# Patient Record
Sex: MALE | Race: WHITE | Employment: OTHER | URBAN - METROPOLITAN AREA
[De-identification: names, ages, dates, MRNs, and addresses within clinical notes are randomized per-mention and may not be internally consistent; named-entity substitution may affect disease eponyms.]

---

## 2016-12-27 LAB
ALBUMIN SERPL-MCNC: 4.9 G/DL
ALP BLD-CCNC: 105 U/L
ALT SERPL-CCNC: 27 U/L
AST SERPL-CCNC: 23 U/L
BASOPHILS ABSOLUTE: NORMAL /ΜL
BASOPHILS RELATIVE PERCENT: NORMAL %
BILIRUB SERPL-MCNC: 0.5 MG/DL (ref 0.1–1.4)
BUN BLDV-MCNC: 12 MG/DL
CALCIUM SERPL-MCNC: 9.4 MG/DL
CHLORIDE BLD-SCNC: 101 MMOL/L
CHOLESTEROL, TOTAL: 97 MG/DL
CHOLESTEROL/HDL RATIO: ABNORMAL
CO2: 24 MMOL/L
CREAT SERPL-MCNC: 0.54 MG/DL
EOSINOPHILS ABSOLUTE: NORMAL /ΜL
EOSINOPHILS RELATIVE PERCENT: NORMAL %
GFR CALCULATED: >60
GLUCOSE BLD-MCNC: 97 MG/DL
HCT VFR BLD CALC: 42 % (ref 41–53)
HDLC SERPL-MCNC: 74 MG/DL (ref 35–70)
HEMOGLOBIN: 14.7 G/DL (ref 13.5–17.5)
LDL CHOLESTEROL CALCULATED: 16 MG/DL (ref 0–160)
LYMPHOCYTES ABSOLUTE: NORMAL /ΜL
LYMPHOCYTES RELATIVE PERCENT: NORMAL %
MCH RBC QN AUTO: 31.1 PG
MCHC RBC AUTO-ENTMCNC: 34.9 G/DL
MCV RBC AUTO: 89.3 FL
MONOCYTES ABSOLUTE: NORMAL /ΜL
MONOCYTES RELATIVE PERCENT: NORMAL %
NEUTROPHILS ABSOLUTE: NORMAL /ΜL
NEUTROPHILS RELATIVE PERCENT: NORMAL %
PLATELET # BLD: 174 K/ΜL
PMV BLD AUTO: NORMAL FL
POTASSIUM SERPL-SCNC: 4 MMOL/L
RBC # BLD: 4.7 10^6/ΜL
SODIUM BLD-SCNC: 138 MMOL/L
TOTAL PROTEIN: 6.7
TRIGL SERPL-MCNC: 35 MG/DL
TSH SERPL DL<=0.05 MIU/L-ACNC: 5.62 UIU/ML
VITAMIN D 25-HYDROXY: 46.6
VITAMIN D2, 25 HYDROXY: NORMAL
VITAMIN D3,25 HYDROXY: NORMAL
VLDLC SERPL CALC-MCNC: ABNORMAL MG/DL
WBC # BLD: 5.6 10^3/ML

## 2017-06-26 ENCOUNTER — OFFICE VISIT (OUTPATIENT)
Dept: FAMILY MEDICINE CLINIC | Age: 22
End: 2017-06-26

## 2017-06-26 VITALS
SYSTOLIC BLOOD PRESSURE: 102 MMHG | HEIGHT: 68 IN | DIASTOLIC BLOOD PRESSURE: 60 MMHG | HEART RATE: 67 BPM | TEMPERATURE: 97.6 F | BODY MASS INDEX: 22.43 KG/M2 | WEIGHT: 148 LBS | OXYGEN SATURATION: 98 % | RESPIRATION RATE: 12 BRPM

## 2017-06-26 DIAGNOSIS — Z11.59 NEED FOR HEPATITIS B SCREENING TEST: ICD-10-CM

## 2017-06-26 DIAGNOSIS — Z11.4 SCREENING FOR HIV WITHOUT PRESENCE OF RISK FACTORS: ICD-10-CM

## 2017-06-26 DIAGNOSIS — Q04.0 AGENESIS OF CORPUS CALLOSUM (HCC): ICD-10-CM

## 2017-06-26 DIAGNOSIS — E55.9 VITAMIN D DEFICIENCY: Primary | ICD-10-CM

## 2017-06-26 DIAGNOSIS — F41.9 ANXIETY: ICD-10-CM

## 2017-06-26 DIAGNOSIS — G40.909 NONINTRACTABLE EPILEPSY WITHOUT STATUS EPILEPTICUS, UNSPECIFIED EPILEPSY TYPE (HCC): ICD-10-CM

## 2017-06-26 LAB
HBV SURFACE AB TITR SER: NORMAL MIU/ML
HEPATITIS B SURFACE ANTIGEN INTERPRETATION: NORMAL

## 2017-06-26 PROCEDURE — 99214 OFFICE O/P EST MOD 30 MIN: CPT | Performed by: FAMILY MEDICINE

## 2017-06-26 ASSESSMENT — ENCOUNTER SYMPTOMS
ALLERGIC/IMMUNOLOGIC NEGATIVE: 1
RESPIRATORY NEGATIVE: 1
GASTROINTESTINAL NEGATIVE: 1

## 2017-06-27 ENCOUNTER — TELEPHONE (OUTPATIENT)
Dept: FAMILY MEDICINE CLINIC | Age: 22
End: 2017-06-27

## 2017-06-27 LAB — HIV-1 AND HIV-2 ANTIBODIES: NEGATIVE

## 2017-06-30 ENCOUNTER — PATIENT MESSAGE (OUTPATIENT)
Dept: FAMILY MEDICINE CLINIC | Age: 22
End: 2017-06-30

## 2017-08-11 ENCOUNTER — OFFICE VISIT (OUTPATIENT)
Dept: INTERNAL MEDICINE | Age: 22
End: 2017-08-11

## 2017-08-11 VITALS
OXYGEN SATURATION: 98 % | WEIGHT: 143 LBS | DIASTOLIC BLOOD PRESSURE: 60 MMHG | SYSTOLIC BLOOD PRESSURE: 94 MMHG | HEIGHT: 68 IN | HEART RATE: 78 BPM | BODY MASS INDEX: 21.67 KG/M2 | TEMPERATURE: 97.3 F

## 2017-08-11 DIAGNOSIS — H10.33 ACUTE CONJUNCTIVITIS OF BOTH EYES, UNSPECIFIED ACUTE CONJUNCTIVITIS TYPE: Primary | ICD-10-CM

## 2017-08-11 PROCEDURE — 99213 OFFICE O/P EST LOW 20 MIN: CPT | Performed by: INTERNAL MEDICINE

## 2017-08-11 RX ORDER — TOBRAMYCIN 3 MG/ML
SOLUTION/ DROPS OPHTHALMIC
Qty: 5 ML | Refills: 1 | Status: SHIPPED | OUTPATIENT
Start: 2017-08-11 | End: 2018-01-02 | Stop reason: ALTCHOICE

## 2017-08-11 ASSESSMENT — ENCOUNTER SYMPTOMS
VOICE CHANGE: 0
CHOKING: 0
SHORTNESS OF BREATH: 0
WHEEZING: 0
PHOTOPHOBIA: 0
COUGH: 1
EYE PAIN: 0
GASTROINTESTINAL NEGATIVE: 1
EYE ITCHING: 1
EYE REDNESS: 1
SORE THROAT: 0
EYE DISCHARGE: 1
NAUSEA: 0

## 2017-08-22 ENCOUNTER — PATIENT MESSAGE (OUTPATIENT)
Dept: FAMILY MEDICINE CLINIC | Age: 22
End: 2017-08-22

## 2017-08-22 RX ORDER — CETIRIZINE HYDROCHLORIDE 10 MG/1
10 TABLET ORAL DAILY
Qty: 30 TABLET | Refills: 5 | Status: SHIPPED | OUTPATIENT
Start: 2017-08-22 | End: 2017-08-24 | Stop reason: SDUPTHER

## 2017-08-24 RX ORDER — CETIRIZINE HYDROCHLORIDE 10 MG/1
10 TABLET ORAL DAILY
Qty: 30 TABLET | Refills: 5 | Status: SHIPPED | OUTPATIENT
Start: 2017-08-24

## 2017-10-05 ENCOUNTER — PATIENT MESSAGE (OUTPATIENT)
Dept: FAMILY MEDICINE CLINIC | Age: 22
End: 2017-10-05

## 2017-10-09 ENCOUNTER — PATIENT MESSAGE (OUTPATIENT)
Dept: FAMILY MEDICINE CLINIC | Age: 22
End: 2017-10-09

## 2017-10-09 NOTE — TELEPHONE ENCOUNTER
From: John Kohler  To: Pacheco Frost DO  Sent: 10/9/2017 10:30 AM EDT  Subject: Non-Urgent Medical Question    Update on Flu Vaccine  Kenya Choudhury received the flu vaccine at [facility] on 9/19/17 with no negative side effects.

## 2017-11-17 ENCOUNTER — PATIENT MESSAGE (OUTPATIENT)
Dept: FAMILY MEDICINE CLINIC | Age: 22
End: 2017-11-17

## 2017-11-17 NOTE — TELEPHONE ENCOUNTER
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 No fever. Can he have something for the head cold symptoms?

## 2017-11-18 RX ORDER — AZITHROMYCIN 250 MG/1
TABLET, FILM COATED ORAL
Qty: 1 PACKET | Refills: 0 | Status: SHIPPED | OUTPATIENT
Start: 2017-11-18 | End: 2017-11-28

## 2018-01-02 ENCOUNTER — OFFICE VISIT (OUTPATIENT)
Dept: FAMILY MEDICINE CLINIC | Age: 23
End: 2018-01-02

## 2018-01-02 VITALS
WEIGHT: 145 LBS | HEART RATE: 98 BPM | TEMPERATURE: 97.6 F | SYSTOLIC BLOOD PRESSURE: 110 MMHG | RESPIRATION RATE: 12 BRPM | DIASTOLIC BLOOD PRESSURE: 62 MMHG | OXYGEN SATURATION: 99 % | BODY MASS INDEX: 21.98 KG/M2 | HEIGHT: 68 IN

## 2018-01-02 DIAGNOSIS — G40.909 NONINTRACTABLE EPILEPSY WITHOUT STATUS EPILEPTICUS, UNSPECIFIED EPILEPSY TYPE (HCC): Primary | ICD-10-CM

## 2018-01-02 PROCEDURE — 99213 OFFICE O/P EST LOW 20 MIN: CPT | Performed by: FAMILY MEDICINE

## 2018-01-02 RX ORDER — DEXTRAN 70, GLYCERIN, HYPROMELLOSE 1; 2; 3 MG/ML; MG/ML; MG/ML
SOLUTION/ DROPS OPHTHALMIC
COMMUNITY
End: 2018-07-02 | Stop reason: SDUPTHER

## 2018-01-02 ASSESSMENT — ENCOUNTER SYMPTOMS
GASTROINTESTINAL NEGATIVE: 1
ALLERGIC/IMMUNOLOGIC NEGATIVE: 1
RESPIRATORY NEGATIVE: 1

## 2018-01-02 ASSESSMENT — PATIENT HEALTH QUESTIONNAIRE - PHQ9
1. LITTLE INTEREST OR PLEASURE IN DOING THINGS: 0
SUM OF ALL RESPONSES TO PHQ QUESTIONS 1-9: 0
2. FEELING DOWN, DEPRESSED OR HOPELESS: 0
SUM OF ALL RESPONSES TO PHQ9 QUESTIONS 1 & 2: 0

## 2018-01-02 NOTE — PATIENT INSTRUCTIONS
Thank you for enrolling in [address] Please follow the instructions below to securely access your online medical record. [employer] allows you to send messages to your doctor, view your test results, renew your prescriptions, schedule appointments, and more. How Do I Sign Up? 1. In your Internet browser, go to [email or web address]  2. Click on the Sign Up Now link in the Sign In box. You will see the New Member Sign Up page. 3. Enter your [employer] Access Code exactly as it appears below. You will not need to use this code after youve completed the sign-up process. If you do not sign up before the expiration date, you must request a new code. [employer] Access Code: [email or web address]  Expires: 3/3/2018  9:07 AM    4. Enter your Social Security Number ([id number]) and Date of Birth (mm/dd/yyyy) as indicated and click Submit. You will be taken to the next sign-up page. 5. Create a [employer] ID. This will be your [employer] login ID and cannot be changed, so think of one that is secure and easy to remember. 6. Create a [employer] password. You can change your password at any time. 7. Enter your Password Reset Question and Answer. This can be used at a later time if you forget your password. 8. Enter your e-mail address. You will receive e-mail notification when new information is available in [address] 9. Click Sign Up. You can now view your medical record. Additional Information  If you have questions, please contact your physician practice where you receive care. Remember, [employer] is NOT to be used for urgent needs. For medical emergencies, dial 911.

## 2018-01-04 DIAGNOSIS — F41.9 ANXIETY: ICD-10-CM

## 2018-01-04 RX ORDER — CLONAZEPAM 1 MG/1
1 TABLET ORAL 2 TIMES DAILY
Qty: 60 TABLET | Refills: 2 | Status: SHIPPED | OUTPATIENT
Start: 2018-01-04 | End: 2018-12-27 | Stop reason: SDUPTHER

## 2018-01-19 LAB
ALBUMIN SERPL-MCNC: 4.4 G/DL (ref 3.9–4.9)
ALP BLD-CCNC: 96 U/L (ref 35–104)
ALT SERPL-CCNC: 27 U/L (ref 0–41)
ANION GAP SERPL CALCULATED.3IONS-SCNC: 11 MEQ/L (ref 7–13)
ANISOCYTOSIS: ABNORMAL
AST SERPL-CCNC: 20 U/L (ref 0–40)
ATYPICAL LYMPHOCYTE RELATIVE PERCENT: 18 %
BASOPHILS ABSOLUTE: 0.1 K/UL (ref 0–0.2)
BASOPHILS RELATIVE PERCENT: 2 %
BILIRUB SERPL-MCNC: 0.5 MG/DL (ref 0–1.2)
BUN BLDV-MCNC: 13 MG/DL (ref 6–20)
CALCIUM SERPL-MCNC: 9.6 MG/DL (ref 8.6–10.2)
CHLORIDE BLD-SCNC: 102 MEQ/L (ref 98–107)
CHOLESTEROL, TOTAL: 81 MG/DL (ref 0–199)
CO2: 28 MEQ/L (ref 22–29)
CREAT SERPL-MCNC: 0.55 MG/DL (ref 0.7–1.2)
EOSINOPHILS ABSOLUTE: 0.1 K/UL (ref 0–0.7)
EOSINOPHILS RELATIVE PERCENT: 4 %
GFR AFRICAN AMERICAN: >60
GFR NON-AFRICAN AMERICAN: >60
GLOBULIN: 1.9 G/DL (ref 2.3–3.5)
GLUCOSE BLD-MCNC: 93 MG/DL (ref 74–109)
HBV SURFACE AB TITR SER: REACTIVE MIU/ML
HCT VFR BLD CALC: 39.9 % (ref 42–52)
HDLC SERPL-MCNC: 63 MG/DL (ref 40–59)
HEMATOLOGY PATH CONSULT: YES
HEMOGLOBIN: 13.9 G/DL (ref 14–18)
HEPATITIS B SURFACE ANTIGEN INTERPRETATION: NORMAL
HYPOCHROMIA: 0
LDL CHOLESTEROL CALCULATED: 14 MG/DL (ref 0–129)
LYMPHOCYTES ABSOLUTE: 1.4 K/UL (ref 1–4.8)
LYMPHOCYTES RELATIVE PERCENT: 23 %
MACROCYTES: 0
MCH RBC QN AUTO: 31.3 PG (ref 27–31.3)
MCHC RBC AUTO-ENTMCNC: 34.8 % (ref 33–37)
MCV RBC AUTO: 89.9 FL (ref 80–100)
MICROCYTES: 0
MONOCYTES ABSOLUTE: 0.2 K/UL (ref 0.2–0.8)
MONOCYTES RELATIVE PERCENT: 6.4 %
NEUTROPHILS ABSOLUTE: 1.6 K/UL (ref 1.4–6.5)
NEUTROPHILS RELATIVE PERCENT: 47 %
PDW BLD-RTO: 13.1 % (ref 11.5–14.5)
PLATELET # BLD: 154 K/UL (ref 130–400)
PLATELET SLIDE REVIEW: ADEQUATE
POIKILOCYTES: 0
POLYCHROMASIA: 0
POTASSIUM SERPL-SCNC: 4 MEQ/L (ref 3.5–5.1)
RBC # BLD: 4.44 M/UL (ref 4.7–6.1)
SLIDE REVIEW: ABNORMAL
SODIUM BLD-SCNC: 141 MEQ/L (ref 132–144)
TOTAL PROTEIN: 6.3 G/DL (ref 6.4–8.1)
TRIGL SERPL-MCNC: 22 MG/DL (ref 0–200)
TSH REFLEX: 2.77 UIU/ML (ref 0.27–4.2)
VITAMIN D 25-HYDROXY: 53.6 NG/ML (ref 30–100)
WBC # BLD: 3.4 K/UL (ref 4.8–10.8)

## 2018-01-21 LAB
HIV-1 AND HIV-2 ANTIBODIES: NEGATIVE
LAMOTRIGINE LEVEL: 5 UG/ML (ref 2.5–15)

## 2018-01-22 LAB — HEMATOLOGY PATH CONSULT: NORMAL

## 2018-01-23 LAB
CLOZAPINE QUANT: <100 NG/ML
CLOZAPINE-N-OXIDE: <100 NG/ML
NORCLOZAPINE QUANT: <100 NG/ML
TOTAL CLOZAPINE: NORMAL NG/ML

## 2018-01-30 LAB — CLONAZEPAM LEVEL: 19 NG/ML (ref 20–70)

## 2018-02-16 LAB
ALBUMIN SERPL-MCNC: 4.6 G/DL (ref 3.9–4.9)
ALP BLD-CCNC: 103 U/L (ref 35–104)
ALT SERPL-CCNC: 30 U/L (ref 0–41)
AST SERPL-CCNC: 22 U/L (ref 0–40)
BASOPHILS ABSOLUTE: 0 K/UL (ref 0–0.2)
BASOPHILS RELATIVE PERCENT: 0.8 %
BILIRUB SERPL-MCNC: 0.5 MG/DL (ref 0–1.2)
BILIRUBIN DIRECT: 0.2 MG/DL (ref 0–0.3)
BILIRUBIN, INDIRECT: 0.3 MG/DL (ref 0–0.6)
EOSINOPHILS ABSOLUTE: 0.1 K/UL (ref 0–0.7)
EOSINOPHILS RELATIVE PERCENT: 1.7 %
HCT VFR BLD CALC: 41 % (ref 42–52)
HEMOGLOBIN: 13.8 G/DL (ref 14–18)
LYMPHOCYTES ABSOLUTE: 1.4 K/UL (ref 1–4.8)
LYMPHOCYTES RELATIVE PERCENT: 43.3 %
MCH RBC QN AUTO: 30.5 PG (ref 27–31.3)
MCHC RBC AUTO-ENTMCNC: 33.7 % (ref 33–37)
MCV RBC AUTO: 90.5 FL (ref 80–100)
MONOCYTES ABSOLUTE: 0.3 K/UL (ref 0.2–0.8)
MONOCYTES RELATIVE PERCENT: 10.2 %
NEUTROPHILS ABSOLUTE: 1.5 K/UL (ref 1.4–6.5)
NEUTROPHILS RELATIVE PERCENT: 44 %
PDW BLD-RTO: 13.5 % (ref 11.5–14.5)
PLATELET # BLD: 161 K/UL (ref 130–400)
RBC # BLD: 4.53 M/UL (ref 4.7–6.1)
SLIDE REVIEW: ABNORMAL
TOTAL PROTEIN: 6.4 G/DL (ref 6.4–8.1)
WBC # BLD: 3.3 K/UL (ref 4.8–10.8)

## 2018-02-22 LAB
ALBUMIN SERPL-MCNC: 4.5 G/DL (ref 3.9–4.9)
ALP BLD-CCNC: 101 U/L (ref 35–104)
ALT SERPL-CCNC: 26 U/L (ref 0–41)
ANISOCYTOSIS: ABNORMAL
AST SERPL-CCNC: 22 U/L (ref 0–40)
ATYPICAL LYMPHOCYTE RELATIVE PERCENT: 8 %
BANDED NEUTROPHILS RELATIVE PERCENT: 5 %
BASOPHILS ABSOLUTE: 0.1 K/UL (ref 0–0.2)
BASOPHILS RELATIVE PERCENT: 3 %
BILIRUB SERPL-MCNC: 0.5 MG/DL (ref 0–1.2)
BILIRUBIN DIRECT: 0.2 MG/DL (ref 0–0.3)
BILIRUBIN, INDIRECT: 0.3 MG/DL (ref 0–0.6)
EOSINOPHILS ABSOLUTE: 0 K/UL (ref 0–0.7)
EOSINOPHILS RELATIVE PERCENT: 1 %
HCT VFR BLD CALC: 40.8 % (ref 42–52)
HEMOGLOBIN: 14.1 G/DL (ref 14–18)
HYPOCHROMIA: 0
LYMPHOCYTES ABSOLUTE: 1.5 K/UL (ref 1–4.8)
LYMPHOCYTES RELATIVE PERCENT: 40 %
MACROCYTES: 0
MCH RBC QN AUTO: 30.8 PG (ref 27–31.3)
MCHC RBC AUTO-ENTMCNC: 34.6 % (ref 33–37)
MCV RBC AUTO: 89.1 FL (ref 80–100)
MICROCYTES: ABNORMAL
MONOCYTES ABSOLUTE: 0.2 K/UL (ref 0.2–0.8)
MONOCYTES RELATIVE PERCENT: 5.7 %
NEUTROPHILS ABSOLUTE: 1.4 K/UL (ref 1.4–6.5)
NEUTROPHILS RELATIVE PERCENT: 39 %
PDW BLD-RTO: 13.9 % (ref 11.5–14.5)
PLATELET # BLD: 157 K/UL (ref 130–400)
PLATELET SLIDE REVIEW: NORMAL
POIKILOCYTES: 0
POLYCHROMASIA: 0
RBC # BLD: 4.59 M/UL (ref 4.7–6.1)
TOTAL PROTEIN: 6.1 G/DL (ref 6.4–8.1)
WBC # BLD: 3.1 K/UL (ref 4.8–10.8)

## 2018-04-04 ENCOUNTER — TELEPHONE (OUTPATIENT)
Dept: FAMILY MEDICINE CLINIC | Age: 23
End: 2018-04-04

## 2018-04-04 DIAGNOSIS — H53.9 VISION CHANGES: Primary | ICD-10-CM

## 2018-04-04 DIAGNOSIS — R47.9 SPEECH PROBLEM: ICD-10-CM

## 2018-04-19 ENCOUNTER — HOSPITAL ENCOUNTER (OUTPATIENT)
Dept: CT IMAGING | Age: 23
Discharge: HOME | End: 2018-04-21
Payer: MEDICAID

## 2018-04-19 VITALS
SYSTOLIC BLOOD PRESSURE: 110 MMHG | RESPIRATION RATE: 16 BRPM | WEIGHT: 134 LBS | HEIGHT: 67 IN | BODY MASS INDEX: 21.03 KG/M2 | HEART RATE: 97 BPM | DIASTOLIC BLOOD PRESSURE: 66 MMHG

## 2018-04-19 DIAGNOSIS — R47.9 SPEECH PROBLEM: ICD-10-CM

## 2018-04-19 DIAGNOSIS — H53.9 VISION CHANGES: ICD-10-CM

## 2018-04-19 PROCEDURE — 6360000004 HC RX CONTRAST MEDICATION: Performed by: FAMILY MEDICINE

## 2018-04-19 PROCEDURE — 70470 CT HEAD/BRAIN W/O & W/DYE: CPT

## 2018-04-19 RX ORDER — SODIUM CHLORIDE 0.9 % (FLUSH) 0.9 %
10 SYRINGE (ML) INJECTION
Status: DISPENSED | OUTPATIENT
Start: 2018-04-19 | End: 2018-04-19

## 2018-04-19 RX ADMIN — IOPAMIDOL 50 ML: 755 INJECTION, SOLUTION INTRAVENOUS at 09:32

## 2018-06-01 LAB
ANION GAP SERPL CALCULATED.3IONS-SCNC: 11 MEQ/L (ref 7–13)
BUN BLDV-MCNC: 10 MG/DL (ref 6–20)
CALCIUM SERPL-MCNC: 9 MG/DL (ref 8.6–10.2)
CHLORIDE BLD-SCNC: 101 MEQ/L (ref 98–107)
CHOLESTEROL, TOTAL: 72 MG/DL (ref 0–199)
CO2: 28 MEQ/L (ref 22–29)
CREAT SERPL-MCNC: 0.49 MG/DL (ref 0.7–1.2)
GFR AFRICAN AMERICAN: >60
GFR NON-AFRICAN AMERICAN: >60
GLUCOSE BLD-MCNC: 96 MG/DL (ref 74–109)
HCT VFR BLD CALC: 37.8 % (ref 42–52)
HDLC SERPL-MCNC: 49 MG/DL (ref 40–59)
HEMOGLOBIN: 13.2 G/DL (ref 14–18)
LDL CHOLESTEROL CALCULATED: 19 MG/DL (ref 0–129)
MCH RBC QN AUTO: 31.1 PG (ref 27–31.3)
MCHC RBC AUTO-ENTMCNC: 35.1 % (ref 33–37)
MCV RBC AUTO: 88.7 FL (ref 80–100)
PDW BLD-RTO: 13.5 % (ref 11.5–14.5)
PLATELET # BLD: 226 K/UL (ref 130–400)
POTASSIUM SERPL-SCNC: 4.1 MEQ/L (ref 3.5–5.1)
RBC # BLD: 4.26 M/UL (ref 4.7–6.1)
SODIUM BLD-SCNC: 140 MEQ/L (ref 132–144)
TRIGL SERPL-MCNC: 18 MG/DL (ref 0–200)
TSH REFLEX: 2.06 UIU/ML (ref 0.27–4.2)
VITAMIN D 25-HYDROXY: 56.2 NG/ML (ref 30–100)
WBC # BLD: 3.7 K/UL (ref 4.8–10.8)

## 2018-07-02 RX ORDER — DEXTRAN 70, GLYCERIN, HYPROMELLOSE 1; 2; 3 MG/ML; MG/ML; MG/ML
1 SOLUTION/ DROPS OPHTHALMIC 2 TIMES DAILY
Qty: 1 BOTTLE | Refills: 3 | Status: SHIPPED | OUTPATIENT
Start: 2018-07-02

## 2018-07-12 ENCOUNTER — OFFICE VISIT (OUTPATIENT)
Dept: FAMILY MEDICINE CLINIC | Age: 23
End: 2018-07-12
Payer: MEDICAID

## 2018-07-12 VITALS
TEMPERATURE: 98.6 F | HEART RATE: 77 BPM | RESPIRATION RATE: 12 BRPM | BODY MASS INDEX: 22.13 KG/M2 | SYSTOLIC BLOOD PRESSURE: 102 MMHG | HEIGHT: 67 IN | OXYGEN SATURATION: 98 % | WEIGHT: 141 LBS | DIASTOLIC BLOOD PRESSURE: 70 MMHG

## 2018-07-12 DIAGNOSIS — Z13.220 LIPID SCREENING: ICD-10-CM

## 2018-07-12 DIAGNOSIS — G40.909 NONINTRACTABLE EPILEPSY WITHOUT STATUS EPILEPTICUS, UNSPECIFIED EPILEPSY TYPE (HCC): Primary | ICD-10-CM

## 2018-07-12 DIAGNOSIS — F41.9 ANXIETY: ICD-10-CM

## 2018-07-12 DIAGNOSIS — Q04.0 AGENESIS OF CORPUS CALLOSUM (HCC): ICD-10-CM

## 2018-07-12 DIAGNOSIS — E55.9 VITAMIN D DEFICIENCY: ICD-10-CM

## 2018-07-12 DIAGNOSIS — R26.9 ABNORMALITY OF GAIT AND MOBILITY: ICD-10-CM

## 2018-07-12 PROBLEM — H54.8 LEGAL BLINDNESS, AS DEFINED IN USA: Status: ACTIVE | Noted: 2018-06-19

## 2018-07-12 PROBLEM — H55.01 CONGENITAL NYSTAGMUS: Status: ACTIVE | Noted: 2018-06-19

## 2018-07-12 PROBLEM — H52.203 MYOPIA OF BOTH EYES WITH ASTIGMATISM: Status: ACTIVE | Noted: 2018-06-19

## 2018-07-12 PROBLEM — H47.033 OPTIC NERVE HYPOPLASIA, BILATERAL: Status: ACTIVE | Noted: 2018-06-19

## 2018-07-12 PROBLEM — H52.13 MYOPIA OF BOTH EYES WITH ASTIGMATISM: Status: ACTIVE | Noted: 2018-06-19

## 2018-07-12 PROCEDURE — 99214 OFFICE O/P EST MOD 30 MIN: CPT | Performed by: FAMILY MEDICINE

## 2018-07-12 RX ORDER — RISPERIDONE 2 MG/1
2 TABLET, ORALLY DISINTEGRATING ORAL 2 TIMES DAILY
COMMUNITY
End: 2019-08-07

## 2018-07-12 ASSESSMENT — ENCOUNTER SYMPTOMS
VOMITING: 0
RESPIRATORY NEGATIVE: 1
GASTROINTESTINAL NEGATIVE: 1
VISUAL CHANGE: 0
NAUSEA: 0
ABDOMINAL PAIN: 0
BOWEL INCONTINENCE: 0
EYES NEGATIVE: 1
ALLERGIC/IMMUNOLOGIC NEGATIVE: 1

## 2018-07-12 NOTE — PROGRESS NOTES
Subjective  Josie Avalos, 21 y.o. male presents today with:  Chief Complaint   Patient presents with    6 Month Follow-Up    Results     labs     Fall     pt. has been having increase falls x 2 months        Patient comes to the office today for routine recheck. Long-standing history of mental retardation, agenesis of the corpus callosum, seizure disorder. He does have issues with gait problems Long-term due to all of the above. Over the past 2 months, however, he has been having some increased issues with falling. Caregiver with [facility] today states that at the time they evaluated him and he did have shoes that were too big for him and also he had his psychiatric medications adjusted recently because he was hitting  himself more frequently. The increase of the medications did decrease the self-injurious behavior . They do, however, correlate with increased gait instability and falls. Fall   The accident occurred more than 1 week ago. The fall occurred while walking. Distance fallen: standing  He landed on hard floor. The point of impact was the head, right knee and left knee. The pain is mild. Pertinent negatives include no abdominal pain, bowel incontinence, fever, headaches, hearing loss, hematuria, loss of consciousness, nausea, numbness, tingling, visual change or vomiting. He has tried ice and rest for the symptoms. The treatment provided mild relief. Review of Systems   Constitutional: Negative for fever. HENT: Negative. Eyes: Negative. Respiratory: Negative. Cardiovascular: Negative. Gastrointestinal: Negative. Negative for abdominal pain, bowel incontinence, nausea and vomiting. Endocrine: Negative. Genitourinary: Negative. Negative for hematuria. Musculoskeletal: Positive for gait problem. Skin: Positive for wound. Allergic/Immunologic: Negative. Neurological: Positive for weakness (unchanged).  Negative for tingling, loss of consciousness, numbness and headaches. Hematological: Negative. Psychiatric/Behavioral: Negative. Past Medical History:   Diagnosis Date    ADHD (attention deficit hyperactivity disorder)     Anxiety     Epilepsy ([facility] [address]     Myopia     Nystagmus     Optic nerve hypoplasia      No past surgical history on file. Social History     Social History    Marital status: Single     Spouse name: N/A    Number of children: N/A    Years of education: N/A     Occupational History    Not on file. Social History Main Topics    Smoking status: Never Smoker    Smokeless tobacco: Never Used    Alcohol use No    Drug use: No    Sexual activity: Not on file     Other Topics Concern    Not on file     Social History Narrative    No narrative on file     No family history on file. No Known Allergies  Current Outpatient Prescriptions on File Prior to Visit   Medication Sig Dispense Refill    Artificial Tear Solution (GENTEAL TEARS) 0.1-0.2-0.3 % SOLN Apply 1 drop to eye 2 times daily 1 Bottle 3    clonazePAM (KLONOPIN) 1 MG tablet Take 1 tablet by mouth 2 times daily for 90 days. 60 tablet 2    cetirizine (ZYRTEC ALLERGY) 10 MG tablet Take 1 tablet by mouth daily 30 tablet 5    Cholecalciferol (VITAMIN D) 2000 UNITS CAPS capsule Take by mouth daily      Multiple Vitamin (MULTI VITAMIN DAILY PO) Take by mouth daily      lamoTRIgine (LAMICTAL) 100 MG tablet Take 150 mg by mouth 2 times daily      FLUoxetine (PROZAC) 10 MG tablet Take 5 mg by mouth daily       No current facility-administered medications on file prior to visit. Objective    Vitals:    07/12/18 1118   BP: 102/70   Pulse: 77   Resp: 12   Temp: 98.6 °F (37 °C)   SpO2: 98%   Weight: 141 lb (64 kg)   Height: 5' 7\" (1.702 m)     Physical Exam   Constitutional: Vital signs are normal. He appears well-developed and well-nourished. No distress. HENT:   Head: Normocephalic and atraumatic.    Right Ear: Tympanic membrane, external ear and ear canal normal. Tympanic membrane is not injected. No middle ear effusion. Left Ear: Tympanic membrane, external ear and ear canal normal. Tympanic membrane is not injected. No middle ear effusion. Nose: Nose normal. No mucosal edema or rhinorrhea. Right sinus exhibits no maxillary sinus tenderness and no frontal sinus tenderness. Left sinus exhibits no maxillary sinus tenderness and no frontal sinus tenderness. Mouth/Throat:       Eyes: Conjunctivae, EOM and lids are normal. Pupils are equal, round, and reactive to light. Neck: Normal range of motion. Neck supple. No JVD present. No muscular tenderness present. No neck rigidity. No tracheal deviation present. No thyroid mass and no thyromegaly present. Cardiovascular: Normal rate, regular rhythm and intact distal pulses. Pulmonary/Chest: Effort normal. He has no decreased breath sounds. He has no wheezes. He has no rhonchi. He has no rales. He exhibits no tenderness and no deformity. Abdominal: Soft. Normal appearance and bowel sounds are normal. He exhibits no distension and no mass. There is no splenomegaly or hepatomegaly. There is no tenderness. There is no rigidity, no rebound and no guarding. No hernia. Musculoskeletal: Normal range of motion. Right knee: Normal.        Left knee: Normal.        Cervical back: Normal.        Thoracic back: Normal.        Lumbar back: Normal.        Lymphadenopathy:     He has no cervical adenopathy. Neurological: He is alert. He displays no atrophy and no tremor. No cranial nerve deficit or sensory deficit. He exhibits normal muscle tone. He displays no seizure activity. Coordination and gait abnormal.   Mild expressive aphasia   Skin: Skin is warm, dry and intact. No rash noted. He is not diaphoretic. Psychiatric: He has a normal mood and affect. His behavior is normal. Thought content normal. His speech is slurred. Assessment & Plan    Diagnosis Orders   1.  Nonintractable epilepsy without status epilepticus, unspecified epilepsy type ([facility]  CBC Auto Differential    Comprehensive Metabolic Panel    Magnesium    TSH without Reflex   2. Vitamin D deficiency  Vitamin D 25 Hydrox, D2 & D3   3. Anxiety     4. Lipid screening  Lipid Panel   5. Agenesis of corpus callosum ([facility]     6. Abnormality of gait and mobility       Orders Placed This Encounter   Procedures    CBC Auto Differential     Standing Status:   Future     Standing Expiration Date:   7/12/2019    Comprehensive Metabolic Panel     Standing Status:   Future     Standing Expiration Date:   7/12/2019    Lipid Panel     Standing Status:   Future     Standing Expiration Date:   7/12/2019     Order Specific Question:   Is Patient Fasting?/# of Hours     Answer:   8    Magnesium     Standing Status:   Future     Standing Expiration Date:   7/12/2019    TSH without Reflex     Standing Status:   Future     Standing Expiration Date:   7/12/2019    Vitamin D 25 Hydrox, D2 & D3     Standing Status:   Future     Standing Expiration Date:   7/12/2019     No orders of the defined types were placed in this encounter. Medications Discontinued During This Encounter   Medication Reason    risperiDONE (RISPERDAL) 1 MG tablet DOSE ADJUSTMENT   recommend maintain meds for now. In future ie next visit with psych discuss option for lowest dosage possible of risperdal to decrease fall risk of course, while maintaining benefit for decrease in self-injurious behavior. I.e. Risk versus benefit  Would recommend maintain current clonopin and lamictal dosages d/t sz disorder. As although these both can increase sedation and hence increased risk of falling risk. They have greater benefit in reducing seizures. Fall risk reduction - ambulate with assist, proper fitting footware etc  Counseling given: Yes      Return in about 1 year (around 7/12/2019).     Geofm Rater, DO

## 2018-07-12 NOTE — PATIENT INSTRUCTIONS
Patient Education        Preventing Falls: Care Instructions  Your Care Instructions    Getting around your home safely can be a challenge if you have injuries or health problems that make it easy for you to fall. Loose rugs and furniture in walkways are among the dangers for many older people who have problems walking or who have poor eyesight. People who have conditions such as arthritis, osteoporosis, or dementia also have to be careful not to fall. You can make your home safer with a few simple measures. Follow-up care is a key part of your treatment and safety. Be sure to make and go to all appointments, and call your doctor if you are having problems. It's also a good idea to know your test results and keep a list of the medicines you take. How can you care for yourself at home? Taking care of yourself  · You may get dizzy if you do not drink enough water. To prevent dehydration, drink plenty of fluids, enough so that your urine is light yellow or clear like water. Choose water and other caffeine-free clear liquids. If you have kidney, heart, or liver disease and have to limit fluids, talk with your doctor before you increase the amount of fluids you drink. · Exercise regularly to improve your strength, muscle tone, and balance. Walk if you can. Swimming may be a good choice if you cannot walk easily. · Have your vision and hearing checked each year or any time you notice a change. If you have trouble seeing and hearing, you might not be able to avoid objects and could lose your balance. · Know the side effects of the medicines you take. Ask your doctor or pharmacist whether the medicines you take can affect your balance. Sleeping pills or sedatives can affect your balance. · Limit the amount of alcohol you drink. Alcohol can impair your balance and other senses. · Ask your doctor whether calluses or corns on your feet need to be removed.  If you wear loose-fitting shoes because of calluses or corns, you can lose your balance and fall. · Talk to your doctor if you have numbness in your feet. Preventing falls at home  · Remove raised doorway thresholds, throw rugs, and clutter. Repair loose carpet or raised areas in the floor. · Move furniture and electrical cords to keep them out of walking paths. · Use nonskid floor wax, and wipe up spills right away, especially on ceramic tile floors. · If you use a walker or cane, put rubber tips on it. If you use crutches, clean the bottoms of them regularly with an abrasive pad, such as steel wool. · Keep your house well lit, especially Nadira Small, and outside walkways. Use night-lights in areas such as hallways and bathrooms. Add extra light switches or use remote switches (such as switches that go on or off when you clap your hands) to make it easier to turn lights on if you have to get up during the night. · Install sturdy handrails on stairways. · Move items in your cabinets so that the things you use a lot are on the lower shelves (about waist level). · Keep a cordless phone and a flashlight with new batteries by your bed. If possible, put a phone in each of the main rooms of your house, or carry a cell phone in case you fall and cannot reach a phone. Or, you can wear a device around your neck or wrist. You push a button that sends a signal for help. · Wear low-heeled shoes that fit well and give your feet good support. Use footwear with nonskid soles. Check the heels and soles of your shoes for wear. Repair or replace worn heels or soles. · Do not wear socks without shoes on wood floors. · Walk on the grass when the sidewalks are slippery. If you live in an area that gets snow and ice in the winter, sprinkle salt on slippery steps and sidewalks. Preventing falls in the bath  · Install grab bars and nonskid mats inside and outside your shower or tub and near the toilet and sinks. · Use shower chairs and bath benches.   · Use a hand-held shower head that will allow you to sit while showering. · Get into a tub or shower by putting the weaker leg in first. Get out of a tub or shower with your strong side first.  · Repair loose toilet seats and consider installing a raised toilet seat to make getting on and off the toilet easier. · Keep your bathroom door unlocked while you are in the shower. Where can you learn more? Go to https://Merge [email or web address].[email or web address]. org and sign in to your [employer]World account. Enter [phone] in the AGL[employer]IC box to learn more about \"Preventing Falls: Care Instructions. \"     If you do not have an account, please click on the \"Sign Up Now\" link. Current as of: May 12, 2017  Content Version: 11.6  © 9979-9661 [employer]. Care instructions adapted under license by [facility] ([facility]). If you have questions about a medical condition or this instruction, always ask your healthcare professional. [address] any warranty or liability for your use of this information.

## 2018-07-18 ENCOUNTER — TELEPHONE (OUTPATIENT)
Dept: FAMILY MEDICINE CLINIC | Age: 23
End: 2018-07-18

## 2018-12-05 ENCOUNTER — OFFICE VISIT (OUTPATIENT)
Dept: FAMILY MEDICINE CLINIC | Age: 23
End: 2018-12-05
Payer: MEDICAID

## 2018-12-05 VITALS
TEMPERATURE: 99.5 F | WEIGHT: 146.7 LBS | OXYGEN SATURATION: 98 % | DIASTOLIC BLOOD PRESSURE: 62 MMHG | HEART RATE: 105 BPM | SYSTOLIC BLOOD PRESSURE: 104 MMHG | BODY MASS INDEX: 22.98 KG/M2

## 2018-12-05 DIAGNOSIS — H66.003 ACUTE SUPPURATIVE OTITIS MEDIA OF BOTH EARS WITHOUT SPONTANEOUS RUPTURE OF TYMPANIC MEMBRANES, RECURRENCE NOT SPECIFIED: Primary | ICD-10-CM

## 2018-12-05 DIAGNOSIS — R68.89 FLU-LIKE SYMPTOMS: ICD-10-CM

## 2018-12-05 LAB
INFLUENZA A ANTIBODY: NORMAL
INFLUENZA B ANTIBODY: NORMAL

## 2018-12-05 PROCEDURE — 99213 OFFICE O/P EST LOW 20 MIN: CPT | Performed by: NURSE PRACTITIONER

## 2018-12-05 PROCEDURE — 87804 INFLUENZA ASSAY W/OPTIC: CPT | Performed by: NURSE PRACTITIONER

## 2018-12-05 RX ORDER — AMOXICILLIN AND CLAVULANATE POTASSIUM 875; 125 MG/1; MG/1
1 TABLET, FILM COATED ORAL 2 TIMES DAILY
Qty: 20 TABLET | Refills: 0 | Status: SHIPPED | OUTPATIENT
Start: 2018-12-05 | End: 2018-12-15

## 2018-12-05 ASSESSMENT — ENCOUNTER SYMPTOMS
VOMITING: 0
STRIDOR: 0
EYE ITCHING: 0
TROUBLE SWALLOWING: 0
WHEEZING: 0
NAUSEA: 0
COUGH: 1
EYE PAIN: 0
SINUS PAIN: 0
SINUS PRESSURE: 1
RHINORRHEA: 1
EYE DISCHARGE: 0
EYE REDNESS: 0
CHEST TIGHTNESS: 0
PHOTOPHOBIA: 0
SHORTNESS OF BREATH: 0
DIARRHEA: 0
ABDOMINAL PAIN: 0
FACIAL SWELLING: 0
SORE THROAT: 1

## 2018-12-19 ENCOUNTER — OFFICE VISIT (OUTPATIENT)
Dept: FAMILY MEDICINE CLINIC | Age: 23
End: 2018-12-19
Payer: MEDICAID

## 2018-12-19 VITALS
WEIGHT: 143 LBS | DIASTOLIC BLOOD PRESSURE: 60 MMHG | HEIGHT: 67 IN | HEART RATE: 81 BPM | RESPIRATION RATE: 12 BRPM | TEMPERATURE: 98.8 F | BODY MASS INDEX: 22.44 KG/M2 | SYSTOLIC BLOOD PRESSURE: 102 MMHG | OXYGEN SATURATION: 98 %

## 2018-12-19 DIAGNOSIS — L30.9 ECZEMA OF BOTH HANDS: Primary | ICD-10-CM

## 2018-12-19 PROCEDURE — 99213 OFFICE O/P EST LOW 20 MIN: CPT | Performed by: FAMILY MEDICINE

## 2018-12-19 RX ORDER — DIAPER,BRIEF,INFANT-TODD,DISP
EACH MISCELLANEOUS
Qty: 30 G | Refills: 11 | Status: SHIPPED | OUTPATIENT
Start: 2018-12-19 | End: 2018-12-29

## 2018-12-19 ASSESSMENT — ENCOUNTER SYMPTOMS
VOMITING: 0
DIARRHEA: 0
SHORTNESS OF BREATH: 0
EYE PAIN: 0
RHINORRHEA: 0
SORE THROAT: 0
NAIL CHANGES: 0
COUGH: 0

## 2018-12-19 NOTE — PROGRESS NOTES
Subjective  Benji Klein, 21 y.o. male presents today with:  Chief Complaint   Patient presents with    Rash     on left fingers x 3 weeks        Comes into the office today complaining of bilateral hand rash. Patient constantly having hands in his mouth, getting wet. They have attempted occasional moisturizing lotion. However, this caused some burning when applied so he has been reluctant to use it routinely. Rash   This is a recurrent problem. The current episode started 1 to 4 weeks ago. The problem has been waxing and waning since onset. The affected locations include the left fingers and right fingers. The rash is characterized by scaling, redness, itchiness, dryness and burning. He was exposed to nothing. Pertinent negatives include no anorexia, congestion, cough, diarrhea, eye pain, facial edema, fatigue, fever, joint pain, nail changes, rhinorrhea, shortness of breath, sore throat or vomiting. Past treatments include moisturizer. The treatment provided mild relief. His past medical history is significant for eczema. There is no history of allergies, asthma or varicella. Review of Systems   Constitutional: Negative for fatigue and fever. HENT: Negative for congestion, rhinorrhea and sore throat. Eyes: Negative for pain. Respiratory: Negative for cough and shortness of breath. Gastrointestinal: Negative for anorexia, diarrhea and vomiting. Musculoskeletal: Negative for joint pain. Skin: Positive for rash. Negative for nail changes. Past Medical History:   Diagnosis Date    ADHD (attention deficit hyperactivity disorder)     Anxiety     Epilepsy ([facility] [address]     Myopia     Nystagmus     Optic nerve hypoplasia      No past surgical history on file. Social History     Social History    Marital status: Single     Spouse name: N/A    Number of children: N/A    Years of education: N/A     Occupational History    Not on file.      Social History Main Topics    Smoking

## 2018-12-27 DIAGNOSIS — F41.9 ANXIETY: ICD-10-CM

## 2018-12-27 RX ORDER — CLONAZEPAM 1 MG/1
1 TABLET ORAL 3 TIMES DAILY PRN
Qty: 90 TABLET | Refills: 2 | Status: SHIPPED | OUTPATIENT
Start: 2018-12-27 | End: 2020-02-24 | Stop reason: SDUPTHER

## 2019-01-11 ENCOUNTER — OFFICE VISIT (OUTPATIENT)
Dept: FAMILY MEDICINE CLINIC | Age: 24
End: 2019-01-11
Payer: MEDICAID

## 2019-01-11 VITALS
HEIGHT: 67 IN | BODY MASS INDEX: 22.44 KG/M2 | HEART RATE: 101 BPM | WEIGHT: 143 LBS | RESPIRATION RATE: 16 BRPM | TEMPERATURE: 97.5 F | SYSTOLIC BLOOD PRESSURE: 120 MMHG | DIASTOLIC BLOOD PRESSURE: 72 MMHG | OXYGEN SATURATION: 98 %

## 2019-01-11 DIAGNOSIS — Q04.0 AGENESIS OF CORPUS CALLOSUM (HCC): ICD-10-CM

## 2019-01-11 DIAGNOSIS — G40.909 NONINTRACTABLE EPILEPSY WITHOUT STATUS EPILEPTICUS, UNSPECIFIED EPILEPSY TYPE (HCC): Primary | ICD-10-CM

## 2019-01-11 PROCEDURE — 99214 OFFICE O/P EST MOD 30 MIN: CPT | Performed by: FAMILY MEDICINE

## 2019-01-11 ASSESSMENT — ENCOUNTER SYMPTOMS
GASTROINTESTINAL NEGATIVE: 1
ABDOMINAL PAIN: 0
RESPIRATORY NEGATIVE: 1
VOMITING: 0
ALLERGIC/IMMUNOLOGIC NEGATIVE: 1
EYES NEGATIVE: 1
NAUSEA: 0

## 2019-01-11 ASSESSMENT — PATIENT HEALTH QUESTIONNAIRE - PHQ9
SUM OF ALL RESPONSES TO PHQ9 QUESTIONS 1 & 2: 0
2. FEELING DOWN, DEPRESSED OR HOPELESS: 0
DEPRESSION UNABLE TO ASSESS: FUNCTIONAL CAPACITY MOTIVATION LIMITS ACCURACY
1. LITTLE INTEREST OR PLEASURE IN DOING THINGS: 0
SUM OF ALL RESPONSES TO PHQ QUESTIONS 1-9: 0
SUM OF ALL RESPONSES TO PHQ QUESTIONS 1-9: 0

## 2019-01-17 LAB
ACANTHOCYTES: 0
ALBUMIN SERPL-MCNC: 4.3 G/DL (ref 3.9–4.9)
ALP BLD-CCNC: 91 U/L (ref 35–104)
ALT SERPL-CCNC: 17 U/L (ref 0–41)
ANION GAP SERPL CALCULATED.3IONS-SCNC: 10 MEQ/L (ref 7–13)
ANISOCYTOSIS: 0
AST SERPL-CCNC: 20 U/L (ref 0–40)
ATYPICAL LYMPHOCYTE RELATIVE PERCENT: 2 %
AUER RODS: 0
BANDED NEUTROPHILS RELATIVE PERCENT: 2 %
BASOPHILIC STIPPLING: 0
BASOPHILS ABSOLUTE: 0 K/UL (ref 0–0.2)
BASOPHILS RELATIVE PERCENT: 1 %
BILIRUB SERPL-MCNC: 0.5 MG/DL (ref 0–1.2)
BUN BLDV-MCNC: 11 MG/DL (ref 6–20)
BURR CELLS: 0
CABOT RINGS: 0
CALCIUM SERPL-MCNC: 9.2 MG/DL (ref 8.6–10.2)
CHLORIDE BLD-SCNC: 104 MEQ/L (ref 98–107)
CO2: 27 MEQ/L (ref 22–29)
CREAT SERPL-MCNC: 0.58 MG/DL (ref 0.7–1.2)
DOHLE BODIES: 0
EOSINOPHILS ABSOLUTE: 0.1 K/UL (ref 0–0.7)
EOSINOPHILS RELATIVE PERCENT: 2 %
GFR AFRICAN AMERICAN: >60
GFR NON-AFRICAN AMERICAN: >60
GLOBULIN: 2.1 G/DL (ref 2.3–3.5)
GLUCOSE BLD-MCNC: 97 MG/DL (ref 74–109)
HAIRY CELLS: 0
HCT VFR BLD CALC: 38.7 % (ref 42–52)
HEMOGLOBIN: 13.6 G/DL (ref 14–18)
HOWELL-JOLLY BODIES: 0
HYPERSEGMENTED NEUTROPHILS: 0
HYPOCHROMIA: 0
LYMPHOCYTES ABSOLUTE: 1.4 K/UL (ref 1–4.8)
LYMPHOCYTES RELATIVE PERCENT: 35 %
MACROCYTES: 0
MAGNESIUM: 2.1 MG/DL (ref 1.7–2.3)
MCH RBC QN AUTO: 31.6 PG (ref 27–31.3)
MCHC RBC AUTO-ENTMCNC: 35.2 % (ref 33–37)
MCV RBC AUTO: 90 FL (ref 80–100)
MICROCYTES: 0
MONOCYTES ABSOLUTE: 0.3 K/UL (ref 0.2–0.8)
MONOCYTES RELATIVE PERCENT: 7.7 %
NEUTROPHILS ABSOLUTE: 2 K/UL (ref 1.4–6.5)
NEUTROPHILS RELATIVE PERCENT: 51 %
OVALOCYTES: 0
PAPPENHEIMER BODIES: 0
PDW BLD-RTO: 14.6 % (ref 11.5–14.5)
PELGER HUET CELLS: 0 %
PLATELET # BLD: 186 K/UL (ref 130–400)
PLATELET SLIDE REVIEW: ADEQUATE
POIKILOCYTES: 0
POLYCHROMASIA: 0
POTASSIUM SERPL-SCNC: 4.2 MEQ/L (ref 3.5–5.1)
RBC # BLD: 4.3 M/UL (ref 4.7–6.1)
RBC # BLD: NORMAL 10*6/UL
SCHISTOCYTES: 0
SICKLE CELLS: 0
SMUDGE CELLS: 7.7
SODIUM BLD-SCNC: 141 MEQ/L (ref 132–144)
SPHEROCYTES: 0
STOMATOCYTES: 0
TARGET CELLS: 0
TEAR DROP CELLS: 0
TOTAL PROTEIN: 6.4 G/DL (ref 6.4–8.1)
TOXIC GRANULATION: 0
TSH SERPL DL<=0.05 MIU/L-ACNC: 2.57 UIU/ML (ref 0.27–4.2)
VACUOLATED NEUTROPHILS: 0
WBC # BLD: 3.7 K/UL (ref 4.8–10.8)

## 2019-02-01 ENCOUNTER — TELEPHONE (OUTPATIENT)
Dept: FAMILY MEDICINE CLINIC | Age: 24
End: 2019-02-01

## 2019-02-01 RX ORDER — GINSENG 100 MG
CAPSULE ORAL
Qty: 1 TUBE | Refills: 1 | Status: SHIPPED | OUTPATIENT
Start: 2019-02-01 | End: 2019-02-11

## 2019-02-14 LAB
ALBUMIN SERPL-MCNC: 4.2 G/DL (ref 3.5–4.6)
ALP BLD-CCNC: 107 U/L (ref 35–104)
ALT SERPL-CCNC: 20 U/L (ref 0–41)
ANION GAP SERPL CALCULATED.3IONS-SCNC: 13 MEQ/L (ref 9–15)
AST SERPL-CCNC: 18 U/L (ref 0–40)
BASOPHILS ABSOLUTE: 0 K/UL (ref 0–0.2)
BASOPHILS RELATIVE PERCENT: 1.2 %
BILIRUB SERPL-MCNC: 0.5 MG/DL (ref 0.2–0.7)
BUN BLDV-MCNC: 10 MG/DL (ref 6–20)
CALCIUM SERPL-MCNC: 9.5 MG/DL (ref 8.5–9.9)
CHLORIDE BLD-SCNC: 102 MEQ/L (ref 95–107)
CHOLESTEROL, TOTAL: 88 MG/DL (ref 0–199)
CO2: 26 MEQ/L (ref 20–31)
CREAT SERPL-MCNC: 0.63 MG/DL (ref 0.7–1.2)
EOSINOPHILS ABSOLUTE: 0.1 K/UL (ref 0–0.7)
EOSINOPHILS RELATIVE PERCENT: 2.8 %
GFR AFRICAN AMERICAN: >60
GFR NON-AFRICAN AMERICAN: >60
GLOBULIN: 2.4 G/DL (ref 2.3–3.5)
GLUCOSE BLD-MCNC: 94 MG/DL (ref 70–99)
HCT VFR BLD CALC: 38.9 % (ref 42–52)
HDLC SERPL-MCNC: 67 MG/DL (ref 40–59)
HEMOGLOBIN: 13.7 G/DL (ref 14–18)
LDL CHOLESTEROL CALCULATED: 15 MG/DL (ref 0–129)
LYMPHOCYTES ABSOLUTE: 1.4 K/UL (ref 1–4.8)
LYMPHOCYTES RELATIVE PERCENT: 35.8 %
MCH RBC QN AUTO: 31.3 PG (ref 27–31.3)
MCHC RBC AUTO-ENTMCNC: 35.1 % (ref 33–37)
MCV RBC AUTO: 89.2 FL (ref 80–100)
MONOCYTES ABSOLUTE: 0.4 K/UL (ref 0.2–0.8)
MONOCYTES RELATIVE PERCENT: 9.6 %
NEUTROPHILS ABSOLUTE: 2 K/UL (ref 1.4–6.5)
NEUTROPHILS RELATIVE PERCENT: 50.6 %
PDW BLD-RTO: 13.5 % (ref 11.5–14.5)
PLATELET # BLD: 183 K/UL (ref 130–400)
PLATELET SLIDE REVIEW: ADEQUATE
POTASSIUM SERPL-SCNC: 3.8 MEQ/L (ref 3.4–4.9)
RBC # BLD: 4.36 M/UL (ref 4.7–6.1)
SLIDE REVIEW: ABNORMAL
SODIUM BLD-SCNC: 141 MEQ/L (ref 135–144)
TOTAL PROTEIN: 6.6 G/DL (ref 6.3–8)
TRIGL SERPL-MCNC: 28 MG/DL (ref 0–150)
TSH SERPL DL<=0.05 MIU/L-ACNC: 3.2 UIU/ML (ref 0.44–3.86)
VITAMIN D 25-HYDROXY: 52.4 NG/ML (ref 30–100)
WBC # BLD: 3.9 K/UL (ref 4.8–10.8)

## 2019-02-15 LAB — LAMOTRIGINE LEVEL: 5.6 UG/ML (ref 2.5–15)

## 2019-03-01 ENCOUNTER — TELEPHONE (OUTPATIENT)
Dept: FAMILY MEDICINE CLINIC | Age: 24
End: 2019-03-01

## 2019-05-28 ENCOUNTER — HOSPITAL ENCOUNTER (OUTPATIENT)
Dept: NON INVASIVE DIAGNOSTICS | Age: 24
Discharge: HOME | End: 2019-05-28
Payer: MEDICAID

## 2019-05-28 LAB
EKG ATRIAL RATE: 85 BPM
EKG P AXIS: 83 DEGREES
EKG P-R INTERVAL: 148 MS
EKG Q-T INTERVAL: 366 MS
EKG QRS DURATION: 88 MS
EKG QTC CALCULATION (BAZETT): 435 MS
EKG R AXIS: 104 DEGREES
EKG T AXIS: 41 DEGREES
EKG VENTRICULAR RATE: 85 BPM

## 2019-05-28 PROCEDURE — 93010 ELECTROCARDIOGRAM REPORT: CPT | Performed by: INTERNAL MEDICINE

## 2019-05-28 PROCEDURE — 93005 ELECTROCARDIOGRAM TRACING: CPT | Performed by: NURSE PRACTITIONER

## 2019-06-06 ENCOUNTER — OFFICE VISIT (OUTPATIENT)
Dept: FAMILY MEDICINE CLINIC | Age: 24
End: 2019-06-06
Payer: MEDICAID

## 2019-06-06 VITALS
TEMPERATURE: 98.4 F | BODY MASS INDEX: 22.44 KG/M2 | HEART RATE: 74 BPM | DIASTOLIC BLOOD PRESSURE: 70 MMHG | OXYGEN SATURATION: 98 % | WEIGHT: 143 LBS | SYSTOLIC BLOOD PRESSURE: 110 MMHG | HEIGHT: 67 IN

## 2019-06-06 DIAGNOSIS — R26.81 GAIT INSTABILITY: Primary | ICD-10-CM

## 2019-06-06 PROCEDURE — 99213 OFFICE O/P EST LOW 20 MIN: CPT | Performed by: FAMILY MEDICINE

## 2019-06-06 RX ORDER — RISPERIDONE 3 MG/1
3 TABLET, FILM COATED ORAL 2 TIMES DAILY
COMMUNITY
End: 2019-07-03

## 2019-06-06 NOTE — PROGRESS NOTES
Chief Complaint   Patient presents with   Alondra Gonzalez Doctor     needs checked for posture        HPI: Victoriano Espinoza 25 y.o. male presenting for     Posture and gait instability  When he walks he trips. Has been going for 5 years. Patient does not have a cane to help with his walking. Patient denies any fever, chills, nausea ,vomiting, chest pain, shortness of breath. Patients next annual vision is 6/19/19. Per caregiver patient's gait was has been stable since he came to [employer] (came when he was 23years old). Current Outpatient Medications   Medication Sig Dispense Refill    risperiDONE (RISPERDAL) 3 MG tablet Take 3 mg by mouth 2 times daily      risperiDONE (RISPERDAL M-TABS) 2 MG disintegrating tablet Take 2 mg by mouth 2 times daily      Artificial Tear Solution (GENTEAL TEARS) 0.1-0.2-0.3 % SOLN Apply 1 drop to eye 2 times daily 1 Bottle 3    cetirizine (ZYRTEC ALLERGY) 10 MG tablet Take 1 tablet by mouth daily 30 tablet 5    Cholecalciferol (VITAMIN D) 2000 UNITS CAPS capsule Take by mouth daily      Multiple Vitamin (MULTI VITAMIN DAILY PO) Take by mouth daily      lamoTRIgine (LAMICTAL) 100 MG tablet Take 150 mg by mouth 2 times daily      FLUoxetine (PROZAC) 10 MG tablet Take 5 mg by mouth daily      clonazePAM (KLONOPIN) 1 MG tablet Take 1 tablet by mouth 3 times daily as needed for Anxiety for up to 90 days. . 90 tablet 2     No current facility-administered medications for this visit. ROS  CONSTITUTIONAL: The patient denies fevers, chills, sweats and body ache. HEENT: Denies headache, blurry vision, eye pain, tinnitus, vertigo,  sore throat, neck or thyroid masses. Vision changes. RESPIRATORY: Denies cough, sputum, hemoptysis. CARDIAC: Denies chest pain, pressure, palpitations, Denies lower extremity edema.   GASTROINTESTINAL: Denies abdominal pain, constipation, diarrhea, bleeding in the stools,   GENITOURINARY: Denies dysuria, hematuria, nocturia or frequency, urinary incontinence. NEUROLOGIC: Denies headaches, dizziness, syncope, weakness  MUSCULOSKELETAL: denies changes in range of motion, joint pain, stiffness. ENDOCRINOLOGY: Denies heat or cold intolerance. HEMATOLOGY: Denies easy bleeding or blood transfusion,anemia  DERMATOLOGY: Denies changes in moles or pigmentation changes. PSYCHIATRY: Denies depression, agitation or anxiety. Past Medical History:   Diagnosis Date    ADHD (attention deficit hyperactivity disorder)     Anxiety     Epilepsy ([facility] [address]     Myopia     Nystagmus     Optic nerve hypoplasia         History reviewed. No pertinent surgical history. History reviewed. No pertinent family history.      Social History     Socioeconomic History    Marital status: Single     Spouse name: Not on file    Number of children: Not on file    Years of education: Not on file    Highest education level: Not on file   Occupational History    Not on file   Social Needs    Financial resource strain: Not on file    Food insecurity:     Worry: Not on file     Inability: Not on file    Transportation needs:     Medical: Not on file     Non-medical: Not on file   Tobacco Use    Smoking status: Never Smoker    Smokeless tobacco: Never Used   Substance and Sexual Activity    Alcohol use: No     Alcohol/week: 0.0 oz    Drug use: No    Sexual activity: Not on file   Lifestyle    Physical activity:     Days per week: Not on file     Minutes per session: Not on file    Stress: Not on file   Relationships    Social connections:     Talks on phone: Not on file     Gets together: Not on file     Attends Holiness service: Not on file     Active member of club or organization: Not on file     Attends meetings of clubs or organizations: Not on file     Relationship status: Not on file    Intimate partner violence:     Fear of current or ex partner: Not on file     Emotionally abused: Not on file     Physically abused: Not on file     Forced sexual activity: Not on file   Other Topics Concern    Not on file   Social History Narrative    Not on file        /70 (Site: Right Upper Arm, Position: Sitting, Cuff Size: Medium Adult)   Pulse 74   Temp 98.4 °F (36.9 °C)   Ht 5' 7\" (1.702 m)   Wt 143 lb (64.9 kg)   SpO2 98%   BMI 22.40 kg/m²        Physical Exam:    General appearance - alert, well appearing, and in no distress  Mental Status - alert, oriented to person, place, and time  Eyes - bifocals, nystagmus noted on the left eye. Ears - bilateral TM's and external ear canals normal   Nose - normal and patent, no erythema, discharge or polyps   Sinuses - Normal paranasal sinuses without tenderness   Throat - mucous membranes moist, pharynx normal without lesions   Neck - supple, no significant adenopathy   Thyroid - thyroid is normal in size without nodules or tenderness    Chest - clear to auscultation, no wheezes, rales or rhonchi, symmetric air entry   Heart - normal rate, regular rhythm, normal S1, S2, no murmurs, rubs, clicks or gallops  Abdomen - soft, nontender, nondistended, no masses or organomegaly   Back exam - full range of motion, no tenderness, palpable spasm or pain on motion   Neurological - alert, oriented, normal speech, no focal findings or movement disorder noted   Musculoskeletal - fidgety    Extremities - peripheral pulses normal, no pedal edema, no clubbing or cyanosis   Skin - normal coloration and turgor, no rashes, no suspicious skin lesions noted  Back- patient is able to stand straight, when there for prolonged periods of time he slouches to the left side. When patient walks he leans forward.       Labs   TSH   Date Value Ref Range Status   06/01/2018 2.060 0.270 - 4.200 uIU/mL Final   01/19/2018 2.770 0.270 - 4.200 uIU/mL Final   03/08/2016 2.900 0.270 - 4.200 uIU/mL Final     TSH   Date Value Ref Range Status   02/14/2019 3.200 0.440 - 3.860 uIU/mL Final     Comment:     Effective:  2/7/2019  New reference range for

## 2019-06-24 ENCOUNTER — HOSPITAL ENCOUNTER (OUTPATIENT)
Dept: PHYSICAL THERAPY | Age: 24
Setting detail: THERAPIES SERIES
Discharge: HOME | End: 2019-06-24
Payer: MEDICAID

## 2019-06-24 PROCEDURE — 97162 PT EVAL MOD COMPLEX 30 MIN: CPT

## 2019-06-24 ASSESSMENT — PAIN DESCRIPTION - LOCATION: LOCATION: KNEE

## 2019-06-24 ASSESSMENT — PAIN DESCRIPTION - ORIENTATION: ORIENTATION: RIGHT

## 2019-06-24 ASSESSMENT — PAIN SCALES - WONG BAKER: WONGBAKER_NUMERICALRESPONSE: 2

## 2019-06-24 NOTE — PROGRESS NOTES
[address] [facility], [facility]     Ph: [phone]  Fax: [phone]    [x] Certification  [] Recertification []  Plan of Care  [] Progress Note [] Discharge      To:  Dr. Joyce Moncada      From:  Emily Kearney, PT  Patient: Sintia Du     : 1995  Diagnosis: Gait instability     Date: 2019  Treatment Diagnosis: Gait instability       Progress Report Period from:  2019  to 2019    Total # of Visits to Date: 1   No Show: 0    Canceled Appointment: 0     OBJECTIVE:   Long Term Goals - Time Frame for Long term goals : 5 weeks  Goals Current/ Discharge status Met   Long term goal 1: Pt will demonstrates nick DF ROM WFL to increase gait quality. PROM RLE (degrees)  RLE General PROM: DF 0deg     PROM LLE (degrees)  LLE General PROM: DF 4deg [] yes  [] no   Long term goal 2: Pt will ambulate with improved nick heel strike and upright posture with decreased overall deviations with minimal cuing. Ambulation 1  Surface: carpet  Device: No Device  Assistance: Supervision, Independent  Quality of Gait: FWD flexed with increased FWD momentum, absent heel strike, decreased nick foot clearance, decreased trunk rot and arm swing, occasionally deviates from path  Distance: [de-identified]' [] yes  [] no   Long term goal 3: Renteria >/= 52/56 to reduce risk for falls. Renteria Balance Score: 49 [] yes  [] no   Long term goal 4: Improve nick LE strength by 1/2 MMT grade to improve stability with standing and walking. Strength RLE  Strength RLE: WFL  Comment: Except DF 4+/5  Strength LLE  Strength LLE: WFL  Comment: Except DF 4+/5 [] yes  [] no   Long term goal 5: Aides will report >/= 50% reduction in path deviations when ambulating with improved overall balance.  Aide reports pt tends to veer and seems off balance, tends to have a lot of FWD momentum [] yes  [] no        Body structures, Functions, Activity limitations: Decreased functional mobility , Decreased ROM, Decreased strength, Decreased balance, Decreased coordination, Increased Pain  Assessment: Pt presents with worsening of posture, balance and gait quality recently. Pt demonstrates decreased nick DF PROM and strength likely contributing to his gait deviations. He sits, stands and ambulates with a FWD flexed posture likely contributing to any FWD LOB. Pt is not at an increased risk for falls per Adia Peals but does demosntrates some unsteadiness with static and dyanmic balance. Pt ambulates with no heel strike with decreased nick foot clearance, decreased arm swing and FWD flexed posture with some FWD momentum. Pt would benefit from further skilled PT to improve his strength, ROM and balance with all standing and walking. Prognosis: Good  Discharge Recommendations: Continue to assess pending progress    PLAN: [x] Evaluate and Treat  Frequency/Duration:  Plan  Times per week: 1-2  Plan weeks: 5  Current Treatment Recommendations: Strengthening, ROM, Balance Training, Functional Mobility Training, Gait Training, Neuromuscular Re-education, Manual Therapy - Soft Tissue Mobilization, Home Exercise Program, Safety Education & Training, Patient/Caregiver Education & Training, Equipment Evaluation, Education, & procurement, Modalities     Precautions:fall                  Patient Status:[x] Continue/ Initiate plan of Care    [] Discharge PT. Recommend pt continue with HEP. [] Additional visits requested, Please re-certify for additional visits:          Signature: Electronically signed by Sudheer Alas PT on 6/24/19 at 5:01 PM      If you have any questions or concerns, please don't hesitate to call. Thank you for your referral.    I have reviewed this plan of care and certify a need for medically necessary rehabilitation services.     Physician Signature:__________________________________________________________  Date:  Please sign and return

## 2019-06-24 NOTE — PROGRESS NOTES
[address]  PHYSICAL THERAPY EVALUATION    Date: 2019  Patient Name: Lakeshia Horta       MRN: [id number]   Account: [de-identified]   : 1995  (25 y.o.)   Gender: male   Referring Practitioner: Dr. Trang Manning                 Diagnosis: Gait instability  Treatment Diagnosis: Gait instability  Additional Pertinent Hx: Seizures, Anxiety, Resting nystagmus             Past Medical History:  has a past medical history of ADHD (attention deficit hyperactivity disorder), Anxiety, Epilepsy ([address] Myopia, Nystagmus, and Optic nerve hypoplasia. Past Surgical History:   has no past surgical history on file. Vital Signs  Patient Currently in Pain: Yes   Pain Screening  Patient Currently in Pain: Yes  Pain Assessment  Pain Assessment: Faces  Garza-Baker Pain Rating: Hurts a little bit  Pain Location: Knee  Pain Orientation: Right        Lives With: (Group home)  Type of Home: House  Home Layout: One level  ADL Assistance: Independent  Homemaking Assistance: Needs assistance  Ambulation Assistance: Independent  Transfer Assistance: Independent  Active : No        Subjective:  Subjective: Walking and posture is worsening. When going down osei tends to go too fast and almost look like he's fallen. Pt loses balance at times. Pt reports Rt knee hurts all of the time. Seems as if he stumbles forward - trips and catches toe about 10 times a day which worsens with going faster.          Objective:   Balance  Comments: Renteria = 49/56    Ambulation 1  Surface: carpet  Device: No Device  Assistance: Supervision, Independent  Quality of Gait: FWD flexed with increased FWD momentum, absent heel strike, decreased nick foot clearance, decreased trunk rot and arm swing, occasionally deviates from path  Distance: 80'    Strength RLE  Strength RLE: WFL  Comment: Except DF 4+/5  Strength LLE  Strength LLE: WFL  Comment: Except DF 4+/5    PROM RLE (degrees)  RLE General PROM: DF 0deg PROM LLE (degrees)  LLE General PROM: DF 4deg    Exercises:   Exercises  Exercise 1: Gastroc str*  Exercise 2: NuStep*  Exercise 3: Rows/lats*  Exercise 4: Standing with nick shldr flex*  Exercise 5: Prone lying*  Exercise 6: Hip flexor str*  Exercise 7: Single stepping*  Exercise 8: Gait drills*  Exercise 20: HEP: gastroc str, nick shoulder flex, amb with increased heel strike  *Indicates exercise,modality, or manual techniques to be initiated when appropriate    Assessment: Body structures, Functions, Activity limitations: Decreased functional mobility , Decreased ROM, Decreased strength, Decreased balance, Decreased coordination, Increased Pain  Assessment: Pt presents with worsening of posture, balance and gait quality recently. Pt demonstrates decreased nick DF PROM and strength likely contributing to his gait deviations. He sits, stands and ambulates with a FWD flexed posture likely contributing to any FWD LOB. Pt is not at an increased risk for falls per Carter Young but does demosntrates some unsteadiness with static and dyanmic balance. Pt ambulates with no heel strike with decreased nick foot clearance, decreased arm swing and FWD flexed posture with some FWD momentum. Pt would benefit from further skilled PT to improve his strength, ROM and balance with all standing and walking. Prognosis: Good  Discharge Recommendations: Continue to assess pending progress  Activity Tolerance: Patient Tolerated treatment well     Decision Making: Medium Complexity  History: PMH: Seizures, Anxiety, Resting nystagmus  Exam: Decreased ROM and strength with increased pain and decreased balance impacting functional mobility and safety.   Clinical Presentation: Evolving        Plan  Frequency/Duration:  Plan  Times per week: 1-2  Plan weeks: 5  Current Treatment Recommendations: Strengthening, ROM, Balance Training, Functional Mobility Training, Gait Training, Neuromuscular Re-education, Manual Therapy - Soft Tissue Mobilization, Home Exercise Program, Safety Education & Training, Patient/Caregiver Education & Training, Equipment Evaluation, Education, & procurement, Modalities     POST-PAIN     Pain Rating (0-10 pain scale):  0 /10  Location and pain description same as pre-treatment unless indicated. Action: [x] NA  [] Call Physician  [] Perform HEP  [] Meds as prescribed    Evaluation and patient rights have been reviewed and patient agrees with plan of care. Yes  [x]  No  []   Explain:       Mckenzie Fall Risk Assessment  Risk Factor Scale  Score   History of Falls [] Yes  [x] No 25  0 0   Secondary Diagnosis [x] Yes  [] No 15  0 15   Ambulatory Aid [] Furniture  [] Crutches/cane/walker  [x] None/bedrest/wheelchair/nurse 30  15  0 0   IV/Heparin Lock [] Yes  [x] No 20  0 0   Gait/Transferring [x] Impaired  [] Weak  [] Normal/bedrest/immobile 20  10  0 20   Mental Status [] Forgets limitations  [] Oriented to own ability 15  0 0      Total:35     Based on the Assessment score: check the appropriate box. []  No intervention needed   Low =   Score of 0-24  [x]  Use standard prevention interventions Moderate =  Score of 24-44   [x] Discuss fall prevention strategies   [x] Indicate moderate falls risk on eval  []  Use high risk prevention interventions High = Score of 45 and higher   [] Discuss fall prevention strategies   [] Provide supervision during treatment time    Goals  Long term goals  Time Frame for Long term goals : 5 weeks  Long term goal 1: Pt will demonstrates nick DF ROM WFL to increase gait quality. Long term goal 2: Pt will ambulate with improved nick heel strike and upright posture with decreased overall deviations with minimal cuing. Long term goal 3: Renteria >/= 52/56 to reduce risk for falls. Long term goal 4: Improve nick LE strength by 1/2 MMT grade to improve stability with standing and walking. Long term goal 5: Aides will report >/= 50% reduction in path deviations when ambulating with improved overall balance.

## 2019-08-01 ENCOUNTER — TELEPHONE (OUTPATIENT)
Dept: FAMILY MEDICINE CLINIC | Age: 24
End: 2019-08-01

## 2019-08-01 DIAGNOSIS — R26.9 ABNORMALITY OF GAIT AND MOBILITY: Primary | ICD-10-CM

## 2019-08-06 ENCOUNTER — CLINICAL DOCUMENTATION (OUTPATIENT)
Dept: PHYSICAL THERAPY | Age: 24
End: 2019-08-06

## 2019-08-07 DIAGNOSIS — F41.9 ANXIETY: Primary | ICD-10-CM

## 2019-08-07 RX ORDER — FLUOXETINE HYDROCHLORIDE 40 MG/1
40 CAPSULE ORAL DAILY
Qty: 90 CAPSULE | Refills: 3 | COMMUNITY
Start: 2019-08-07

## 2019-08-07 RX ORDER — FLUOXETINE 10 MG/1
10 CAPSULE ORAL DAILY
Qty: 30 CAPSULE | Refills: 3 | COMMUNITY
Start: 2019-08-07 | End: 2021-07-28 | Stop reason: CLARIF

## 2019-08-07 RX ORDER — RISPERIDONE 1 MG/1
1 TABLET, FILM COATED ORAL 2 TIMES DAILY
Qty: 60 TABLET | Refills: 3 | COMMUNITY
Start: 2019-08-07 | End: 2020-07-13

## 2019-08-27 ENCOUNTER — HOSPITAL ENCOUNTER (OUTPATIENT)
Dept: PHYSICAL THERAPY | Age: 24
Setting detail: THERAPIES SERIES
Discharge: HOME | End: 2019-08-27
Payer: MEDICAID

## 2019-08-27 PROCEDURE — 97110 THERAPEUTIC EXERCISES: CPT

## 2019-08-27 PROCEDURE — 97162 PT EVAL MOD COMPLEX 30 MIN: CPT

## 2019-08-27 NOTE — PROGRESS NOTES
ext 4-/5  Strength LLE  Strength LLE: WFL  Comment: Except DF 4+/5, hip abd 4/5, hip ext 4-/5    PROM RLE (degrees)  RLE General PROM: DF 0deg, decreased hams flexibility     PROM LLE (degrees)  LLE General PROM: DF 6deg, decreased hams flexibility     Bed mobility  Supine to Sit: Independent  Sit to Supine: Independent    Exercises:   Exercises  Exercise 1: Seated hams str 30 x2 nick  Exercise 2: Scap retractions x10  Exercise 3: Standing nick shoulder flex at wall to improve posture x10  Exercise 4: Prone lying with reach*  Exercise 5: Hip flexor str*  Exercise 6: SLR*  Exercise 7: Clams*  Exercise 8: Bridges*  Exercise 9: Single stepping with reach*  Exercise 10: Gait drills*  Exercise 11: Rows/lats*  Exercise 20: HEP: hams str, scap retractions  *Indicates exercise,modality, or manual techniques to be initiated when appropriate    Assessment: Body structures, Functions, Activity limitations: Decreased functional mobility , Decreased ROM, Decreased strength, Decreased balance, Decreased coordination, Increased Pain  Assessment: Pt presents with continued nick knee pain with a decline in walking and balance. Pt demonstrates decreased nick hamstrings and DF ROM. Pt also with decreased nick hip strength as well as poor posture. Pt is not at a high risk for falls but does demonstrate somee balance deficits. Pt prefers to ambualte with a FWD flexed posture with decreased foot clearance and increased FWD momentum. Pt would benefit from further skilled PT to improve his strength, balance, posture and gait to increase his safety with mobility. Prognosis: Good  Discharge Recommendations: Continue to assess pending progress  Activity Tolerance: Patient Tolerated treatment well     Decision Making: Medium Complexity  History: PMH: Seizures, Anxiety, Resting nystagmus  Exam: Decreased ROM and strength with increased pain and decreased balance impacting functional mobility and safety.   Clinical Presentation: Evolving heel strike and upright posture with decreased overall deviations with minimal cuing. Long term goal 3: Renteria >/= 51/56 to reduce risk for falls. Long term goal 4: Improve nick LE strength by 1/2 MMT grade to improve stability with standing and walking. Long term goal 5: Aides will report >/= 50% reduction in path deviations when ambulating with improved overall balance.          PT Individual Minutes  Time In: 0906  Time Out: 9909  Minutes: 36  Timed Code Treatment Minutes: 8 Minutes  Procedure Minutes: 28'     Timed Activity Minutes Units   Ther Ex 8 1       Electronically signed by James Samuels PT on 8/27/19 at 9:52 AM

## 2019-09-06 ENCOUNTER — HOSPITAL ENCOUNTER (OUTPATIENT)
Dept: PHYSICAL THERAPY | Age: 24
Setting detail: THERAPIES SERIES
Discharge: HOME | End: 2019-09-06
Payer: MEDICAID

## 2019-09-06 PROCEDURE — 97112 NEUROMUSCULAR REEDUCATION: CPT

## 2019-09-06 PROCEDURE — 97110 THERAPEUTIC EXERCISES: CPT

## 2019-09-13 ENCOUNTER — HOSPITAL ENCOUNTER (OUTPATIENT)
Dept: PHYSICAL THERAPY | Age: 24
Setting detail: THERAPIES SERIES
Discharge: HOME | End: 2019-09-13
Payer: MEDICAID

## 2019-09-13 PROCEDURE — 97112 NEUROMUSCULAR REEDUCATION: CPT

## 2019-09-13 PROCEDURE — 97110 THERAPEUTIC EXERCISES: CPT

## 2019-09-13 NOTE — PROGRESS NOTES
Pain  Assessment: Continued stretching to improve rom and flexibility to improve posture and gait quality. Pt follows well with vc's for correct techniques, occassionally hits himself if corrected for form. Pt with improving heel strike and foot clearance with cueing, poor<> fair carryover. Pt required 2 seated rb's d/t fatigue, possibly due to not sleeping well per pt. Treatment Diagnosis: Gait instability  Prognosis: Good       Goals:       Long term goals  Time Frame for Long term goals : 5 weeks  Long term goal 1: Pt will demonstrates nick DF ROM WFL to increase gait quality. Long term goal 2: Pt will ambulate >250' with improved nick heel strike and upright posture with decreased overall deviations with minimal cuing. Long term goal 3: Renteria >/= 51/56 to reduce risk for falls. Long term goal 4: Improve nick LE strength by 1/2 MMT grade to improve stability with standing and walking. Long term goal 5: Aides will report >/= 50% reduction in path deviations when ambulating with improved overall balance. Progress toward goals:balance, rom, gait    POST-PAIN       Pain Rating (0-10 pain scale):  0 /10   Location and pain description same as pre-treatment unless indicated. Action: [x] NA   [] Perform HEP  [] Meds as prescribed  [] Modalities as prescribed   [] Call Physician     Frequency/Duration:  Plan  Times per week: 1-2  Plan weeks: 5  Current Treatment Recommendations: Strengthening, ROM, Balance Training, Functional Mobility Training, Gait Training, Neuromuscular Re-education, Manual Therapy - Soft Tissue Mobilization, Home Exercise Program, Safety Education & Training, Patient/Caregiver Education & Training, Equipment Evaluation, Education, & procurement, Modalities     Pt to continue current HEP. See objective section for any therapeutic exercise changes, additions or modifications this date.          PT Individual Minutes  Time In: 8843  Time Out: 1000  Minutes: 55  Timed Code Treatment Minutes: 55

## 2019-09-20 ENCOUNTER — HOSPITAL ENCOUNTER (OUTPATIENT)
Dept: PHYSICAL THERAPY | Age: 24
Setting detail: THERAPIES SERIES
Discharge: HOME | End: 2019-09-20
Payer: MEDICAID

## 2019-09-20 PROCEDURE — 97116 GAIT TRAINING THERAPY: CPT

## 2019-09-20 PROCEDURE — 97112 NEUROMUSCULAR REEDUCATION: CPT

## 2019-09-20 PROCEDURE — 97110 THERAPEUTIC EXERCISES: CPT

## 2019-09-20 NOTE — PROGRESS NOTES
[id number] [address]  Outpatient Physical Therapy    Treatment Note        Date: 2019  Patient: Delmar Real  : 1995  ACCT #: [de-identified]  Referring Practitioner: Dr. Funmi Becker  Diagnosis: Abnormality of gait and mobility    Visit Information:  PT Visit Information  PT Insurance Information: Medicaid  Total # of Visits to Date: 4  No Show: 0  Canceled Appointment: 0  Progress Note Counter: 4/10    Subjective: Pt reports not feeling well, \" I have a sinus infection\"  Comments: Pt 10 min late for appt. Pt states \" I had problems this morning\"  HEP Compliance:  [x] Good [x] Fair [] Poor [] Reports not doing due to:    Vital Signs  Patient Currently in Pain: No   Pain Screening  Patient Currently in Pain: No    OBJECTIVE:   Exercises  Exercise 2: Air ex: with chest pulls YTB x 10, CGA  Exercise 6:  SLR x 10, quad lag corrected with VCs  Exercise 7: Clams x 10, nick, with YTB to inc strength  Exercise 8: Bridges 5\" x 10, on pball  Exercise 10: Gait drills: stepping over 6\" hurdles with dec'd clearance  Exercise 12: Corner stretch 20\" X 3  Exercise 14: Hip flexor, gastroc stretch on stairs 20\" x 2, b/l  Exercise 15: Nu step L3 x 7' for increased strengthening, x 2 min with le's only, 51 spm, 323 total steps  Exercise 16: rockerboard 2 way x 15 to inc ankle rom  Exercise 20: HEP: YTB for clamshells     Ambulation 1  Surface: level tile  Device: No Device  Assistance: Supervision, Independent, Stand by assistance  Quality of Gait: FWD flexed with increased , improving foot clearance with VC's with improved carryover  Gait Deviations: Decreased step height, Decreased arm swing, Decreased head and trunk rotation, Deviated path  Distance: 2 lap track         *Indicates exercise, modality, or manual techniques to be initiated when appropriate    Assessment:         Body structures, Functions, Activity limitations: Decreased functional mobility , Decreased ROM, Decreased strength, Decreased balance,

## 2019-09-27 ENCOUNTER — HOSPITAL ENCOUNTER (OUTPATIENT)
Dept: PHYSICAL THERAPY | Age: 24
Setting detail: THERAPIES SERIES
Discharge: HOME | End: 2019-09-27
Payer: MEDICAID

## 2019-09-27 PROCEDURE — 97112 NEUROMUSCULAR REEDUCATION: CPT

## 2019-09-27 PROCEDURE — 97110 THERAPEUTIC EXERCISES: CPT

## 2019-10-04 ENCOUNTER — HOSPITAL ENCOUNTER (OUTPATIENT)
Dept: PHYSICAL THERAPY | Age: 24
Setting detail: THERAPIES SERIES
Discharge: HOME | End: 2019-10-04
Payer: MEDICAID

## 2019-10-04 PROCEDURE — 97110 THERAPEUTIC EXERCISES: CPT

## 2019-10-04 PROCEDURE — 97112 NEUROMUSCULAR REEDUCATION: CPT

## 2019-10-11 ENCOUNTER — HOSPITAL ENCOUNTER (OUTPATIENT)
Dept: PHYSICAL THERAPY | Age: 24
Setting detail: THERAPIES SERIES
Discharge: HOME | End: 2019-10-11
Payer: MEDICAID

## 2019-10-11 PROCEDURE — 97110 THERAPEUTIC EXERCISES: CPT

## 2019-10-11 PROCEDURE — 97112 NEUROMUSCULAR REEDUCATION: CPT

## 2019-10-11 PROCEDURE — 97116 GAIT TRAINING THERAPY: CPT

## 2019-10-25 ENCOUNTER — HOSPITAL ENCOUNTER (OUTPATIENT)
Dept: PHYSICAL THERAPY | Age: 24
Setting detail: THERAPIES SERIES
Discharge: HOME | End: 2019-10-25
Payer: MEDICAID

## 2019-10-25 PROCEDURE — 97110 THERAPEUTIC EXERCISES: CPT

## 2019-10-25 PROCEDURE — 97116 GAIT TRAINING THERAPY: CPT

## 2019-11-01 ENCOUNTER — HOSPITAL ENCOUNTER (OUTPATIENT)
Dept: PHYSICAL THERAPY | Age: 24
Setting detail: THERAPIES SERIES
Discharge: HOME | End: 2019-11-01
Payer: MEDICAID

## 2019-11-01 PROCEDURE — 97112 NEUROMUSCULAR REEDUCATION: CPT

## 2019-11-01 PROCEDURE — 97116 GAIT TRAINING THERAPY: CPT

## 2019-11-01 PROCEDURE — 97110 THERAPEUTIC EXERCISES: CPT

## 2019-11-07 ENCOUNTER — OFFICE VISIT (OUTPATIENT)
Dept: FAMILY MEDICINE CLINIC | Age: 24
End: 2019-11-07
Payer: MEDICAID

## 2019-11-07 VITALS
TEMPERATURE: 98.4 F | WEIGHT: 155 LBS | HEIGHT: 67 IN | SYSTOLIC BLOOD PRESSURE: 110 MMHG | OXYGEN SATURATION: 98 % | DIASTOLIC BLOOD PRESSURE: 60 MMHG | HEART RATE: 92 BPM | BODY MASS INDEX: 24.33 KG/M2

## 2019-11-07 DIAGNOSIS — Z23 NEED FOR INFLUENZA VACCINATION: ICD-10-CM

## 2019-11-07 DIAGNOSIS — Z13.79 GENETIC SCREENING: Primary | ICD-10-CM

## 2019-11-07 PROCEDURE — 99213 OFFICE O/P EST LOW 20 MIN: CPT | Performed by: FAMILY MEDICINE

## 2020-01-13 ENCOUNTER — OFFICE VISIT (OUTPATIENT)
Dept: FAMILY MEDICINE CLINIC | Age: 25
End: 2020-01-13
Payer: MEDICAID

## 2020-01-13 VITALS
RESPIRATION RATE: 16 BRPM | TEMPERATURE: 97.6 F | DIASTOLIC BLOOD PRESSURE: 60 MMHG | BODY MASS INDEX: 23.86 KG/M2 | OXYGEN SATURATION: 98 % | WEIGHT: 152 LBS | HEIGHT: 67 IN | HEART RATE: 94 BPM | SYSTOLIC BLOOD PRESSURE: 90 MMHG

## 2020-01-13 PROCEDURE — 99395 PREV VISIT EST AGE 18-39: CPT | Performed by: FAMILY MEDICINE

## 2020-01-13 NOTE — PROGRESS NOTES
Chief Complaint   Patient presents with    Annual Exam     pt is here yearly physical         HPI: Katia Katz 25 y.o. male presenting for       Is coming in here today for a yearly physical.  No complaints from the group home. As you may recall from last visit patient was being seen and needed a referral for genetic screening rule out any Marfan's syndrome or fryn syndrome. Please refer to 11/7/2019 office note for more information. Group home is unsure if patient went to the genetic screening test.  Otherwise patient has been fairly stable. Patient is continuing to see the psychiatrist.  Did have recent adjustments to his medications. Denies any other issues at this time. Current Outpatient Medications   Medication Sig Dispense Refill    FLUoxetine (PROZAC) 40 MG capsule Take 1 capsule by mouth daily 90 capsule 3    FLUoxetine (PROZAC) 10 MG capsule Take 1 capsule by mouth daily 30 capsule 3    risperiDONE (RISPERDAL) 1 MG tablet Take 1 tablet by mouth 2 times daily 60 tablet 3    clonazePAM (KLONOPIN) 1 MG tablet Take 1 tablet by mouth 3 times daily as needed for Anxiety for up to 90 days. . 90 tablet 2    Artificial Tear Solution (GENTEAL TEARS) 0.1-0.2-0.3 % SOLN Apply 1 drop to eye 2 times daily 1 Bottle 3    cetirizine (ZYRTEC ALLERGY) 10 MG tablet Take 1 tablet by mouth daily 30 tablet 5    Cholecalciferol (VITAMIN D) 2000 UNITS CAPS capsule Take by mouth daily      Multiple Vitamin (MULTI VITAMIN DAILY PO) Take by mouth daily      lamoTRIgine (LAMICTAL) 100 MG tablet Take 150 mg by mouth 2 times daily       No current facility-administered medications for this visit. ROS]  CONSTITUTIONAL: The patient denies fevers, chills, sweats and body ache. HEENT: Denies headache, blurry vision, eye pain, tinnitus, vertigo,  sore throat, neck or thyroid masses. RESPIRATORY: Denies cough, sputum, hemoptysis.   CARDIAC: Denies chest pain, pressure, palpitations, Denies lower extremity

## 2020-02-17 LAB
ALBUMIN SERPL-MCNC: 4.3 G/DL (ref 3.5–4.6)
ALP BLD-CCNC: 96 U/L (ref 35–104)
ALT SERPL-CCNC: 25 U/L (ref 0–41)
AST SERPL-CCNC: 21 U/L (ref 0–40)
BASOPHILS ABSOLUTE: 0 K/UL (ref 0–0.2)
BASOPHILS RELATIVE PERCENT: 0.9 %
BILIRUB SERPL-MCNC: 0.5 MG/DL (ref 0.2–0.7)
BILIRUBIN DIRECT: <0.2 MG/DL (ref 0–0.4)
BILIRUBIN, INDIRECT: NORMAL MG/DL (ref 0–0.6)
EOSINOPHILS ABSOLUTE: 0.1 K/UL (ref 0–0.7)
EOSINOPHILS RELATIVE PERCENT: 2 %
HCT VFR BLD CALC: 41.3 % (ref 42–52)
HEMOGLOBIN: 14.2 G/DL (ref 14–18)
LYMPHOCYTES ABSOLUTE: 1.3 K/UL (ref 1–4.8)
LYMPHOCYTES RELATIVE PERCENT: 32.4 %
MCH RBC QN AUTO: 30.7 PG (ref 27–31.3)
MCHC RBC AUTO-ENTMCNC: 34.4 % (ref 33–37)
MCV RBC AUTO: 89.2 FL (ref 80–100)
MONOCYTES ABSOLUTE: 0.4 K/UL (ref 0.2–0.8)
MONOCYTES RELATIVE PERCENT: 9.8 %
NEUTROPHILS ABSOLUTE: 2.3 K/UL (ref 1.4–6.5)
NEUTROPHILS RELATIVE PERCENT: 54.9 %
PDW BLD-RTO: 13.7 % (ref 11.5–14.5)
PLATELET # BLD: 197 K/UL (ref 130–400)
RBC # BLD: 4.63 M/UL (ref 4.7–6.1)
TOTAL PROTEIN: 6.4 G/DL (ref 6.3–8)
WBC # BLD: 4.1 K/UL (ref 4.8–10.8)

## 2020-02-24 ENCOUNTER — OFFICE VISIT (OUTPATIENT)
Dept: NEUROLOGY | Age: 25
End: 2020-02-24
Payer: MEDICAID

## 2020-02-24 VITALS
WEIGHT: 151.6 LBS | SYSTOLIC BLOOD PRESSURE: 113 MMHG | DIASTOLIC BLOOD PRESSURE: 64 MMHG | BODY MASS INDEX: 23.74 KG/M2 | HEART RATE: 116 BPM

## 2020-02-24 PROBLEM — I69.30 MULTI INFARCT STATE: Status: ACTIVE | Noted: 2020-02-24

## 2020-02-24 PROBLEM — R41.3 MEMORY LOSS: Status: ACTIVE | Noted: 2020-02-24

## 2020-02-24 PROBLEM — I63.9 STROKE (CEREBRUM) (HCC): Status: ACTIVE | Noted: 2020-02-24

## 2020-02-24 PROBLEM — R47.1 DYSARTHRIA: Status: ACTIVE | Noted: 2020-02-24

## 2020-02-24 PROCEDURE — 99214 OFFICE O/P EST MOD 30 MIN: CPT | Performed by: PSYCHIATRY & NEUROLOGY

## 2020-02-24 RX ORDER — CLONAZEPAM 1 MG/1
1 TABLET ORAL 3 TIMES DAILY PRN
Qty: 90 TABLET | Refills: 2 | Status: SHIPPED | OUTPATIENT
Start: 2020-02-24 | End: 2021-01-28 | Stop reason: DRUGHIGH

## 2020-02-24 RX ORDER — CLONAZEPAM 1 MG/1
1 TABLET ORAL 3 TIMES DAILY PRN
Qty: 90 TABLET | Refills: 2 | Status: SHIPPED | OUTPATIENT
Start: 2020-02-24 | End: 2020-02-24

## 2020-02-24 ASSESSMENT — ENCOUNTER SYMPTOMS
CHOKING: 0
TROUBLE SWALLOWING: 0
VOMITING: 0
SHORTNESS OF BREATH: 0
NAUSEA: 0
BACK PAIN: 0
PHOTOPHOBIA: 0

## 2020-02-24 NOTE — PROGRESS NOTES
Subjective:      Patient ID: Chandni Abrams is a 25 y.o. male who presents today for:  Chief Complaint   Patient presents with    6 Month Follow-Up     Patients care giver states no recent siezures and gait is doing okay.  Other     go over results from lab work. HPI 80-year-old right-handed gentleman with a known history of generalized seizure disorder and  patient is on Lamictal and doing quite well. He is not had any  seizures for many years and he is on Lamictal 150 mg twice a day we will keep him on the same medication. He has gait ataxia from cerebral palsy occasionally tripping but no head injuries are reported. He is minor tremor on the right from Risperdal which has not changed. Last seen he had liver test done which are normal.  Patient also is on clonazepam 1 mg 3 times a day for anxiety as well as his seizures which we prescribed as neurologist.    Past Medical History:   Diagnosis Date    ADHD (attention deficit hyperactivity disorder)     Anxiety     Epilepsy ([address]     Myopia     Nystagmus     Optic nerve hypoplasia      No past surgical history on file.   Social History     Socioeconomic History    Marital status: Single     Spouse name: Not on file    Number of children: Not on file    Years of education: Not on file    Highest education level: Not on file   Occupational History    Not on file   Social Needs    Financial resource strain: Not on file    Food insecurity:     Worry: Not on file     Inability: Not on file    Transportation needs:     Medical: Not on file     Non-medical: Not on file   Tobacco Use    Smoking status: Never Smoker    Smokeless tobacco: Never Used   Substance and Sexual Activity    Alcohol use: No     Alcohol/week: 0.0 standard drinks    Drug use: No    Sexual activity: Not on file   Lifestyle    Physical activity:     Days per week: Not on file     Minutes per session: Not on file    Stress: Not on file   Relationships    Social connections:     Talks on phone: Not on file     Gets together: Not on file     Attends Zoroastrianism service: Not on file     Active member of club or organization: Not on file     Attends meetings of clubs or organizations: Not on file     Relationship status: Not on file    Intimate partner violence:     Fear of current or ex partner: Not on file     Emotionally abused: Not on file     Physically abused: Not on file     Forced sexual activity: Not on file   Other Topics Concern    Not on file   Social History Narrative    Not on file     No family history on file. No Known Allergies      Review of Systems   Constitutional: Negative for fever. HENT: Negative for ear pain, tinnitus and trouble swallowing. Eyes: Negative for photophobia and visual disturbance. Respiratory: Negative for choking and shortness of breath. Cardiovascular: Negative for chest pain and palpitations. Gastrointestinal: Negative for nausea and vomiting. Musculoskeletal: Negative for back pain, gait problem, joint swelling, myalgias, neck pain and neck stiffness. Neurological: Negative for dizziness, tremors, seizures, syncope, facial asymmetry, speech difficulty, weakness, light-headedness, numbness and headaches. Psychiatric/Behavioral: Negative for behavioral problems, confusion, hallucinations and sleep disturbance. Objective:   /64 (Site: Left Upper Arm, Position: Sitting, Cuff Size: Medium Adult)   Pulse 116   Wt 151 lb 9.6 oz (68.8 kg)   BMI 23.74 kg/m²     Physical Exam  Vitals signs reviewed. Eyes:      Pupils: Pupils are equal, round, and reactive to light. Neck:      Musculoskeletal: Normal range of motion. Cardiovascular:      Rate and Rhythm: Normal rate and regular rhythm. Heart sounds: No murmur. Pulmonary:      Effort: Pulmonary effort is normal.      Breath sounds: Normal breath sounds. Musculoskeletal: Normal range of motion.    Neurological:      Mental Status: He is alert and oriented to person, place, and time. Cranial Nerves: No cranial nerve deficit. Sensory: No sensory deficit. Motor: No abnormal muscle tone. Coordination: Coordination normal.      Deep Tendon Reflexes: Reflexes are normal and symmetric. Babinski sign absent on the right side. Babinski sign absent on the left side. Psychiatric:         Cognition and Memory: Cognition is impaired. Memory is impaired. Judgment: Judgment is impulsive. Has mild ataxia as well  No results found. Lab Results   Component Value Date    WBC 4.1 02/17/2020    RBC 4.63 02/17/2020    HGB 14.2 02/17/2020    HCT 41.3 02/17/2020    MCV 89.2 02/17/2020    MCH 30.7 02/17/2020    MCHC 34.4 02/17/2020    RDW 13.7 02/17/2020     02/17/2020     Lab Results   Component Value Date     10/03/2019    K 3.8 10/03/2019     10/03/2019    CO2 26 10/03/2019    BUN 11 10/03/2019    CREATININE 0.57 10/03/2019    GFRAA >60.0 10/03/2019    LABGLOM >60.0 10/03/2019    GLUCOSE 98 10/03/2019    PROT 6.4 02/17/2020    LABALBU 4.3 02/17/2020    CALCIUM 9.2 10/03/2019    BILITOT 0.5 02/17/2020    ALKPHOS 96 02/17/2020    AST 21 02/17/2020    ALT 25 02/17/2020     No results found for: PROTIME, INR  Lab Results   Component Value Date    TSH 2.280 10/03/2019     Lab Results   Component Value Date    TRIG 26 10/03/2019    HDL 56 10/03/2019    LDLCALC 17 10/03/2019     No results found for: Patti Olive, LABBENZ, CANNAB, COCAINESCRN, LABMETH, OPIATESCREENURINE, PHENCYCLIDINESCREENURINE, PPXUR, ETOH  No results found for: LITHIUM, DILFRTOT, VALPROATE    Assessment:       Diagnosis Orders   1. Nonintractable epilepsy without status epilepticus, unspecified epilepsy type ([facility]     2. Anxiety  clonazePAM (KLONOPIN) 1 MG tablet    CBC Auto Differential    Hepatic Function Panel   3. Abnormality of gait and mobility     4.  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

## 2020-07-13 ENCOUNTER — VIRTUAL VISIT (OUTPATIENT)
Dept: FAMILY MEDICINE CLINIC | Age: 25
End: 2020-07-13
Payer: MEDICAID

## 2020-07-13 PROCEDURE — 99213 OFFICE O/P EST LOW 20 MIN: CPT | Performed by: FAMILY MEDICINE

## 2020-07-13 ASSESSMENT — PATIENT HEALTH QUESTIONNAIRE - PHQ9
2. FEELING DOWN, DEPRESSED OR HOPELESS: 0
SUM OF ALL RESPONSES TO PHQ QUESTIONS 1-9: 0
SUM OF ALL RESPONSES TO PHQ QUESTIONS 1-9: 0
SUM OF ALL RESPONSES TO PHQ9 QUESTIONS 1 & 2: 0
1. LITTLE INTEREST OR PLEASURE IN DOING THINGS: 0

## 2020-07-13 ASSESSMENT — ENCOUNTER SYMPTOMS
CHEST TIGHTNESS: 0
SHORTNESS OF BREATH: 0
APNEA: 0
ANAL BLEEDING: 0
BLOOD IN STOOL: 0
EYE REDNESS: 0
COUGH: 0
SINUS PAIN: 0
BACK PAIN: 0
CONSTIPATION: 0
FACIAL SWELLING: 0
ABDOMINAL DISTENTION: 0
PHOTOPHOBIA: 0
EYE DISCHARGE: 0
COLOR CHANGE: 0
EYE ITCHING: 0
STRIDOR: 0

## 2020-07-13 NOTE — PROGRESS NOTES
2020    TELEHEALTH EVALUATION -- Audio/Visual (During [place]-57 public health emergency)    HPI:    Erin Loomis (:  1995) has requested an audio/video evaluation for the following concern(s):    Patient is here for 6-month follow-up. No issues or concerns at this time. Anxiety/ADHD  As you may recall, patient has a history of anxiety, ADHD. This is managed by a psychiatrist.  Patient was recently taken off of Risperdal after series of labs that were obtained showed that he has an elevated prolactin level. Patient was referred to endocrinology for further evaluation. Per patient, he has noticed milk expression from the breast.  Denies any recent change in his vision. Patient denies any fevers, chills, nausea, vomiting, chest pain, shortness of breath, abdominal pain, change in nation, change in stools. Patient has not been able to establish care with endocrinologist due to the coronavirus pandemic. Posture and gait instability  Stable at this time. When he walks he trips. Has been going for 5 years. Review of Systems   Constitutional: Negative for activity change, appetite change, diaphoresis and fatigue. HENT: Negative for congestion, dental problem, facial swelling, mouth sores, sinus pain and sneezing. Eyes: Negative for photophobia, discharge, redness and itching. Respiratory: Negative for apnea, cough, chest tightness, shortness of breath and stridor. Cardiovascular: Negative for chest pain, palpitations and leg swelling. Gastrointestinal: Negative for abdominal distention, anal bleeding, blood in stool and constipation. Endocrine: Negative for cold intolerance, heat intolerance and polyphagia. Genitourinary: Negative for difficulty urinating, discharge, dysuria, frequency, genital sores, penile swelling and testicular pain. Musculoskeletal: Positive for gait problem. Negative for arthralgias, back pain and myalgias.    Skin: Negative for color change, pallor, rash and wound. Allergic/Immunologic: Negative for environmental allergies and food allergies. Neurological: Negative for dizziness, syncope, facial asymmetry, weakness and numbness. Psychiatric/Behavioral: Negative for agitation, dysphoric mood and hallucinations. The patient is not hyperactive. Prior to Visit Medications    Medication Sig Taking? Authorizing Provider   [facility]icap [facility] Does not apply route Good from 3/4/20 - 3/4/25  Louisa Daly MD   clonazePAM (KLONOPIN) 1 MG tablet Take 1 tablet by mouth 3 times daily as needed for Anxiety for up to 90 days.   Cody Kulkarni MD   FLUoxetine (PROZAC) 40 MG capsule Take 1 capsule by mouth daily  Lei Ya MD   FLUoxetine (PROZAC) 10 MG capsule Take 1 capsule by mouth daily  Louisa Daly MD   Artificial Tear Solution (GENTEAL TEARS) 0.1-0.2-0.3 % SOLN Apply 1 drop to eye 2 times daily  Lucendia Ana M, DO   cetirizine (ZYRTEC ALLERGY) 10 MG tablet Take 1 tablet by mouth daily  Lucendia Ana M, DO   Cholecalciferol (VITAMIN D) 2000 UNITS CAPS capsule Take by mouth daily  Historical Provider, MD   Multiple Vitamin (MULTI VITAMIN DAILY PO) Take by mouth daily  Historical Provider, MD   lamoTRIgine (LAMICTAL) 100 MG tablet Take 150 mg by mouth 2 times daily  Historical Provider, MD       Social History     Tobacco Use    Smoking status: Never Smoker    Smokeless tobacco: Never Used   Substance Use Topics    Alcohol use: No     Alcohol/week: 0.0 standard drinks    Drug use: No        No Known Allergies,   Past Medical History:   Diagnosis Date    ADHD (attention deficit hyperactivity disorder)     Anxiety     Epile[address] ([facility] [address]     Myopia     Nystagmus     Optic nerve hypoplasia    , No past surgical history on file.,   Social History     Tobacco Use    Smoking status: Never Smoker    Smokeless tobacco: Never Used   Substance Use Topics    Alcohol use: No     Alcohol/week: 0.0 standard drinks    Drug use: No   , No family history on file.,   Immunization History   Administered Date(s) Administered    DTP 1995    DTP/HiB 1995, 01/16/1996    DTaP (Infanrix) 10/04/1996    HIB PRP-T (ActHIB, Hiberix) 1995, 07/05/1996    HPV Quadrivalent (Gardasil) 05/11/2012, 07/12/2012, 11/14/2012    Hepatitis A Vaccine 04/17/2009, 04/19/2010    Hepatitis B (Recombivax HB) 08/01/2017    Hepatitis B Ped/Adol (Engerix-B, Recombivax HB) 1995, 1995, 01/16/1996    Influenza Nasal 10/19/2011    Influenza Virus Vaccine 10/01/2013, 11/19/2018    Influenza, MDCK Quadv, IM, PF (Flucelvax 4 yrs and older) 10/25/2019    Influenza, Luis , IM, (6 mo and older Fluzone, Flulaval, Fluarix and 3 yrs and older Afluria) 11/14/2012, 10/27/2016, 10/27/2016, 09/19/2017, 11/19/2018    Influenza, Luis , IM, PF (6 mo and older Fluzone, Flulaval, Fluarix, and 3 yrs and older Afluria) 09/21/2015, 09/19/2017, 11/02/2018    MMR 04/18/1996, 11/13/2006    Meningococcal MCV4O (Menveo) 04/28/2011    Meningococcal MCV4P (Menactra) 04/07/2006    Polio IPV (IPOL) 1995, 1995, 10/04/1996, 10/04/1998, 04/19/2010    Tdap (Boostrix, Adacel) 04/07/2006, 04/28/2011    Varicella (Varivax) 04/18/1996, 04/16/2007   ,   Health Maintenance   Topic Date Due    Flu vaccine (1) 09/01/2020    DTaP/Tdap/Td vaccine (7 - Td) 04/28/2021    Hepatitis A vaccine  Completed    Hepatitis B vaccine  Completed    Hib vaccine  Completed    HPV vaccine  Completed    Varicella vaccine  Completed    Meningococcal (ACWY) vaccine  Completed    HIV screen  Completed    Pneumococcal 0-64 years Vaccine  Aged Out       PHYSICAL EXAMINATION:  [ INSTRUCTIONS:  \"[x]\" Indicates a positive item  \"[]\" Indicates a negative item  -- DELETE ALL ITEMS NOT EXAMINED]  Vital Signs: (As obtained by patient/caregiver or practitioner observation)    Blood pressure-  Heart rate-    Respiratory rate-    Temperature-  Pulse oximetry-     Constitutional: [x] Appears well-developed and well-nourished [x] No apparent distress      [] Abnormal-   Mental status  [x] Alert and awake  [] Oriented to person/place/time []Able to follow commands      Eyes:  EOM    [x]  Normal  [] Abnormal-  Sclera  [x]  Normal  [] Abnormal -         Discharge []  None visible  [] Abnormal -    HENT:   [x] Normocephalic, atraumatic. [] Abnormal   [] Mouth/Throat: Mucous membranes are moist.     External Ears [x] Normal  [] Abnormal-     Neck: [x] No visualized mass     Pulmonary/Chest: [x] Respiratory effort normal.  [] No visualized signs of difficulty breathing or respiratory distress        [] Abnormal-      Musculoskeletal:   [] Normal gait with no signs of ataxia         [x] Normal range of motion of neck        [] Abnormal-       Neurological:        [] No Facial Asymmetry (Cranial nerve 7 motor function) (limited exam to video visit)          [] No gaze palsy        [] Abnormal-  Facial asymmetry at baseline. Skin:        [x] No significant exanthematous lesions or discoloration noted on facial skin         [] Abnormal-            Psychiatric:       [x] Normal Affect [] No Hallucinations        [] Abnormal-     Other pertinent observable physical exam findings-     ASSESSMENT/PLAN:  1. Abnormality of gait and mobility      2. Anxiety  Follow up with psych. continue with klonopin and Prozac    3. Agenesis of corpus callosum ([address]      4. Myopia of both eyes with astigmatism      5. Prolactin increased ([facility] [address]  Follow up with endocrinology. Return in about 6 months (around 1/13/2021). Barney Velazquez is a 22 y.o. male being evaluated by a Virtual Visit (video visit) encounter to address concerns as mentioned above. A caregiver was present when appropriate. Due to this being a TeleHealth encounter (During [place]-81 public health emergency), evaluation of the following organ systems was limited: Vitals/Constitutional/EENT/Resp/CV/GI//MS/Neuro/Skin/Heme-Lymph-Imm.   Pursuant to the emergency declaration under the [facility], [address] [place] [address] authority and the [email or web address] and [employer] Act, this Virtual Visit was conducted with patient's (and/or legal guardian's) consent, to reduce the patient's risk of exposure to COVID-19 and provide necessary medical care. The patient (and/or legal guardian) has also been advised to contact this office for worsening conditions or problems, and seek emergency medical treatment and/or call 911 if deemed necessary. Patient identification was verified at the start of the visit: Yes    Total time spent on this encounter: 15 minutes    Services were provided through a video synchronous discussion virtually to substitute for in-person clinic visit. Patient and provider were located at their individual homes. --Curtis Peterson MD on 7/13/2020 at 6:46 PM    An electronic signature was used to authenticate this note.

## 2020-10-15 ENCOUNTER — OFFICE VISIT (OUTPATIENT)
Dept: ENDOCRINOLOGY | Age: 25
End: 2020-10-15
Payer: MEDICAID

## 2020-10-15 VITALS
SYSTOLIC BLOOD PRESSURE: 97 MMHG | DIASTOLIC BLOOD PRESSURE: 59 MMHG | HEIGHT: 67 IN | OXYGEN SATURATION: 94 % | HEART RATE: 90 BPM | BODY MASS INDEX: 19.93 KG/M2 | WEIGHT: 127 LBS

## 2020-10-15 DIAGNOSIS — F41.9 ANXIETY: ICD-10-CM

## 2020-10-15 DIAGNOSIS — E22.1 HYPERPROLACTINEMIA (HCC): ICD-10-CM

## 2020-10-15 LAB
ALBUMIN SERPL-MCNC: 4.7 G/DL (ref 3.5–4.6)
ALP BLD-CCNC: 103 U/L (ref 35–104)
ALT SERPL-CCNC: 24 U/L (ref 0–41)
ANISOCYTOSIS: ABNORMAL
AST SERPL-CCNC: 18 U/L (ref 0–40)
ATYPICAL LYMPHOCYTE RELATIVE PERCENT: 4 %
BANDED NEUTROPHILS RELATIVE PERCENT: 2 %
BASOPHILS ABSOLUTE: 0 K/UL (ref 0–0.2)
BASOPHILS RELATIVE PERCENT: 1 %
BILIRUB SERPL-MCNC: 0.6 MG/DL (ref 0.2–0.7)
BILIRUBIN DIRECT: <0.2 MG/DL (ref 0–0.4)
BILIRUBIN, INDIRECT: ABNORMAL MG/DL (ref 0–0.6)
CORTISOL TOTAL: 6.3 UG/DL
EOSINOPHILS ABSOLUTE: 0 K/UL (ref 0–0.7)
EOSINOPHILS RELATIVE PERCENT: 2.2 %
HCT VFR BLD CALC: 41.5 % (ref 42–52)
HEMOGLOBIN: 14.1 G/DL (ref 14–18)
LYMPHOCYTES ABSOLUTE: 1.4 K/UL (ref 1–4.8)
LYMPHOCYTES RELATIVE PERCENT: 33 %
MCH RBC QN AUTO: 30.8 PG (ref 27–31.3)
MCHC RBC AUTO-ENTMCNC: 34 % (ref 33–37)
MCV RBC AUTO: 90.5 FL (ref 80–100)
MICROCYTES: ABNORMAL
MONOCYTES ABSOLUTE: 0.4 K/UL (ref 0.2–0.8)
MONOCYTES RELATIVE PERCENT: 9.5 %
NEUTROPHILS ABSOLUTE: 2 K/UL (ref 1.4–6.5)
NEUTROPHILS RELATIVE PERCENT: 51 %
PDW BLD-RTO: 13 % (ref 11.5–14.5)
PLATELET # BLD: 201 K/UL (ref 130–400)
PLATELET SLIDE REVIEW: NORMAL
PROLACTIN: 6.5 NG/ML (ref 4–15.2)
RBC # BLD: 4.58 M/UL (ref 4.7–6.1)
SLIDE REVIEW: ABNORMAL
T4 FREE: 1.34 NG/DL (ref 0.84–1.68)
TOTAL PROTEIN: 7 G/DL (ref 6.3–8)
TSH SERPL DL<=0.05 MIU/L-ACNC: 1.65 UIU/ML (ref 0.44–3.86)
WBC # BLD: 3.8 K/UL (ref 4.8–10.8)

## 2020-10-15 PROCEDURE — 99203 OFFICE O/P NEW LOW 30 MIN: CPT | Performed by: INTERNAL MEDICINE

## 2020-10-15 RX ORDER — ACETAMINOPHEN 325 MG/1
650 TABLET ORAL EVERY 6 HOURS PRN
COMMUNITY

## 2020-10-15 NOTE — PROGRESS NOTES
Subjective:      Patient ID: Jasmine Juarez is a 22 y.o. male. Patient referred here for high prolactin level  Most history obtained to the caregiver who was accompanied patient today  Other   This is a new (High prolactin level) problem. The current episode started more than 1 month ago. The problem occurs rarely. The problem has been unchanged. Pertinent negatives include no anorexia, headaches or visual change. Nothing aggravates the symptoms. He has tried nothing for the symptoms.         Labs were reviewed from February 2020 CBC was normal  Testosterone was 608 from December 2019 prolactin was 34.4 FSH was 18.4 LH was 13.3    Patient Active Problem List   Diagnosis    Anxiety    Epilepsy without status epilepticus ([facility] [address]    Dry eyes    Agenesis of corpus callosum (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    Optic nerve hypoplasia, bilateral    Congenital nystagmus    Abnormality of gait and mobility    Stroke (cerebrum) (HCC)    Memory loss    Dysarthria    Multi infarct state     Social History     Socioeconomic History    Marital status: Single     Spouse name: Not on file    Number of children: Not on file    Years of education: Not on file    Highest education level: Not on file   Occupational History    Not on file   Social Needs    Financial resource strain: Not on file    Food insecurity     Worry: Not on file     Inability: Not on file    Transportation needs     Medical: Not on file     Non-medical: Not on file   Tobacco Use    Smoking status: Never Smoker    Smokeless tobacco: Never Used   Substance and Sexual Activity    Alcohol use: No     Alcohol/week: 0.0 standard drinks    Drug use: No    Sexual activity: Not on file   Lifestyle    Physical activity     Days per week: Not on file     Minutes per session: Not on file    Stress: Not on file   Relationships    Social connections     Talks on phone: Not on file     Gets together: Not on file     Attends Jew service: Not on file     Active member of club or organization: Not on file     Attends meetings of clubs or organizations: Not on file     Relationship status: Not on file    Intimate partner violence     Fear of current or ex partner: Not on file     Emotionally abused: Not on file     Physically abused: Not on file     Forced sexual activity: Not on file   Other Topics Concern    Not on file   Social History Narrative    Not on file     History reviewed. No pertinent family history. No Known Allergies      Current Outpatient Medications:     acetaminophen (TYLENOL) 325 MG tablet, Take 650 mg by mouth every 6 hours as needed for Pain, Disp: , Rfl:     Docusate Sodium (DULCOLAX STOOL SOFTENER PO), Take 1 Dose by mouth as needed, Disp: , Rfl:     Handicap Placard MISC, by Does not apply route Good from 3/4/20 - 3/4/25, Disp: 1 each, Rfl: 0    FLUoxetine (PROZAC) 40 MG capsule, Take 80 mg by mouth daily , Disp: 90 capsule, Rfl: 3    FLUoxetine (PROZAC) 10 MG capsule, Take 1 capsule by mouth daily, Disp: 30 capsule, Rfl: 3    Artificial Tear Solution (GENTEAL TEARS) 0.1-0.2-0.3 % SOLN, Apply 1 drop to eye 2 times daily, Disp: 1 Bottle, Rfl: 3    cetirizine (ZYRTEC ALLERGY) 10 MG tablet, Take 1 tablet by mouth daily, Disp: 30 tablet, Rfl: 5    Cholecalciferol (VITAMIN D) 2000 UNITS CAPS capsule, Take by mouth daily, Disp: , Rfl:     Multiple Vitamin (MULTI VITAMIN DAILY PO), Take by mouth daily, Disp: , Rfl:     lamoTRIgine (LAMICTAL) 100 MG tablet, Take 150 mg by mouth 2 times daily, Disp: , Rfl:     clonazePAM (KLONOPIN) 1 MG tablet, Take 1 tablet by mouth 3 times daily as needed for Anxiety for up to 90 days. , Disp: 90 tablet, Rfl: 2      Review of Systems   Unable to perform ROS: Mental status change   Gastrointestinal: Negative for anorexia. Neurological: Negative for headaches.        Vitals:    10/15/20 1106   BP: (!) 97/59   Pulse: 90   SpO2: 94% Weight: 127 lb (57.6 kg)   Height: 5' 7\" (1.702 m)       Objective:   Physical Exam  Constitutional:       Appearance: Normal appearance. He is normal weight. HENT:      Head: Normocephalic and atraumatic. Right Ear: External ear normal.      Left Ear: External ear normal.      Nose: Nose normal.      Mouth/Throat:      Mouth: Mucous membranes are moist.   Eyes:      Extraocular Movements: Extraocular movements intact. Conjunctiva/sclera: Conjunctivae normal.      Pupils: Pupils are equal, round, and reactive to light. Neck:      Musculoskeletal: Normal range of motion. Cardiovascular:      Rate and Rhythm: Normal rate and regular rhythm. Heart sounds: Normal heart sounds. Abdominal:      Palpations: Abdomen is soft. Musculoskeletal: Normal range of motion. Skin:     General: Skin is warm. Neurological:      General: No focal deficit present. Mental Status: He is alert. Psychiatric:         Mood and Affect: Mood normal.         Assessment:       Diagnosis Orders   1.  Hyperprolactinemia (HCC)  MRI BRAIN W CONTRAST    T4, Free    TSH without Reflex    Thyroid Peroxidase Antibody    Cortisol Total    Prolactin           Plan:      Orders Placed This Encounter   Procedures    MRI BRAIN W CONTRAST     Standing Status:   Future     Standing Expiration Date:   10/15/2021     Order Specific Question:   Reason for exam:     Answer:   mri with  pituitary cuts    T4, Free     Standing Status:   Future     Number of Occurrences:   1     Standing Expiration Date:   10/15/2021    TSH without Reflex     Standing Status:   Future     Number of Occurrences:   1     Standing Expiration Date:   10/15/2021    Thyroid Peroxidase Antibody     Standing Status:   Future     Number of Occurrences:   1     Standing Expiration Date:   10/15/2021    Cortisol Total     Standing Status:   Future     Number of Occurrences:   1     Standing Expiration Date:   10/15/2021     Order Specific Question:   Sawyer Silver or 4PM?     Answer:   random    Prolactin     Standing Status:   Future     Number of Occurrences:   1     Standing Expiration Date:   10/15/2021     Will repeat prolactin thyroid function test thyroid antibodies get a cortisol level and also MRI of the brain with contrast spoke to patient's caretaker who was accompanying the patient today about procedure and need for any anxiolytic medication if needed for MRI  Patient to follow-up in 4 weeks after the results of the lab and MRI  More than 50% of 30-minute spent patient's caretaker education counseling        Octavia Sewell MD

## 2020-10-17 LAB — THYROID PEROXIDASE (TPO) ABS: <10 IU/ML (ref 0–35)

## 2020-10-20 ASSESSMENT — ENCOUNTER SYMPTOMS: VISUAL CHANGE: 0

## 2020-11-05 ENCOUNTER — TELEPHONE (OUTPATIENT)
Dept: ENDOCRINOLOGY | Age: 25
End: 2020-11-05

## 2020-11-05 DIAGNOSIS — D35.2 HYPERPROLACTINOMA (HCC): Primary | ICD-10-CM

## 2020-11-05 NOTE — TELEPHONE ENCOUNTER
Radiology is calling saying they need a new order for MRI of the brain of the with  contrast and IAC portion so they can view the pituitary glands.

## 2020-11-12 ENCOUNTER — VIRTUAL VISIT (OUTPATIENT)
Dept: ENDOCRINOLOGY | Age: 25
End: 2020-11-12
Payer: MEDICAID

## 2020-11-12 PROCEDURE — 99212 OFFICE O/P EST SF 10 MIN: CPT | Performed by: INTERNAL MEDICINE

## 2020-11-12 NOTE — PROGRESS NOTES
THYROID PEROXIDASE (TPO) ABS Latest Ref Range: 0.0 - 35.0 IU/mL   <10.0    T4 Free Latest Ref Range: 0.84 - 1.68 ng/dL   1.34        Review of Systems   Unable to perform ROS: Mental status change       Prior to Visit Medications    Medication Sig Taking? Authorizing Provider   acetaminophen (TYLENOL) 325 MG tablet Take 650 mg by mouth every 6 hours as needed for Pain  Historical Provider, MD   Docusate Sodium (DULCOLAX STOOL SOFTENER PO) Take 1 Dose by mouth as needed  Historical Provider, MD   Handicap Placard MISC by Does not apply route Good from 3/4/20 - 3/4/25  Michelle Sierra MD   clonazePAM (KLONOPIN) 1 MG tablet Take 1 tablet by mouth 3 times daily as needed for Anxiety for up to 90 days. Jessica Garcia MD   FLUoxetine (PROZAC) 40 MG capsule Take 80 mg by mouth daily   Michelle Sierra MD   FLUoxetine (PROZAC) 10 MG capsule Take 1 capsule by mouth daily  Louisa Daly MD   Artificial Tear Solution (GENTEAL TEARS) 0.1-0.2-0.3 % SOLN Apply 1 drop to eye 2 times daily  Lopez Gray DO   cetirizine (ZYRTEC ALLERGY) 10 MG tablet Take 1 tablet by mouth daily  Lopez Gray DO   Cholecalciferol (VITAMIN D) 2000 UNITS CAPS capsule Take by mouth daily  Historical Provider, MD   Multiple Vitamin (MULTI VITAMIN DAILY PO) Take by mouth daily  Historical Provider, MD   lamoTRIgine (LAMICTAL) 100 MG tablet Take 150 mg by mouth 2 times daily  Historical Provider, MD       Social History     Tobacco Use    Smoking status: Never Smoker    Smokeless tobacco: Never Used   Substance Use Topics    Alcohol use: No     Alcohol/week: 0.0 standard drinks    Drug use:  No            PHYSICAL EXAMINATION:  [ INSTRUCTIONS:  \"[x]\" Indicates a positive item  \"[]\" Indicates a negative item  -- DELETE ALL ITEMS NOT EXAMINED]  [] Alert  [] Oriented to person/place/time    [] No apparent distress  [] Toxic appearing    [] Face flushed appearing [] Sclera clear  [] Lips are cyanotic      [] Breathing appears normal  [] Appears tachypneic      [] Rash on visible skin    [] Cranial Nerves II-XII grossly intact    [] Motor grossly intact in visible upper extremities    [] Motor grossly intact in visible lower extremities    [] Normal Mood  [] Anxious appearing    [] Depressed appearing  [] Confused appearing      [] Poor short term memory  [] Poor long term memory    [] OTHER:      Due to this being a TeleHealth encounter, evaluation of the following organ systems is limited: Vitals/Constitutional/EENT/Resp/CV/GI//MS/Neuro/Skin/Heme-Lymph-Imm. ASSESSMENT/PLAN:   Diagnosis Orders   1. Hyperprolactinemia ([facility] [address]       Follow-up in the MRI made    Patient to follow-up only as needed      An  electronic signature was used to authenticate this note. --Angela Valenzuela MD on 11/12/2020 at 2:38 PM        Pursuant to the emergency declaration under the [facility], [facility] waiver authority and the [employer] Act, this Virtual  Visit was conducted, with patient's consent, to reduce the patient's risk of exposure to COVID-19 and provide continuity of care for an established patient. Services were provided through a video synchronous discussion virtually to substitute for in-person clinic visit.

## 2021-01-06 ENCOUNTER — VIRTUAL VISIT (OUTPATIENT)
Dept: FAMILY MEDICINE CLINIC | Age: 26
End: 2021-01-06
Payer: MEDICAID

## 2021-01-06 DIAGNOSIS — J30.9 ALLERGIC RHINITIS, UNSPECIFIED SEASONALITY, UNSPECIFIED TRIGGER: Primary | ICD-10-CM

## 2021-01-06 PROCEDURE — 99213 OFFICE O/P EST LOW 20 MIN: CPT | Performed by: FAMILY MEDICINE

## 2021-01-06 RX ORDER — FLUTICASONE PROPIONATE 50 MCG
1 SPRAY, SUSPENSION (ML) NASAL DAILY
Qty: 3 BOTTLE | Refills: 1 | Status: SHIPPED | OUTPATIENT
Start: 2021-01-06

## 2021-01-06 ASSESSMENT — ENCOUNTER SYMPTOMS
BACK PAIN: 0
DIARRHEA: 0
NAUSEA: 0
ABDOMINAL PAIN: 0
SORE THROAT: 1
CHOKING: 0
ABDOMINAL DISTENTION: 0
COUGH: 0

## 2021-01-06 ASSESSMENT — PATIENT HEALTH QUESTIONNAIRE - PHQ9: DEPRESSION UNABLE TO ASSESS: FUNCTIONAL CAPACITY MOTIVATION LIMITS ACCURACY

## 2021-01-06 NOTE — PROGRESS NOTES
2021    TELEHEALTH EVALUATION -- Audio/Visual (During UYFAR-96 public health emergency)    HPI:    Jocelyn Martínez (:  1995) has requested an audio/video evaluation for the following concern(s):    Sick with stuffy nose, scratchy throat x1 week  Associated with itchy eyes and nose   Happens around this time of year. Associated with allergies. On a antihistamine which helps some but would like a medication that can help with nasal congestion. No one in the group home has similar symptoms   Has been taking honey for his throat which helps. Symptoms are improving. Denies any lost of taste or semll. Denies any diarrhea. Review of Systems   Constitutional: Negative for activity change, appetite change and fatigue. HENT: Positive for sneezing and sore throat. Respiratory: Negative for cough and choking. Cardiovascular: Negative for chest pain and leg swelling. Gastrointestinal: Negative for abdominal distention, abdominal pain, diarrhea and nausea. Genitourinary: Negative for difficulty urinating and dysuria. Musculoskeletal: Negative for arthralgias and back pain. Neurological: Negative for headaches. Hematological: Negative for adenopathy. Does not bruise/bleed easily. Psychiatric/Behavioral: Negative for agitation, behavioral problems and hallucinations. The patient is not nervous/anxious. Prior to Visit Medications    Medication Sig Taking?  Authorizing Provider   fluticasone (FLONASE) 50 MCG/ACT nasal spray 1 spray by Each Nostril route daily Yes Maryam Echavarria MD   acetaminophen (TYLENOL) 325 MG tablet Take 650 mg by mouth every 6 hours as needed for Pain  Historical Provider, MD   Docusate Sodium (DULCOLAX STOOL SOFTENER PO) Take 1 Dose by mouth as needed  Historical Provider, MD   Handicap Placard MISC by Does not apply route Good from 3/4/20 - 3/4/25  Maryam Echavarria MD   clonazePAM (KLONOPIN) 1 MG tablet Take 1 tablet by mouth 3 times daily as needed for Anxiety for up to 90 days.   Davis Jurado MD   FLUoxetine (PROZAC) 40 MG capsule Take 80 mg by mouth daily   Louisa Daly MD   FLUoxetine (PROZAC) 10 MG capsule Take 1 capsule by mouth daily  Louisa Daly MD   Artificial Tear Solution (GENTEAL TEARS) 0.1-0.2-0.3 % SOLN Apply 1 drop to eye 2 times daily  Mitchell Alfaro DO   cetirizine (ZYRTEC ALLERGY) 10 MG tablet Take 1 tablet by mouth daily  Mitchell Alfaro DO   Cholecalciferol (VITAMIN D) 2000 UNITS CAPS capsule Take by mouth daily  Historical Provider, MD   Multiple Vitamin (MULTI VITAMIN DAILY PO) Take by mouth daily  Historical Provider, MD   lamoTRIgine (LAMICTAL) 100 MG tablet Take 150 mg by mouth 2 times daily  Historical Provider, MD       Social History     Tobacco Use    Smoking status: Never Smoker    Smokeless tobacco: Never Used   Substance Use Topics    Alcohol use: No     Alcohol/week: 0.0 standard drinks    Drug use: No        No Known Allergies,   Past Medical History:   Diagnosis Date    ADHD (attention deficit hyperactivity disorder)     Anxiety     Epilepsy ([facility] [address]     Myopia     Nystagmus     Optic nerve hypoplasia    , No past surgical history on file.,   Social History     Tobacco Use    Smoking status: Never Smoker    Smokeless tobacco: Never Used   Substance Use Topics    Alcohol use: No     Alcohol/week: 0.0 standard drinks    Drug use: No   , No family history on file.,   Immunization History   Administered Date(s) Administered    DTP 1995    DTP/HiB 1995, 01/16/1996    DTaP (Infanrix) 10/04/1996    HIB PRP-T (ActHIB, Hiberix) 1995, 07/05/1996    HPV Quadrivalent (Gardasil) 05/11/2012, 07/12/2012, 11/14/2012    Hepatitis A Vaccine 04/17/2009, 04/19/2010    Hepatitis B (Recombivax HB) 08/01/2017    Hepatitis B Ped/Adol (Engerix-B, Recombivax HB) 1995, 1995, 01/16/1996    Influenza Nasal 10/19/2011    Influenza Virus Vaccine 10/01/2013, 11/19/2018    Influenza, MDCK Quadv, IM, PF (Flucelvax 4 yrs and older) 10/25/2019    Influenza, Eugenia [place], IM, (6 mo and older Fluzone, Flulaval, Fluarix and 3 yrs and older Afluria) 11/14/2012, 10/27/2016, 10/27/2016, 09/19/2017, 11/19/2018    Influenza, Eugenia [place], IM, PF (6 mo and older Fluzone, Flulaval, Fluarix, and 3 yrs and older Afluria) 09/21/2015, 09/19/2017, 11/02/2018    MMR 04/18/1996, 11/13/2006    Meningococcal MCV4O (Menveo) 04/28/2011    Meningococcal MCV4P (Menactra) 04/07/2006    Polio IPV (IPOL) 1995, 1995, 10/04/1996, 10/04/1998, 04/19/2010    Tdap (Boostrix, Adacel) 04/07/2006, 04/28/2011    Varicella (Varivax) 04/18/1996, 04/16/2007   ,   Health Maintenance   Topic Date Due    Hepatitis C screen  1995    DTaP/Tdap/Td vaccine (4 - Td) 04/28/2021    Hepatitis A vaccine  Completed    Hepatitis B vaccine  Completed    Hib vaccine  Completed    HPV vaccine  Completed    Varicella vaccine  Completed    Meningococcal (ACWY) vaccine  Completed    Flu vaccine  Completed    HIV screen  Completed    Pneumococcal 0-64 years Vaccine  Aged Out       PHYSICAL EXAMINATION:  [ INSTRUCTIONS:  \"[x]\" Indicates a positive item  \"[]\" Indicates a negative item  -- DELETE ALL ITEMS NOT EXAMINED]  Vital Signs: (As obtained by patient/caregiver or practitioner observation)    Blood pressure-  Heart rate-    Respiratory rate-    Temperature-  Pulse oximetry-     Constitutional: [x] Appears well-developed and well-nourished [] No apparent distress      [] Abnormal-   Mental status  [x] Alert and awake  [] Oriented to person/place/time []Able to follow commands      Eyes:  EOM    [x]  Normal  [] Abnormal-  Sclera  [x]  Normal  [] Abnormal -         Discharge []  None visible  [] Abnormal -    HENT:   [x] Normocephalic, atraumatic.   [] Abnormal   [] Mouth/Throat: Mucous membranes are moist.     External Ears [] Normal  [] Abnormal-     Neck: [] No visualized mass     Pulmonary/Chest: [x] Respiratory effort normal.  [] No visualized signs of difficulty breathing or respiratory distress        [] Abnormal-      Musculoskeletal:   [x] Normal gait with no signs of ataxia         [] Normal range of motion of neck        [] Abnormal-       Neurological:        [x] No Facial Asymmetry (Cranial nerve 7 motor function) (limited exam to video visit)          [] No gaze palsy        [] Abnormal-         Skin:        [x] No significant exanthematous lesions or discoloration noted on facial skin         [] Abnormal-            Psychiatric:       [x] Normal Affect [] No Hallucinations        [] Abnormal-     Other pertinent observable physical exam findings-     Lab Results   Component Value Date     10/03/2019    K 3.8 10/03/2019     10/03/2019    CO2 26 10/03/2019    BUN 11 10/03/2019    CREATININE 0.57 (L) 10/03/2019    GLUCOSE 98 10/03/2019    CALCIUM 9.2 10/03/2019    PROT 7.0 10/15/2020    LABALBU 4.7 (H) 10/15/2020    BILITOT 0.6 10/15/2020    ALKPHOS 103 10/15/2020    AST 18 10/15/2020    ALT 24 10/15/2020    LABGLOM >60.0 10/03/2019    GFRAA >60.0 10/03/2019    GLOB 2.2 (L) 10/03/2019       Lab Results   Component Value Date    WBC 3.8 (L) 10/15/2020    HGB 14.1 10/15/2020    HCT 41.5 (L) 10/15/2020    MCV 90.5 10/15/2020     10/15/2020     No results found for: LABA1C  No results found for: EAG      Reviewed past notes and history. ASSESSMENT/PLAN:  There are no diagnoses linked to this encounter. Return in about 6 months (around 7/6/2021), or if symptoms worsen or fail to improve. Faina Ballesteros is a 22 y.o. male being evaluated by a Virtual Visit (video visit) encounter to address concerns as mentioned above. A caregiver was present when appropriate. Due to this being a TeleHealth encounter (During [facility] public health emergency), evaluation of the following organ systems was limited: Vitals/Constitutional/EENT/Resp/CV/GI//MS/Neuro/Skin/Heme-Lymph-Imm.   Pursuant to the emergency declaration under the [facility], [address] [place] [address] authority and the [employer] and [employer] Act, this Virtual Visit was conducted with patient's (and/or legal guardian's) consent, to reduce the patient's risk of exposure to COVID-19 and provide necessary medical care. The patient (and/or legal guardian) has also been advised to contact this office for worsening conditions or problems, and seek emergency medical treatment and/or call 911 if deemed necessary. Patient identification was verified at the start of the visit: Yes    Total time spent on this encounter: 15 minutes    Services were provided through a video synchronous discussion virtually to substitute for in-person clinic visit. Patient and provider were located at their individual homes. --Lita Landeros MD on 1/6/2021 at 8:21 PM    An electronic signature was used to authenticate this note.

## 2021-01-28 ENCOUNTER — OFFICE VISIT (OUTPATIENT)
Dept: FAMILY MEDICINE CLINIC | Age: 26
End: 2021-01-28
Payer: MEDICAID

## 2021-01-28 VITALS
SYSTOLIC BLOOD PRESSURE: 110 MMHG | DIASTOLIC BLOOD PRESSURE: 66 MMHG | WEIGHT: 130 LBS | BODY MASS INDEX: 20.4 KG/M2 | TEMPERATURE: 97.9 F | HEIGHT: 67 IN | HEART RATE: 105 BPM | OXYGEN SATURATION: 98 %

## 2021-01-28 DIAGNOSIS — Q04.0 AGENESIS OF CORPUS CALLOSUM (HCC): Primary | ICD-10-CM

## 2021-01-28 DIAGNOSIS — R26.9 ABNORMALITY OF GAIT AND MOBILITY: ICD-10-CM

## 2021-01-28 DIAGNOSIS — R79.89 PROLACTIN INCREASED: ICD-10-CM

## 2021-01-28 DIAGNOSIS — F41.9 ANXIETY: ICD-10-CM

## 2021-01-28 DIAGNOSIS — Z11.59 NEED FOR HEPATITIS C SCREENING TEST: ICD-10-CM

## 2021-01-28 DIAGNOSIS — G40.909 NONINTRACTABLE EPILEPSY WITHOUT STATUS EPILEPTICUS, UNSPECIFIED EPILEPSY TYPE (HCC): ICD-10-CM

## 2021-01-28 DIAGNOSIS — F79 INTELLECTUAL DISABILITY: ICD-10-CM

## 2021-01-28 PROCEDURE — 99214 OFFICE O/P EST MOD 30 MIN: CPT | Performed by: FAMILY MEDICINE

## 2021-01-28 RX ORDER — CLONAZEPAM 1 MG/1
1 TABLET ORAL 2 TIMES DAILY PRN
Qty: 90 TABLET | Refills: 2 | Status: SHIPPED | COMMUNITY
Start: 2021-01-28

## 2021-01-28 RX ORDER — LAMOTRIGINE 150 MG/1
150 TABLET ORAL 2 TIMES DAILY
COMMUNITY

## 2021-01-28 RX ORDER — ACETYLCYSTEINE 600 MG
CAPSULE ORAL
COMMUNITY

## 2021-01-28 RX ORDER — ACETYLCYSTEINE 600 MG
CAPSULE ORAL
COMMUNITY
End: 2021-07-28 | Stop reason: CLARIF

## 2021-01-28 SDOH — ECONOMIC STABILITY: TRANSPORTATION INSECURITY
IN THE PAST 12 MONTHS, HAS THE LACK OF TRANSPORTATION KEPT YOU FROM MEDICAL APPOINTMENTS OR FROM GETTING MEDICATIONS?: NO

## 2021-01-28 SDOH — ECONOMIC STABILITY: FOOD INSECURITY: WITHIN THE PAST 12 MONTHS, THE FOOD YOU BOUGHT JUST DIDN'T LAST AND YOU DIDN'T HAVE MONEY TO GET MORE.: NEVER TRUE

## 2021-01-28 SDOH — ECONOMIC STABILITY: INCOME INSECURITY: HOW HARD IS IT FOR YOU TO PAY FOR THE VERY BASICS LIKE FOOD, HOUSING, MEDICAL CARE, AND HEATING?: NOT HARD AT ALL

## 2021-01-28 SDOH — ECONOMIC STABILITY: TRANSPORTATION INSECURITY
IN THE PAST 12 MONTHS, HAS LACK OF TRANSPORTATION KEPT YOU FROM MEETINGS, WORK, OR FROM GETTING THINGS NEEDED FOR DAILY LIVING?: NO

## 2021-01-28 ASSESSMENT — PATIENT HEALTH QUESTIONNAIRE - PHQ9
1. LITTLE INTEREST OR PLEASURE IN DOING THINGS: 0
2. FEELING DOWN, DEPRESSED OR HOPELESS: 1

## 2021-01-28 NOTE — PROGRESS NOTES
Chief Complaint   Patient presents with    Annual Exam     Has no concerns at this time. HPI: Ingrid Cortes 22 y.o. male presenting for       Patient is here for his annual exam.  No issues or concerns at this time. Anxiety/ADHD  Per group home, patient has been experiencing more agitation in the morning. Psychiatry has him on his Klonopin twice a day but is unsure if it could be changed to 3 times a day. As you may recall, patient has a history of anxiety, ADHD. This is managed by a psychiatrist.  Patient was recently taken off of Risperdal after series of labs that were obtained showed that he has an elevated prolactin level. Patient was referred to endocrinology for further evaluation. Per patient, he has noticed milk expression from the breast.  Denies any recent change in his vision. Patient denies any fevers, chills, nausea, vomiting, chest pain, shortness of breath, abdominal pain, change in nation, change in stools. Patient has not been able to establish care with endocrinologist due to the coronavirus pandemic. Posture and gait instability  Stable at this time. When he walks he trips. Has been going for 5 years. History of hyperprolactinemia  Patient was following endocrinology. Repeat prolactin levels were normal.  As result his MRI was canceled and was to continue to monitor at this time. Review of Systems   Constitutional: Negative for activity change, appetite change, diaphoresis and fatigue. HENT: Negative for congestion, dental problem, facial swelling, mouth sores, sinus pain and sneezing. Eyes: Negative for photophobia, discharge, redness and itching. Respiratory: Negative for apnea, cough, chest tightness, shortness of breath and stridor. Cardiovascular: Negative for chest pain, palpitations and leg swelling. Gastrointestinal: Negative for abdominal distention, anal bleeding, blood in stool and constipation.    Endocrine: Negative for cold intolerance, heat intolerance and polyphagia. Genitourinary: Negative for difficulty urinating, discharge, dysuria, frequency, genital sores, penile swelling and testicular pain. Musculoskeletal: Positive for gait problem. Negative for arthralgias, back pain and myalgias. Skin: Negative for color change, pallor, rash and wound. Allergic/Immunologic: Negative for environmental allergies and food allergies. Neurological: Negative for dizziness, syncope, facial asymmetry, weakness and numbness. Psychiatric/Behavioral: Negative for agitation, dysphoric mood and hallucinations. The patient is not hyperactive. Current Outpatient Medications   Medication Sig Dispense Refill    lamoTRIgine (LAMICTAL) 150 MG tablet Take 150 mg by mouth 2 times daily      Acetylcysteine (NAC) 600 MG CAPS Take by mouth 1 PO QHS      Acetylcysteine (NAC) 600 MG CAPS Take by mouth 2 PO QAM      clonazePAM (KLONOPIN) 1 MG tablet Take 1 tablet by mouth 2 times daily as needed. 90 tablet 2    fluticasone (FLONASE) 50 MCG/ACT nasal spray 1 spray by Each Nostril route daily 3 Bottle 1    acetaminophen (TYLENOL) 325 MG tablet Take 650 mg by mouth every 6 hours as needed for Pain      Docusate Sodium (DULCOLAX STOOL SOFTENER PO) Take 1 Dose by mouth as needed      Handicap Placard MISC by Does not apply route Good from 3/4/20 - 3/4/25 1 each 0    FLUoxetine (PROZAC) 40 MG capsule Take 80 mg by mouth daily  90 capsule 3    FLUoxetine (PROZAC) 10 MG capsule Take 1 capsule by mouth daily 30 capsule 3    Artificial Tear Solution (GENTEAL TEARS) 0.1-0.2-0.3 % SOLN Apply 1 drop to eye 2 times daily 1 Bottle 3    cetirizine (ZYRTEC ALLERGY) 10 MG tablet Take 1 tablet by mouth daily 30 tablet 5    Cholecalciferol (VITAMIN D) 2000 UNITS CAPS capsule Take by mouth daily      Multiple Vitamin (MULTI VITAMIN DAILY PO) Take by mouth daily       No current facility-administered medications for this visit. ROS]  CONSTITUTIONAL: The patient denies fevers, chills, sweats and body ache. HEENT: Denies headache, blurry vision, eye pain, tinnitus, vertigo,  sore throat, neck or thyroid masses. RESPIRATORY: Denies cough, sputum, hemoptysis. CARDIAC: Denies chest pain, pressure, palpitations, Denies lower extremity edema. GASTROINTESTINAL: Denies abdominal pain, constipation, diarrhea, bleeding in the stools,   GENITOURINARY: Denies dysuria, hematuria, nocturia or frequency, urinary incontinence. NEUROLOGIC: Denies headaches, dizziness, syncope, weakness  MUSCULOSKELETAL: Curvature of the spine noted. ENDOCRINOLOGY: Denies heat or cold intolerance. HEMATOLOGY: Denies easy bleeding or blood transfusion,anemia  DERMATOLOGY: Denies changes in moles or pigmentation changes. PSYCHIATRY: From noticed more agitation. Past Medical History:   Diagnosis Date    ADHD (attention deficit hyperactivity disorder)     Anxiety     Epilepsy ([facility] [address]     Myopia     Nystagmus     Optic nerve hypoplasia         History reviewed. No pertinent surgical history. History reviewed. No pertinent family history.      Social History     Socioeconomic History    Marital status: Single     Spouse name: Not on file    Number of children: Not on file    Years of education: Not on file    Highest education level: Not on file   Occupational History    Not on file   Social Needs    Financial resource strain: Not hard at all    Food insecurity     Worry: Never true     Inability: Never true   [employer] needs     Medical: No     Non-medical: No   Tobacco Use    Smoking status: Never Smoker    Smokeless tobacco: Never Used   Substance and Sexual Activity    Alcohol use: No     Alcohol/week: 0.0 standard drinks    Drug use: No    Sexual activity: Not on file   Lifestyle    Physical activity     Days per week: Not on file     Minutes per session: Not on file    Stress: Not on file   Relationships    Social connections Talks on phone: Not on file     Gets together: Not on file     Attends Yazidi service: Not on file     Active member of club or organization: Not on file     Attends meetings of clubs or organizations: Not on file     Relationship status: Not on file    Intimate partner violence     Fear of current or ex partner: Not on file     Emotionally abused: Not on file     Physically abused: Not on file     Forced sexual activity: Not on file   Other Topics Concern    Not on file   Social History Narrative    Not on file        /66 (Site: Right Upper Arm, Position: Sitting, Cuff Size: Medium Adult)   Pulse 105   Temp 97.9 °F (36.6 °C) (Oral)   Ht 5' 7\" (1.702 m)   Wt 130 lb (59 kg)   SpO2 98%   BMI 20.36 kg/m²        Physical Exam:    General appearance - alert, well appearing, and in no distress  Mental Status - alert, oriented to person, place  Eyes - pupils equal and reactive  Ears - bilateral TM's and external ear canals normal   Nose - normal and patent, no erythema, discharge or polyps   Sinuses - Normal paranasal sinuses without tenderness   Throat - mucous membranes moist, pharynx normal without lesions   Neck - supple, no significant adenopathy   Thyroid - thyroid is normal in size without nodules or tenderness    Chest - clear to auscultation, no wheezes, rales or rhonchi, symmetric air entry   Heart - normal rate, regular rhythm, normal S1, S2, no murmurs, rubs, clicks or gallops  Abdomen - soft, nontender, nondistended, no masses or organomegaly   Back exam - full range of motion, no tenderness, palpable spasm or pain on motion   Neurological -delayed speech. Slightly abnormal gait.   Musculoskeletal -curvature of the spine noted.,  Gait impairment  Extremities - peripheral pulses normal, no pedal edema, no clubbing or cyanosis   Skin - normal coloration and turgor, no rashes, no suspicious skin lesions noted    Labs   TSH   Date Value Ref Range Status   06/01/2018 2.060 0.270 - 4.200 uIU/mL Final   01/19/2018 2.770 0.270 - 4.200 uIU/mL Final   03/08/2016 2.900 0.270 - 4.200 uIU/mL Final     TSH   Date Value Ref Range Status   10/15/2020 1.650 0.440 - 3.860 uIU/mL Final   10/03/2019 2.280 0.440 - 3.860 uIU/mL Final   02/14/2019 3.200 0.440 - 3.860 uIU/mL Final     Comment:     Effective:  2/7/2019  New reference range for this analyte has been established.     01/17/2019 2.570 0.270 - 4.200 uIU/mL Final   04/20/2018 2.200 0.270 - 4.200 uIU/mL Final   06/02/2017 2.070 0.270 - 4.200 uIU/mL Final   12/27/2016 5.620 uIU/mL      Lab Results   Component Value Date     10/03/2019    K 3.8 10/03/2019     10/03/2019    CO2 26 10/03/2019    BUN 11 10/03/2019    CREATININE 0.57 (L) 10/03/2019    GLUCOSE 98 10/03/2019    CALCIUM 9.2 10/03/2019    PROT 7.0 10/15/2020    LABALBU 4.7 (H) 10/15/2020    BILITOT 0.6 10/15/2020    ALKPHOS 103 10/15/2020    AST 18 10/15/2020    ALT 24 10/15/2020    LABGLOM >60.0 10/03/2019    GFRAA >60.0 10/03/2019    GLOB 2.2 (L) 10/03/2019       Lab Results   Component Value Date    WBC 3.8 (L) 10/15/2020    HGB 14.1 10/15/2020    HCT 41.5 (L) 10/15/2020    MCV 90.5 10/15/2020     10/15/2020     No results found for: LABA1C  No results found for: EAG    Reviewed the notes from the endocrinologist and reviewed it with the group home and patient. A/P: Janice Melendez 22 y.o. male presenting for    1. Anxiety  Group home reports increased agitation. Patient is managed by psychiatry who controls his controlled substance. Patient may benefit from Klonopin 3 times a day but will defer to his specialist.  No signs of depression or anxiety worsening at this time. - clonazePAM (KLONOPIN) 1 MG tablet; Take 1 tablet by mouth 2 times daily as needed. Dispense: 90 tablet; Refill: 2    2. Need for hepatitis C screening test    - Hepatitis C Antibody; Future    3.  Nonintractable epilepsy without status epilepticus, unspecified [facility]  Stable no issues or concerns at this time. 4. Agenesis of corpus callosum ([address]      5. Abnormality of gait and mobility      6. Prolactin increased  Lactate levels are normal.  Therefore MRI was canceled at this time. We will continue to monitor. 7. Intellectual disability      8. Lives in group home            Patient can follow-up in 6 months for routine labs as well as health issues. Please note, this report has been partially produced using speech recognition software  and may cause  and /or contain errors related to that system including grammar, punctuation and spelling as well as words and phrases that may seem inappropriate. If there are questions or concerns please feel free to contact me to clarify.

## 2021-01-29 LAB — HEPATITIS C ANTIBODY INTERPRETATION: NORMAL

## 2021-02-17 PROBLEM — F90.9 ATTENTION DEFICIT HYPERACTIVITY DISORDER: Status: ACTIVE | Noted: 2021-02-17

## 2021-02-17 PROBLEM — R13.10 DYSPHAGIA: Status: ACTIVE | Noted: 2021-02-17

## 2021-02-17 PROBLEM — F71 MODERATE INTELLECTUAL DISABILITY: Status: ACTIVE | Noted: 2021-02-17

## 2021-02-17 PROBLEM — R79.89 INCREASED PROLACTIN LEVEL: Status: ACTIVE | Noted: 2021-02-17

## 2021-02-17 PROBLEM — E23.7 DISORDER OF PITUITARY GLAND (HCC): Status: ACTIVE | Noted: 2021-02-17

## 2021-02-17 PROBLEM — Z72.89 SELF-INJURIOUS BEHAVIOR: Status: ACTIVE | Noted: 2021-02-17

## 2021-02-22 ENCOUNTER — OFFICE VISIT (OUTPATIENT)
Dept: NEUROLOGY | Age: 26
End: 2021-02-22
Payer: MEDICAID

## 2021-02-22 VITALS
TEMPERATURE: 98.6 F | WEIGHT: 137.1 LBS | HEART RATE: 96 BPM | BODY MASS INDEX: 21.47 KG/M2 | SYSTOLIC BLOOD PRESSURE: 110 MMHG | DIASTOLIC BLOOD PRESSURE: 68 MMHG

## 2021-02-22 DIAGNOSIS — Q04.0 AGENESIS OF CORPUS CALLOSUM (HCC): ICD-10-CM

## 2021-02-22 DIAGNOSIS — I69.30 MULTI INFARCT STATE: ICD-10-CM

## 2021-02-22 DIAGNOSIS — H47.033 OPTIC NERVE HYPOPLASIA, BILATERAL: ICD-10-CM

## 2021-02-22 DIAGNOSIS — G40.909 NONINTRACTABLE EPILEPSY WITHOUT STATUS EPILEPTICUS, UNSPECIFIED EPILEPSY TYPE (HCC): Primary | ICD-10-CM

## 2021-02-22 PROCEDURE — 99213 OFFICE O/P EST LOW 20 MIN: CPT | Performed by: PSYCHIATRY & NEUROLOGY

## 2021-02-22 RX ORDER — GUAIFENESIN AND DEXTROMETHORPHAN HYDROBROMIDE 100; 10 MG/5ML; MG/5ML
5 SOLUTION ORAL EVERY 4 HOURS PRN
COMMUNITY

## 2021-02-22 ASSESSMENT — ENCOUNTER SYMPTOMS
CHOKING: 0
BACK PAIN: 0
SHORTNESS OF BREATH: 0
VOMITING: 0
PHOTOPHOBIA: 0
TROUBLE SWALLOWING: 0
NAUSEA: 0
COLOR CHANGE: 0

## 2021-02-22 NOTE — PROGRESS NOTES
Subjective:      Patient ID: Janice Melendez is a 22 y.o. male who presents today for:  Chief Complaint   Patient presents with    Follow-up     Patient has not had any recent seizures. Group home does not have any concerns at this time. 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 Patient is on Lamictal 150 mg twice a day doing well he is not any seizures. He has gait ataxia from cerebral palsy appears minor tremors from Risperdal.  Patient also is on clonazepam patient continues to do well he reports he has had seizures of the caregivers tell me he has not had any seizures. Is not any falls gained some weight    Past Medical History:   Diagnosis Date    ADHD (attention deficit hyperactivity disorder)     Anxiety     Epilepsy ([facility] [address]     Myopia     Nystagmus     Optic nerve hypoplasia      No past surgical history on file.   Social History     Socioeconomic History    Marital status: Single     Spouse name: Not on file    Number of children: Not on file    Years of education: Not on file    Highest education level: Not on file   Occupational History    Not on file   Social Needs    Financial resource strain: Not hard at all    Food insecurity     Worry: Never true     Inability: Never true   [employer] needs     Medical: No     Non-medical: No   Tobacco Use    Smoking status: Never Smoker    Smokeless tobacco: Never Used   Substance and Sexual Activity    Alcohol use: No     Alcohol/week: 0.0 standard drinks    Drug use: No    Sexual activity: Not on file   Lifestyle    Physical activity     Days per week: Not on file     Minutes per session: Not on file    Stress: Not on file   Relationships    Social connections     Talks on phone: Not on file     Gets together: Not on file     Attends Roman Catholic service: Not on file     Active member of club or organization: Not on file     Attends meetings of clubs or organizations: Not on file     Relationship status: Not on file    Intimate partner violence     Fear of current or ex partner: Not on file     Emotionally abused: Not on file     Physically abused: Not on file     Forced sexual activity: Not on file   Other Topics Concern    Not on file   Social History Narrative    Not on file     No family history on file. No Known Allergies    Current Outpatient Medications   Medication Sig Dispense Refill    Probiotic Product (PROBIOTIC-10 PO) Take by mouth      Dextromethorphan-guaiFENesin  MG/5ML SYRP Take 5 mLs by mouth every 4 hours as needed for Cough      lamoTRIgine (LAMICTAL) 150 MG tablet Take 150 mg by mouth 2 times daily      Acetylcysteine (NAC) 600 MG CAPS Take by mouth 1 PO QHS      Acetylcysteine (NAC) 600 MG CAPS Take by mouth 2 PO QAM      clonazePAM (KLONOPIN) 1 MG tablet Take 1 tablet by mouth 2 times daily as needed. 90 tablet 2    fluticasone (FLONASE) 50 MCG/ACT nasal spray 1 spray by Each Nostril route daily 3 Bottle 1    acetaminophen (TYLENOL) 325 MG tablet Take 650 mg by mouth every 6 hours as needed for Pain      Docusate Sodium (DULCOLAX STOOL SOFTENER PO) Take 1 Dose by mouth as needed      Handicap Placard MISC by Does not apply route Good from 3/4/20 - 3/4/25 1 each 0    FLUoxetine (PROZAC) 40 MG capsule Take 40 mg by mouth daily  90 capsule 3    FLUoxetine (PROZAC) 10 MG capsule Take 1 capsule by mouth daily 30 capsule 3    Artificial Tear Solution (GENTEAL TEARS) 0.1-0.2-0.3 % SOLN Apply 1 drop to eye 2 times daily 1 Bottle 3    cetirizine (ZYRTEC ALLERGY) 10 MG tablet Take 1 tablet by mouth daily 30 tablet 5    Cholecalciferol (VITAMIN D) 2000 UNITS CAPS capsule Take by mouth daily      Multiple Vitamin (MULTI VITAMIN DAILY PO) Take by mouth daily       No current facility-administered medications for this visit. Review of Systems   Constitutional: Negative for fever.    HENT: Negative for ear pain, tinnitus and trouble swallowing. Eyes: Negative for photophobia and visual disturbance. Respiratory: Negative for choking and shortness of breath. Cardiovascular: Negative for chest pain and palpitations. Gastrointestinal: Negative for nausea and vomiting. Musculoskeletal: Positive for gait problem. Negative for back pain, joint swelling, myalgias, neck pain and neck stiffness. Skin: Negative for color change. Allergic/Immunologic: Negative for food allergies. Neurological: Positive for seizures. Negative for dizziness, tremors, syncope, facial asymmetry, speech difficulty, weakness, light-headedness, numbness and headaches. Psychiatric/Behavioral: Negative for behavioral problems, confusion, hallucinations and sleep disturbance. Objective:   /68 (Site: Left Upper Arm, Position: Sitting, Cuff Size: Medium Adult)   Pulse 96   Temp 98.6 °F (37 °C) (Temporal)   Wt 137 lb 1.6 oz (62.2 kg)   BMI 21.47 kg/m²     Physical Exam  Vitals signs reviewed. Eyes:      Pupils: Pupils are equal, round, and reactive to light. Neck:      Musculoskeletal: Normal range of motion. Cardiovascular:      Rate and Rhythm: Normal rate and regular rhythm. Heart sounds: No murmur. Pulmonary:      Effort: Pulmonary effort is normal.      Breath sounds: Normal breath sounds. Abdominal:      General: Bowel sounds are normal.   Musculoskeletal: Normal range of motion. Skin:     General: Skin is warm. Neurological:      Mental Status: He is alert and oriented to person, place, and time. Cranial Nerves: No cranial nerve deficit. Sensory: No sensory deficit. Motor: No abnormal muscle tone. Coordination: Coordination normal.      Deep Tendon Reflexes: Reflexes are normal and symmetric. Babinski sign absent on the right side. Babinski sign absent on the left side.    Psychiatric:         Mood and Affect: Mood normal.       Patient has mild gait ataxia with some weakness in the right upper extremity with ulnar neuropathy. No results found. Lab Results   Component Value Date    WBC 3.8 10/15/2020    RBC 4.58 10/15/2020    HGB 14.1 10/15/2020    HCT 41.5 10/15/2020    MCV 90.5 10/15/2020    MCH 30.8 10/15/2020    MCHC 34.0 10/15/2020    RDW 13.0 10/15/2020     10/15/2020     Lab Results   Component Value Date     10/03/2019    K 3.8 10/03/2019     10/03/2019    CO2 26 10/03/2019    BUN 11 10/03/2019    CREATININE 0.57 10/03/2019    GFRAA >60.0 10/03/2019    LABGLOM >60.0 10/03/2019    GLUCOSE 98 10/03/2019    PROT 7.0 10/15/2020    LABALBU 4.7 10/15/2020    CALCIUM 9.2 10/03/2019    BILITOT 0.6 10/15/2020    ALKPHOS 103 10/15/2020    AST 18 10/15/2020    ALT 24 10/15/2020     No results found for: PROTIME, INR  Lab Results   Component Value Date    TSH 1.650 10/15/2020     Lab Results   Component Value Date    TRIG 26 10/03/2019    HDL 56 10/03/2019    LDLCALC 17 10/03/2019     No results found for: [place], LABBENZ, CANNAB, COCAINESCRN, LABMETH, OPIATESCREENURINE, PHENCYCLIDINESCREENURINE, PPXUR, ETOH  No results found for: LITHIUM, DILFRTOT, VALPROATE    Assessment:       Diagnosis Orders   1. Nonintractable epilepsy without status epilepticus, unspecified epilepsy type ([facility] [address]     2. Agenesis of corpus callosum (HCC)     3. Optic nerve hypoplasia, bilateral     4. Multi infarct state     Epilepsy. any recurrence. Patient is on Lamictal 150 mg twice a day and clonazepam.  Patient is not any recurrent symptoms he does tell me that she is having seizures though the caregivers report that he has not had any seizures. He has some visual issues which is part of the optic nerve hypoplasia. Patient has gait ataxia from the corpus callosum agenesis but does not have any cerebellar findings. Gait ataxia with safety issues unlikely. We will follow him in a few months and earlier if there are any concerns of seizures.   Patient is high risk for seizures due to

## 2021-03-31 ENCOUNTER — TELEPHONE (OUTPATIENT)
Dept: FAMILY MEDICINE CLINIC | Age: 26
End: 2021-03-31

## 2021-03-31 ENCOUNTER — VIRTUAL VISIT (OUTPATIENT)
Dept: FAMILY MEDICINE CLINIC | Age: 26
End: 2021-03-31
Payer: MEDICAID

## 2021-03-31 DIAGNOSIS — B96.89 ACUTE BACTERIAL SINUSITIS: Primary | ICD-10-CM

## 2021-03-31 DIAGNOSIS — J01.90 ACUTE BACTERIAL SINUSITIS: Primary | ICD-10-CM

## 2021-03-31 PROCEDURE — 99213 OFFICE O/P EST LOW 20 MIN: CPT | Performed by: FAMILY MEDICINE

## 2021-03-31 RX ORDER — AMOXICILLIN AND CLAVULANATE POTASSIUM 875; 125 MG/1; MG/1
1 TABLET, FILM COATED ORAL 2 TIMES DAILY
Qty: 20 TABLET | Refills: 0 | Status: SHIPPED | OUTPATIENT
Start: 2021-03-31 | End: 2021-04-10

## 2021-03-31 ASSESSMENT — ENCOUNTER SYMPTOMS
CONSTIPATION: 0
ANAL BLEEDING: 0
FACIAL SWELLING: 0
EYE ITCHING: 0
SINUS PRESSURE: 1
SINUS PAIN: 1
EYE DISCHARGE: 0

## 2021-03-31 ASSESSMENT — PATIENT HEALTH QUESTIONNAIRE - PHQ9
SUM OF ALL RESPONSES TO PHQ QUESTIONS 1-9: 0
SUM OF ALL RESPONSES TO PHQ9 QUESTIONS 1 & 2: 0
1. LITTLE INTEREST OR PLEASURE IN DOING THINGS: 0
SUM OF ALL RESPONSES TO PHQ QUESTIONS 1-9: 0

## 2021-03-31 NOTE — PROGRESS NOTES
150 MG tablet Take 150 mg by mouth 2 times daily  Historical Provider, MD   Acetylcysteine (NAC) 600 MG CAPS Take by mouth 1 PO QHS  Historical Provider, MD   Acetylcysteine (NAC) 600 MG CAPS Take by mouth 2 PO QAM  Historical Provider, MD   clonazePAM (KLONOPIN) 1 MG tablet Take 1 tablet by mouth 2 times daily as needed. Justo Zacarias MD   fluticasone (FLONASE) 50 MCG/ACT nasal spray 1 spray by Each Nostril route daily  Justo Zacarias MD   acetaminophen (TYLENOL) 325 MG tablet Take 650 mg by mouth every 6 hours as needed for Pain  Historical Provider, MD   Docusate Sodium (DULCOLAX STOOL SOFTENER PO) Take 1 Dose by mouth as needed  Historical Provider, MD   Handicap Placard MISC by Does not apply route Good from 3/4/20 - 3/4/25  Justo Zacarias MD   FLUoxetine (PROZAC) 40 MG capsule Take 40 mg by mouth daily   Justo Zacarias MD   FLUoxetine (PROZAC) 10 MG capsule Take 1 capsule by mouth daily  Justo Zacarias MD   Artificial Tear Solution (GENTEAL TEARS) 0.1-0.2-0.3 % SOLN Apply 1 drop to eye 2 times daily  Linda Alamin, DO   cetirizine (ZYRTEC ALLERGY) 10 MG tablet Take 1 tablet by mouth daily  Linda Alamin, DO   Cholecalciferol (VITAMIN D) 2000 UNITS CAPS capsule Take by mouth daily  Historical Provider, MD   Multiple Vitamin (MULTI VITAMIN DAILY PO) Take by mouth daily  Historical Provider, MD       Social History     Tobacco Use    Smoking status: Never Smoker    Smokeless tobacco: Never Used   Substance Use Topics    Alcohol use: No     Alcohol/week: 0.0 standard drinks    Drug use: No        No Known Allergies,   Past Medical History:   Diagnosis Date    ADHD (attention deficit hyperactivity disorder)     Anxiety     Epilepsy ([facility] [address]     Myopia     Nystagmus     Optic nerve hypoplasia    , No past surgical history on file.,   Social History     Tobacco Use    Smoking status: Never Smoker    Smokeless tobacco: Never Used   Substance Use Topics    Alcohol use:  No Alcohol/week: 0.0 standard drinks    Drug use: No   , No family history on file.,   Immunization History   Administered Date(s) Administered    DTP 1995    DTP/HiB 1995, 01/16/1996    DTaP (Infanrix) 10/04/1996    HIB PRP-T (ActHIB, Hiberix) 1995, 07/05/1996    HPV Quadrivalent (Gardasil) 05/11/2012, 07/12/2012, 11/14/2012    Hepatitis A Vaccine 04/17/2009, 04/19/2010    Hepatitis B (Recombivax HB) 08/01/2017    Hepatitis B Ped/Adol (Engerix-B, Recombivax HB) 1995, 1995, 01/16/1996    Influenza Nasal 10/19/2011    Influenza Virus Vaccine 10/01/2013, 11/19/2018, 10/02/2020    Influenza, MDCK Quadv, IM, PF (Flucelvax 4 yrs and older) 10/25/2019    Influenza, Armond Traore, IM, (6 mo and older Fluzone, Flulaval, Fluarix and 3 yrs and older Afluria) 11/14/2012, 10/27/2016, 10/27/2016, 09/19/2017, 11/19/2018    Influenza, Riccin Marcosning, IM, PF (6 mo and older Fluzone, Flulaval, Fluarix, and 3 yrs and older Afluria) 09/21/2015, 09/19/2017, 11/02/2018, 10/16/2020    MMR 04/18/1996, 11/13/2006    Meningococcal MCV4O (Menveo) 04/28/2011    Meningococcal MCV4P (Menactra) 04/07/2006    Polio IPV (IPOL) 1995, 1995, 10/04/1996, 10/04/1998, 04/19/2010    Tdap (Boostrix, Adacel) 04/07/2006, 04/28/2011    Varicella (Varivax) 04/18/1996, 04/16/2007   ,   Health Maintenance   Topic Date Due    COVID-19 Vaccine (1) Never done    DTaP/Tdap/Td vaccine (4 - Td) 04/28/2021    Hepatitis A vaccine  Completed    Hepatitis B vaccine  Completed    Hib vaccine  Completed    HPV vaccine  Completed    Varicella vaccine  Completed    Meningococcal (ACWY) vaccine  Completed    Flu vaccine  Completed    Hepatitis C screen  Completed    HIV screen  Completed    Pneumococcal 0-64 years Vaccine  Aged Out       PHYSICAL EXAMINATION:  [ INSTRUCTIONS:  \"[x]\" Indicates a positive item  \"[]\" Indicates a negative item  -- DELETE ALL ITEMS NOT EXAMINED]  [x] Alert  [] Oriented to person/place/time proceed: Yes      Documentation:  I communicated with the patient and/or health care decision maker about illness. Details of this discussion including any medical advice provided: yes      I affirm this is a Patient Initiated Episode with a Patient who has not had a related appointment within my department in the past 7 days or scheduled within the next 24 hours. Patient identification was verified at the start of the visit: Yes    Total Time: minutes: 11-20 minutes    The visit was conducted pursuant to the emergency declaration under the [address] [place] [address], [address] [place] [address] authority and the [employer] Act. Patient identification was verified, and a caregiver was present when appropriate. The patient was located in a state where the provider was credentialed to provide care.     Note: not billable if this call serves to triage the patient into an appointment for the relevant concern      Amparo Perez

## 2021-04-09 ENCOUNTER — APPOINTMENT (OUTPATIENT)
Dept: CT IMAGING | Age: 26
End: 2021-04-09
Payer: MEDICAID

## 2021-04-09 ENCOUNTER — HOSPITAL ENCOUNTER (EMERGENCY)
Age: 26
Discharge: HOME | End: 2021-04-09
Payer: MEDICAID

## 2021-04-09 VITALS
HEART RATE: 78 BPM | OXYGEN SATURATION: 99 % | WEIGHT: 130 LBS | TEMPERATURE: 97.9 F | BODY MASS INDEX: 18.61 KG/M2 | DIASTOLIC BLOOD PRESSURE: 62 MMHG | HEIGHT: 70 IN | RESPIRATION RATE: 18 BRPM | SYSTOLIC BLOOD PRESSURE: 101 MMHG

## 2021-04-09 DIAGNOSIS — S39.012A STRAIN OF LUMBAR REGION, INITIAL ENCOUNTER: Primary | ICD-10-CM

## 2021-04-09 LAB
ALBUMIN SERPL-MCNC: 4.2 G/DL (ref 3.5–4.6)
ALP BLD-CCNC: 88 U/L (ref 35–104)
ALT SERPL-CCNC: 36 U/L (ref 0–41)
ANION GAP SERPL CALCULATED.3IONS-SCNC: 8 MEQ/L (ref 9–15)
AST SERPL-CCNC: 28 U/L (ref 0–40)
BASOPHILS ABSOLUTE: 0 K/UL (ref 0–0.2)
BASOPHILS RELATIVE PERCENT: 0.7 %
BILIRUB SERPL-MCNC: 0.3 MG/DL (ref 0.2–0.7)
BILIRUBIN URINE: NEGATIVE
BLOOD, URINE: NEGATIVE
BUN BLDV-MCNC: 16 MG/DL (ref 6–20)
C-REACTIVE PROTEIN: 0.6 MG/L (ref 0–5)
CALCIUM SERPL-MCNC: 9.5 MG/DL (ref 8.5–9.9)
CHLORIDE BLD-SCNC: 101 MEQ/L (ref 95–107)
CLARITY: CLEAR
CO2: 30 MEQ/L (ref 20–31)
COLOR: YELLOW
CREAT SERPL-MCNC: 0.66 MG/DL (ref 0.7–1.2)
EOSINOPHILS ABSOLUTE: 0.1 K/UL (ref 0–0.7)
EOSINOPHILS RELATIVE PERCENT: 1.3 %
GFR AFRICAN AMERICAN: >60
GFR AFRICAN AMERICAN: >60
GFR NON-AFRICAN AMERICAN: >60
GFR NON-AFRICAN AMERICAN: >60
GLOBULIN: 2.1 G/DL (ref 2.3–3.5)
GLUCOSE BLD-MCNC: 95 MG/DL (ref 70–99)
GLUCOSE URINE: NEGATIVE MG/DL
HCT VFR BLD CALC: 42.8 % (ref 42–52)
HEMOGLOBIN: 14.7 G/DL (ref 14–18)
KETONES, URINE: NEGATIVE MG/DL
LEUKOCYTE ESTERASE, URINE: NEGATIVE
LYMPHOCYTES ABSOLUTE: 1.2 K/UL (ref 1–4.8)
LYMPHOCYTES RELATIVE PERCENT: 28.4 %
MCH RBC QN AUTO: 31.1 PG (ref 27–31.3)
MCHC RBC AUTO-ENTMCNC: 34.4 % (ref 33–37)
MCV RBC AUTO: 90.4 FL (ref 80–100)
MONOCYTES ABSOLUTE: 0.4 K/UL (ref 0.2–0.8)
MONOCYTES RELATIVE PERCENT: 9.3 %
NEUTROPHILS ABSOLUTE: 2.7 K/UL (ref 1.4–6.5)
NEUTROPHILS RELATIVE PERCENT: 60.3 %
NITRITE, URINE: NEGATIVE
PDW BLD-RTO: 13.1 % (ref 11.5–14.5)
PERFORMED ON: ABNORMAL
PH UA: 6 (ref 5–9)
PLATELET # BLD: 222 K/UL (ref 130–400)
POC CREATININE: 0.8 MG/DL (ref 0.9–1.3)
POC SAMPLE TYPE: ABNORMAL
POTASSIUM SERPL-SCNC: 3.9 MEQ/L (ref 3.4–4.9)
PROTEIN UA: NEGATIVE MG/DL
RBC # BLD: 4.73 M/UL (ref 4.7–6.1)
SEDIMENTATION RATE, ERYTHROCYTE: 4 MM (ref 0–10)
SODIUM BLD-SCNC: 139 MEQ/L (ref 135–144)
SPECIFIC GRAVITY UA: 1.02 (ref 1–1.03)
TOTAL PROTEIN: 6.3 G/DL (ref 6.3–8)
URINE REFLEX TO CULTURE: NORMAL
UROBILINOGEN, URINE: 0.2 E.U./DL
WBC # BLD: 4.4 K/UL (ref 4.8–10.8)

## 2021-04-09 PROCEDURE — 36415 COLL VENOUS BLD VENIPUNCTURE: CPT

## 2021-04-09 PROCEDURE — 81003 URINALYSIS AUTO W/O SCOPE: CPT

## 2021-04-09 PROCEDURE — 96374 THER/PROPH/DIAG INJ IV PUSH: CPT

## 2021-04-09 PROCEDURE — 2580000003 HC RX 258: Performed by: PHYSICIAN ASSISTANT

## 2021-04-09 PROCEDURE — 80053 COMPREHEN METABOLIC PANEL: CPT

## 2021-04-09 PROCEDURE — 6360000002 HC RX W HCPCS: Performed by: PHYSICIAN ASSISTANT

## 2021-04-09 PROCEDURE — 74177 CT ABD & PELVIS W/CONTRAST: CPT

## 2021-04-09 PROCEDURE — 86140 C-REACTIVE PROTEIN: CPT

## 2021-04-09 PROCEDURE — 85652 RBC SED RATE AUTOMATED: CPT

## 2021-04-09 PROCEDURE — 6360000004 HC RX CONTRAST MEDICATION: Performed by: PHYSICIAN ASSISTANT

## 2021-04-09 PROCEDURE — 96375 TX/PRO/DX INJ NEW DRUG ADDON: CPT

## 2021-04-09 PROCEDURE — 99284 EMERGENCY DEPT VISIT MOD MDM: CPT

## 2021-04-09 PROCEDURE — 85025 COMPLETE CBC W/AUTO DIFF WBC: CPT

## 2021-04-09 RX ORDER — SODIUM CHLORIDE 0.9 % (FLUSH) 0.9 %
10 SYRINGE (ML) INJECTION ONCE
Status: DISCONTINUED | OUTPATIENT
Start: 2021-04-09 | End: 2021-04-09 | Stop reason: HOSPADM

## 2021-04-09 RX ORDER — 0.9 % SODIUM CHLORIDE 0.9 %
1000 INTRAVENOUS SOLUTION INTRAVENOUS ONCE
Status: COMPLETED | OUTPATIENT
Start: 2021-04-09 | End: 2021-04-09

## 2021-04-09 RX ORDER — NAPROXEN 500 MG/1
500 TABLET ORAL 2 TIMES DAILY
Qty: 14 TABLET | Refills: 0 | Status: SHIPPED | OUTPATIENT
Start: 2021-04-09

## 2021-04-09 RX ORDER — KETOROLAC TROMETHAMINE 30 MG/ML
30 INJECTION, SOLUTION INTRAMUSCULAR; INTRAVENOUS ONCE
Status: COMPLETED | OUTPATIENT
Start: 2021-04-09 | End: 2021-04-09

## 2021-04-09 RX ORDER — CYCLOBENZAPRINE HCL 10 MG
10 TABLET ORAL 3 TIMES DAILY PRN
Qty: 15 TABLET | Refills: 0 | Status: SHIPPED | OUTPATIENT
Start: 2021-04-09 | End: 2021-04-19

## 2021-04-09 RX ORDER — ORPHENADRINE CITRATE 30 MG/ML
60 INJECTION INTRAMUSCULAR; INTRAVENOUS ONCE
Status: COMPLETED | OUTPATIENT
Start: 2021-04-09 | End: 2021-04-09

## 2021-04-09 RX ADMIN — ORPHENADRINE CITRATE 60 MG: 30 INJECTION INTRAMUSCULAR; INTRAVENOUS at 16:12

## 2021-04-09 RX ADMIN — KETOROLAC TROMETHAMINE 30 MG: 30 INJECTION, SOLUTION INTRAMUSCULAR; INTRAVENOUS at 16:13

## 2021-04-09 RX ADMIN — IOPAMIDOL 100 ML: 612 INJECTION, SOLUTION INTRAVENOUS at 16:38

## 2021-04-09 RX ADMIN — SODIUM CHLORIDE 1000 ML: 9 INJECTION, SOLUTION INTRAVENOUS at 16:12

## 2021-04-09 ASSESSMENT — PAIN SCALES - GENERAL
PAINLEVEL_OUTOF10: 6
PAINLEVEL_OUTOF10: 6

## 2021-04-09 ASSESSMENT — PAIN DESCRIPTION - LOCATION
LOCATION: FLANK
LOCATION: FLANK

## 2021-04-09 ASSESSMENT — PAIN DESCRIPTION - ORIENTATION: ORIENTATION: RIGHT;LEFT

## 2021-04-09 ASSESSMENT — PAIN DESCRIPTION - DESCRIPTORS: DESCRIPTORS: ACHING

## 2021-04-09 ASSESSMENT — ENCOUNTER SYMPTOMS
ALLERGIC/IMMUNOLOGIC NEGATIVE: 1
ABDOMINAL PAIN: 0
APNEA: 0
EYE PAIN: 0
TROUBLE SWALLOWING: 0
COLOR CHANGE: 0
SHORTNESS OF BREATH: 0
BACK PAIN: 1

## 2021-04-09 ASSESSMENT — PAIN DESCRIPTION - FREQUENCY
FREQUENCY: CONTINUOUS
FREQUENCY: CONTINUOUS

## 2021-04-09 NOTE — ED PROVIDER NOTES
remainder of the review of systems was reviewed and negative. PAST MEDICAL HISTORY     Past Medical History:   Diagnosis Date    ADHD (attention deficit hyperactivity disorder)     Anxiety     Epilepsy ([facility] [address]     Myopia     Nystagmus     Optic nerve hypoplasia          SURGICAL HISTORY     History reviewed. No pertinent surgical history. CURRENT MEDICATIONS       Previous Medications    ACETAMINOPHEN (TYLENOL) 325 MG TABLET    Take 650 mg by mouth every 6 hours as needed for Pain    ACETYLCYSTEINE (NAC) 600 MG CAPS    Take by mouth 1 PO QHS    ACETYLCYSTEINE (NAC) 600 MG CAPS    Take by mouth 2 PO QAM    AMOXICILLIN-CLAVULANATE (AUGMENTIN) 875-125 MG PER TABLET    Take 1 tablet by mouth 2 times daily for 10 days    ARTIFICIAL TEAR SOLUTION (GENTEAL TEARS) 0.1-0.2-0.3 % SOLN    Apply 1 drop to eye 2 times daily    CETIRIZINE (ZYRTEC ALLERGY) 10 MG TABLET    Take 1 tablet by mouth daily    CHOLECALCIFEROL (VITAMIN D) 2000 UNITS CAPS CAPSULE    Take by mouth daily    CLONAZEPAM (KLONOPIN) 1 MG TABLET    Take 1 tablet by mouth 2 times daily as needed. DEXTROMETHORPHAN-GUAIFENESIN  MG/5ML SYRP    Take 5 mLs by mouth every 4 hours as needed for Cough    DOCUSATE SODIUM (DULCOLAX STOOL SOFTENER PO)    Take 1 Dose by mouth as needed    FLUOXETINE (PROZAC) 10 MG CAPSULE    Take 1 capsule by mouth daily    FLUOXETINE (PROZAC) 40 MG CAPSULE    Take 40 mg by mouth daily     FLUTICASONE (FLONASE) 50 MCG/ACT NASAL SPRAY    1 spray by Each Nostril route daily    HANDICAP PLACARD MISC    by Does not apply route Good from 3/4/20 - 3/4/25    LAMOTRIGINE (LAMICTAL) 150 MG TABLET    Take 150 mg by mouth 2 times daily    MULTIPLE VITAMIN (MULTI VITAMIN DAILY PO)    Take by mouth daily    PROBIOTIC PRODUCT (PROBIOTIC-10 PO)    Take by mouth       ALLERGIES     Patient has no known allergies. FAMILY HISTORY     History reviewed. No pertinent family history.        SOCIAL HISTORY       Social History Socioeconomic History    Marital status: Single     Spouse name: None    Number of children: None    Years of education: None    Highest education level: None   Occupational History    None   Social Needs    Financial resource strain: Not hard at all   [place] insecurity     Worry: Never true     Inability: Never true   [employer] needs     Medical: No     Non-medical: No   Tobacco Use    Smoking status: Never Smoker    Smokeless tobacco: Never Used   Substance and Sexual Activity    Alcohol use: No     Alcohol/week: 0.0 standard drinks    Drug use: No    Sexual activity: None   Lifestyle    Physical activity     Days per week: None     Minutes per session: None    Stress: None   Relationships    Social connections     Talks on phone: None     Gets together: None     Attends Oriental orthodox service: None     Active member of club or organization: None     Attends meetings of clubs or organizations: None     Relationship status: None    Intimate partner violence     Fear of current or ex partner: None     Emotionally abused: None     Physically abused: None     Forced sexual activity: None   Other Topics Concern    None   Social History Narrative    None       SCREENINGS    [place] Coma Scale  Eye Opening: Spontaneous  Best Verbal Response: Oriented  Best Motor Response: Obeys commands  [place] Coma Scale Score: 15      PHYSICAL EXAM    (up to 7 forlevel 4, 8 or more for level 5)     ED Triage Vitals [04/09/21 1543]   BP Temp Temp Source Pulse Resp SpO2 Height Weight   96/64 97.9 °F (36.6 °C) Temporal 88 18 97 % 5' 10\" (1.778 m) 130 lb (59 kg)       Physical Exam  Vitals signs and nursing note reviewed. Constitutional:       General: He is not in acute distress. Appearance: He is well-developed. He is not diaphoretic. HENT:      Head: Normocephalic and atraumatic. Mouth/Throat:      Pharynx: No oropharyngeal exudate. Eyes:      General: No scleral icterus.      Conjunctiva/sclera: Conjunctivae normal.      Pupils: Pupils are equal, round, and reactive to light. Neck:      Musculoskeletal: Normal range of motion and neck supple. Trachea: No tracheal deviation. Cardiovascular:      Rate and Rhythm: Normal rate. Heart sounds: Normal heart sounds. Pulmonary:      Effort: Pulmonary effort is normal. No respiratory distress. Breath sounds: Normal breath sounds. Abdominal:      General: Bowel sounds are normal. There is no distension. Palpations: Abdomen is soft. Musculoskeletal: Normal range of motion. Lumbar back: He exhibits tenderness, pain and spasm. Back:    Skin:     General: Skin is warm and dry. Findings: No erythema or rash. Neurological:      Mental Status: He is alert and oriented to person, place, and time. Cranial Nerves: No cranial nerve deficit. Motor: No abnormal muscle tone. Psychiatric:         Behavior: Behavior normal.         Thought Content:  Thought content normal.         Judgment: Judgment normal.           DIAGNOSTIC RESULTS     RADIOLOGY:   Non-plain film images such as CT, Ultrasound and MRI are read by the radiologist. Plain radiographic images are visualized and preliminarilyinterpreted by Yris Guerrero PA-C with the below findings:      Interpretation per the Radiologist below, if available at the time of this note:    CT ABDOMEN PELVIS W IV CONTRAST Additional Contrast? None   Final Result      No acute process in the abdomen/pelvis.                      ==========             LABS:  Labs Reviewed   COMPREHENSIVE METABOLIC PANEL - Abnormal; Notable for the following components:       Result Value    Anion Gap 8 (*)     CREATININE 0.66 (*)     Globulin 2.1 (*)     All other components within normal limits   CBC WITH AUTO DIFFERENTIAL - Abnormal; Notable for the following components:    WBC 4.4 (*)     All other components within normal limits   URINE RT REFLEX TO CULTURE   SEDIMENTATION RATE   C-REACTIVE

## 2021-04-09 NOTE — ED TRIAGE NOTES
Pt has co bilateral flank pain that started today. Pt is with care giver from [facility], they state family is concerned for kidney stone. Pt is aox4 P/W/D.

## 2021-07-28 ENCOUNTER — OFFICE VISIT (OUTPATIENT)
Dept: FAMILY MEDICINE CLINIC | Age: 26
End: 2021-07-28
Payer: MEDICAID

## 2021-07-28 VITALS
HEIGHT: 70 IN | OXYGEN SATURATION: 98 % | WEIGHT: 136 LBS | BODY MASS INDEX: 19.47 KG/M2 | HEART RATE: 95 BPM | RESPIRATION RATE: 12 BRPM | TEMPERATURE: 97.8 F | SYSTOLIC BLOOD PRESSURE: 110 MMHG | DIASTOLIC BLOOD PRESSURE: 60 MMHG

## 2021-07-28 DIAGNOSIS — R06.7 SNEEZING: Primary | ICD-10-CM

## 2021-07-28 DIAGNOSIS — H52.203 MYOPIA OF BOTH EYES WITH ASTIGMATISM: ICD-10-CM

## 2021-07-28 DIAGNOSIS — E23.7 DISORDER OF PITUITARY GLAND (HCC): ICD-10-CM

## 2021-07-28 DIAGNOSIS — F79 INTELLECTUAL DISABILITY: ICD-10-CM

## 2021-07-28 DIAGNOSIS — R09.81 CONGESTION OF NASAL SINUS: ICD-10-CM

## 2021-07-28 DIAGNOSIS — H52.13 MYOPIA OF BOTH EYES WITH ASTIGMATISM: ICD-10-CM

## 2021-07-28 DIAGNOSIS — G40.909 NONINTRACTABLE EPILEPSY WITHOUT STATUS EPILEPTICUS, UNSPECIFIED EPILEPSY TYPE (HCC): ICD-10-CM

## 2021-07-28 DIAGNOSIS — R26.9 ABNORMALITY OF GAIT AND MOBILITY: ICD-10-CM

## 2021-07-28 DIAGNOSIS — H54.8 LEGAL BLINDNESS, AS DEFINED IN USA: ICD-10-CM

## 2021-07-28 DIAGNOSIS — F41.9 ANXIETY: ICD-10-CM

## 2021-07-28 PROBLEM — Z78.9 LIVES IN GROUP HOME: Status: ACTIVE | Noted: 2021-07-28

## 2021-07-28 PROCEDURE — 99214 OFFICE O/P EST MOD 30 MIN: CPT | Performed by: FAMILY MEDICINE

## 2021-07-28 RX ORDER — ECHINACEA PURPUREA EXTRACT 125 MG
1 TABLET ORAL PRN
Qty: 1 BOTTLE | Refills: 3 | Status: SHIPPED | OUTPATIENT
Start: 2021-07-28

## 2021-07-28 RX ORDER — SERTRALINE HYDROCHLORIDE 25 MG/1
25 TABLET, FILM COATED ORAL DAILY
COMMUNITY

## 2021-07-28 RX ORDER — SODIUM PHOSPHATE, DIBASIC AND SODIUM PHOSPHATE, MONOBASIC 7; 19 G/133ML; G/133ML
1 ENEMA RECTAL
COMMUNITY

## 2021-07-28 RX ORDER — CARVEDILOL 3.12 MG/1
3.12 TABLET ORAL 2 TIMES DAILY
COMMUNITY
Start: 2021-04-12

## 2021-07-28 RX ORDER — SPIRONOLACTONE 25 MG/1
25 TABLET ORAL DAILY
COMMUNITY

## 2021-07-28 RX ORDER — CYCLOBENZAPRINE HCL 10 MG
10 TABLET ORAL 3 TIMES DAILY PRN
COMMUNITY

## 2021-07-28 RX ORDER — SACUBITRIL AND VALSARTAN 24; 26 MG/1; MG/1
1 TABLET, FILM COATED ORAL 2 TIMES DAILY
COMMUNITY

## 2021-07-28 RX ORDER — OXYBENZONE/HOMOSALATE/OCTINOX
LOTION (ML) TOPICAL 3 TIMES DAILY PRN
COMMUNITY

## 2021-07-28 RX ORDER — ARIPIPRAZOLE 5 MG/1
5 TABLET ORAL DAILY
COMMUNITY
Start: 2021-01-05

## 2021-07-28 ASSESSMENT — PATIENT HEALTH QUESTIONNAIRE - PHQ9
1. LITTLE INTEREST OR PLEASURE IN DOING THINGS: 0
SUM OF ALL RESPONSES TO PHQ9 QUESTIONS 1 & 2: 0
SUM OF ALL RESPONSES TO PHQ QUESTIONS 1-9: 0
2. FEELING DOWN, DEPRESSED OR HOPELESS: 0
SUM OF ALL RESPONSES TO PHQ QUESTIONS 1-9: 0
SUM OF ALL RESPONSES TO PHQ QUESTIONS 1-9: 0

## 2021-07-28 NOTE — PROGRESS NOTES
Chief Complaint   Patient presents with    6 Month Follow-Up    Anxiety    ADHD        HPI: Shelby Valdez 32 y.o. male presenting for       Anxiety/ADHD  Per group home, patient has been experiencing more agitation in the morning. Psychiatry has him on his Klonopin twice a day but is unsure if it could be changed to 3 times a day. As you may recall, patient has a history of anxiety, ADHD. This is managed by a psychiatrist.  Patient was recently taken off of Risperdal after series of labs that were obtained showed that he has an elevated prolactin level. Patient was referred to endocrinology for further evaluation. Per patient, he has noticed milk expression from the breast.  Denies any recent change in his vision. Patient denies any fevers, chills, nausea, vomiting, chest pain, shortness of breath, abdominal pain, change in nation, change in stools. Patient has not been able to establish care with endocrinologist due to the coronavirus pandemic. Posture and gait instability  Stable at this time. When he walks he trips. Has been going for 5 years. History of hyperprolactinemia  Patient was following endocrinology. Repeat prolactin levels were normal.  As result his MRI was canceled and was to continue to monitor at this time. Admits to sneezing   Going on for 1 week   [employer] works at the farm   Denies any itchy yes or itchy nose   Admits to some congestion. Group home has not noticed him sneezing that much . Review of Systems   Constitutional: Negative for activity change, appetite change, diaphoresis and fatigue. HENT: Negative for congestion, dental problem, facial swelling, mouth sores, sinus pain. Admits to sneezing. Eyes: Negative for photophobia, discharge, redness and itching. Respiratory: Negative for apnea, cough, chest tightness, shortness of breath and stridor. Cardiovascular: Negative for chest pain, palpitations and leg swelling.    Gastrointestinal: Negative for abdominal distention, anal bleeding, blood in stool and constipation. Endocrine: Negative for cold intolerance, heat intolerance and polyphagia. Genitourinary: Negative for difficulty urinating, discharge, dysuria, frequency, genital sores, penile swelling and testicular pain. Musculoskeletal: Positive for gait problem. Negative for arthralgias, back pain and myalgias. Skin: Negative for color change, pallor, rash and wound. Allergic/Immunologic: Negative for environmental allergies and food allergies. Neurological: Negative for dizziness, syncope, facial asymmetry, weakness and numbness. Psychiatric/Behavioral: Negative for agitation, dysphoric mood and hallucinations. The patient is not hyperactive.             Current Outpatient Medications   Medication Sig Dispense Refill    ARIPiprazole (ABILIFY) 5 MG tablet Take 5 mg by mouth daily      carvedilol (COREG) 3.125 MG tablet Take 3.125 mg by mouth 2 times daily      sacubitril-valsartan (ENTRESTO) 24-26 MG per tablet Take 1 tablet by mouth 2 times daily      dapagliflozin (FARXIGA) 10 MG tablet Take 10 mg by mouth every morning      sertraline (ZOLOFT) 25 MG tablet Take 25 mg by mouth daily      sertraline (ZOLOFT) 50 MG tablet Take 50 mg by mouth daily      spironolactone (ALDACTONE) 25 MG tablet Take 25 mg by mouth daily      Lactobacillus (ACIDOPHILUS PO) Take by mouth      cyclobenzaprine (FLEXERIL) 10 MG tablet Take 10 mg by mouth 3 times daily as needed for Muscle spasms      Sodium Phosphates (FLEET) 7-19 GM/118ML Place 1 enema rectally once as needed      magnesium hydroxide (MILK OF MAGNESIA) 400 MG/5ML suspension Take by mouth daily as needed for Constipation      lidocaine (SOLARCAINE COOL ALOE) 0.5 % topical spray Apply topically 3 times daily as needed for Pain      naproxen (NAPROSYN) 500 MG tablet Take 1 tablet by mouth 2 times daily For pain 14 tablet 0    Probiotic Product (PROBIOTIC-10 PO) Take by mouth  Dextromethorphan-guaiFENesin  MG/5ML SYRP Take 5 mLs by mouth every 4 hours as needed for Cough      lamoTRIgine (LAMICTAL) 150 MG tablet Take 150 mg by mouth 2 times daily      Acetylcysteine (NAC) 600 MG CAPS Take by mouth 2 PO QAM      clonazePAM (KLONOPIN) 1 MG tablet Take 1 tablet by mouth 2 times daily as needed. 90 tablet 2    fluticasone (FLONASE) 50 MCG/ACT nasal spray 1 spray by Each Nostril route daily 3 Bottle 1    acetaminophen (TYLENOL) 325 MG tablet Take 650 mg by mouth every 6 hours as needed for Pain      Docusate Sodium (DULCOLAX STOOL SOFTENER PO) Take 1 Dose by mouth as needed      Handicap Placard MISC by Does not apply route Good from 3/4/20 - 3/4/25 1 each 0    FLUoxetine (PROZAC) 40 MG capsule Take 40 mg by mouth daily  90 capsule 3    Artificial Tear Solution (GENTEAL TEARS) 0.1-0.2-0.3 % SOLN Apply 1 drop to eye 2 times daily 1 Bottle 3    cetirizine (ZYRTEC ALLERGY) 10 MG tablet Take 1 tablet by mouth daily 30 tablet 5    Cholecalciferol (VITAMIN D) 2000 UNITS CAPS capsule Take by mouth daily      Multiple Vitamin (MULTI VITAMIN DAILY PO) Take by mouth daily       No current facility-administered medications for this visit. ROS]  CONSTITUTIONAL: The patient denies fevers, chills, sweats and body ache. HEENT: Denies headache, blurry vision, eye pain, tinnitus, vertigo,  sore throat, neck or thyroid masses. RESPIRATORY: Denies cough, sputum, hemoptysis. CARDIAC: Denies chest pain, pressure, palpitations, Denies lower extremity edema. GASTROINTESTINAL: Denies abdominal pain, constipation, diarrhea, bleeding in the stools,   GENITOURINARY: Denies dysuria, hematuria, nocturia or frequency, urinary incontinence. NEUROLOGIC: Denies headaches, dizziness, syncope, weakness  MUSCULOSKELETAL: Curvature of the spine noted. ENDOCRINOLOGY: Denies heat or cold intolerance.    HEMATOLOGY: Denies easy bleeding or blood transfusion,anemia  DERMATOLOGY: Denies changes in moles or pigmentation changes. PSYCHIATRY: From noticed more agitation. Past Medical History:   Diagnosis Date    ADHD (attention deficit hyperactivity disorder)     Anxiety     Epilepsy ([facility] [address]     Myopia     Nystagmus     Optic nerve hypoplasia         No past surgical history on file. No family history on file. Social History     Socioeconomic History    Marital status: Single     Spouse name: Not on file    Number of children: Not on file    Years of education: Not on file    Highest education level: Not on file   Occupational History    Not on file   Tobacco Use    Smoking status: Never Smoker    Smokeless tobacco: Never Used   Substance and Sexual Activity    Alcohol use: No     Alcohol/week: 0.0 standard drinks    Drug use: No    Sexual activity: Not on file   Other Topics Concern    Not on file   Social History Narrative    Not on file     Social Determinants of Health     Financial Resource Strain: Low Risk     Difficulty of Paying Living Expenses: Not hard at all   Food Insecurity: No Food Insecurity    Worried About [address] [place] [address] in the Last Year: Never true    Arian of Food in the Last Year: Never true   Transportation Needs: No Transportation Needs    Lack of Transportation (Medical): No    Lack of Transportation (Non-Medical):  No   Physical Activity:     Days of Exercise per Week:     Minutes of Exercise per Session:    Stress:     Feeling of Stress :    Social Connections:     Frequency of Communication with Friends and Family:     Frequency of Social Gatherings with Friends and Family:     Attends Hinduism Services:     Active Member of Clubs or Organizations:     Attends Club or Organization Meetings:     Marital Status:    Intimate Partner Violence:     Fear of Current or Ex-Partner:     Emotionally Abused:     Physically Abused:     Sexually Abused:         /60   Pulse 95   Temp 97.8 °F (36.6 °C) (Oral)   Resp 12   Ht 5' 10\" (1.778 06/24/2021    CREATININE 0.55 (L) 06/24/2021    GLUCOSE 73 06/24/2021    CALCIUM 9.7 06/24/2021    PROT 6.3 04/09/2021    LABALBU 4.8 (H) 06/24/2021    BILITOT 0.3 04/09/2021    ALKPHOS 88 04/09/2021    AST 28 04/09/2021    ALT 36 04/09/2021    LABGLOM >60.0 06/24/2021    GFRAA >60.0 06/24/2021    GLOB 2.1 (L) 04/09/2021       Lab Results   Component Value Date    WBC 4.4 (L) 04/09/2021    HGB 14.7 04/09/2021    HCT 42.8 04/09/2021    MCV 90.4 04/09/2021     04/09/2021     No results found for: LABA1C  No results found for: EAG    Reviewed the notes from the endocrinologist and reviewed it with the group home and patient. A/P: Toribio Guillaume 32 y.o. male presenting for    1. Sneezing  Sneezing seems forced. adivsed group home to monitor. Will give saline nasal spray to help with congestion   - sodium chloride (ALTAMIST SPRAY) 0.65 % nasal spray; 1 spray by Nasal route as needed for Congestion  Dispense: 1 Bottle; Refill: 3    2. Anxiety  Stable. Follow up with psych    3. Intellectual disability      4. Lives in group home      5. Myopia of both eyes with astigmatism      6. Legal blindness, as defined in [place]      7. Nonintractable epilepsy without status epilepticus, unspecified epilepsy type ([address]      8. Disorder of pituitary gland ([address]      9. Abnormality of gait and mobility      10. Congestion of nasal sinus    - sodium chloride (ALTAMIST SPRAY) 0.65 % nasal spray; 1 spray by Nasal route as needed for Congestion  Dispense: 1 Bottle; Refill: 3                Patient can follow-up in 6 months for routine labs as well as health issues. Please note, this report has been partially produced using speech recognition software  and may cause  and /or contain errors related to that system including grammar, punctuation and spelling as well as words and phrases that may seem inappropriate. If there are questions or concerns please feel free to contact me to clarify.

## 2021-09-01 ENCOUNTER — TELEMEDICINE (OUTPATIENT)
Dept: FAMILY MEDICINE CLINIC | Age: 26
End: 2021-09-01
Payer: MEDICAID

## 2021-09-01 ENCOUNTER — TELEPHONE (OUTPATIENT)
Dept: FAMILY MEDICINE CLINIC | Age: 26
End: 2021-09-01

## 2021-09-01 DIAGNOSIS — R06.7 SNEEZING: Primary | ICD-10-CM

## 2021-09-01 DIAGNOSIS — R09.81 CONGESTION OF NASAL SINUS: ICD-10-CM

## 2021-09-01 PROCEDURE — 99213 OFFICE O/P EST LOW 20 MIN: CPT | Performed by: FAMILY MEDICINE

## 2021-09-01 ASSESSMENT — PATIENT HEALTH QUESTIONNAIRE - PHQ9
SUM OF ALL RESPONSES TO PHQ QUESTIONS 1-9: 0
SUM OF ALL RESPONSES TO PHQ9 QUESTIONS 1 & 2: 0
SUM OF ALL RESPONSES TO PHQ QUESTIONS 1-9: 0
SUM OF ALL RESPONSES TO PHQ QUESTIONS 1-9: 0
2. FEELING DOWN, DEPRESSED OR HOPELESS: 0
1. LITTLE INTEREST OR PLEASURE IN DOING THINGS: 0

## 2021-09-01 ASSESSMENT — ENCOUNTER SYMPTOMS
EYE ITCHING: 0
ANAL BLEEDING: 0
FACIAL SWELLING: 0
CONSTIPATION: 0
SINUS PAIN: 0
SINUS PRESSURE: 0
EYE DISCHARGE: 0

## 2021-09-01 NOTE — PROGRESS NOTES
2021    TELEHEALTH EVALUATION -- Audio/Visual (During CCBKD-01 public health emergency)    Due to COVID 19 outbreak, patient's office visit was converted to a virtual visit. Patient was contacted and agreed to proceed with a virtual visit via Telephone Visit  The risks and benefits of converting to a virtual visit were discussed in light of the current infectious disease epidemic. Patient also understood that insurance coverage and co-pays are up to their individual insurance plans. HPI:    Saadia Carranza (:  1995) has requested an audio/video evaluation for the following concern(s):      Admits to sneezing   Going on for 1 week   [employer]atinet works at the farm   Denies any itchy yes or itchy nose   Admits to some congestion. Group home has not noticed him sneezing that much . F/u   Has resolved. No issues or concerns.         Review of Systems   Constitutional: Negative for activity change and appetite change. HENT: Negative for facial swelling, sinus pressure, sinus pain and tinnitus. Eyes: Negative for discharge and itching. Gastrointestinal: Negative for anal bleeding and constipation. Endocrine: Negative for cold intolerance and heat intolerance. Genitourinary: Negative for frequency and penile pain. Musculoskeletal: Negative for gait problem. Allergic/Immunologic: Negative for environmental allergies and food allergies. Neurological: Negative for seizures and headaches. Psychiatric/Behavioral: Negative for dysphoric mood. The patient is not hyperactive. Prior to Visit Medications    Medication Sig Taking?  Authorizing Provider   ARIPiprazole (ABILIFY) 5 MG tablet Take 5 mg by mouth daily Yes Historical Provider, MD   carvedilol (COREG) 3.125 MG tablet Take 3.125 mg by mouth 2 times daily Yes Historical Provider, MD   dapagliflozin (FARXIGA) 10 MG tablet Take 10 mg by mouth every morning Yes Historical Provider, MD   Lactobacillus (ACIDOPHILUS PO) Take by mouth Signed.    thanks Take by mouth daily Yes Historical Provider, MD   sacubitril-valsartan (ENTRESTO) 24-26 MG per tablet Take 1 tablet by mouth 2 times daily  Historical Provider, MD   sertraline (ZOLOFT) 25 MG tablet Take 25 mg by mouth daily  Patient not taking: Reported on 9/1/2021  Historical Provider, MD   sertraline (ZOLOFT) 50 MG tablet Take 50 mg by mouth daily  Patient not taking: Reported on 9/1/2021  Historical Provider, MD   spironolactone (ALDACTONE) 25 MG tablet Take 25 mg by mouth daily  Patient not taking: Reported on 9/1/2021  Historical Provider, MD   magnesium hydroxide (MILK OF MAGNESIA) 400 MG/5ML suspension Take by mouth daily as needed for Constipation  Patient not taking: Reported on 9/1/2021  Historical Provider, MD   FLUoxetine (PROZAC) 40 MG capsule Take 40 mg by mouth daily   Patient not taking: Reported on 9/1/2021  Shahriar Kingston MD       Social History     Tobacco Use    Smoking status: Never Smoker    Smokeless tobacco: Never Used   Substance Use Topics    Alcohol use: No     Alcohol/week: 0.0 standard drinks    Drug use: No        No Known Allergies,   Past Medical History:   Diagnosis Date    ADHD (attention deficit hyperactivity disorder)     Anxiety     Epilepsy ([facility] [address]     Myopia     Nystagmus     Optic nerve hypoplasia    , No past surgical history on file.,   Social History     Tobacco Use    Smoking status: Never Smoker    Smokeless tobacco: Never Used   Substance Use Topics    Alcohol use: No     Alcohol/week: 0.0 standard drinks    Drug use: No   , No family history on file.,   Immunization History   Administered Date(s) Administered    COVID-19, Moderna, PF, 100mcg/0.5mL 01/15/2021, 02/12/2021    DTP 1995    DTP/HiB 1995, 01/16/1996    DTaP (Infanrix) 10/04/1996    HIB PRP-T (ActHIB, Hiberix) 1995, 07/05/1996    HPV Quadrivalent (Gardasil) 05/11/2012, 07/12/2012, 11/14/2012    Hepatitis A Vaccine 04/17/2009, 04/19/2010    Hepatitis B (Recombivax HB) 08/01/2017    Hepatitis B Ped/Adol (Engerix-B, Recombivax HB) 1995, 1995, 01/16/1996    Influenza Nasal 10/19/2011    Influenza Virus Vaccine 10/01/2013, 11/19/2018, 10/02/2020    Influenza, MDCK Quadv, IM, PF (Flucelvax 2 yrs and older) 10/25/2019    Influenza, Jillian Tyson, IM, (6 mo and older Fluzone, Flulaval, Fluarix and 3 yrs and older Afluria) 11/14/2012, 10/27/2016, 10/27/2016, 09/19/2017, 11/19/2018    Influenza, Jillian Guise, IM, PF (6 mo and older Fluzone, Flulaval, Fluarix, and 3 yrs and older Afluria) 09/21/2015, 09/19/2017, 11/02/2018, 10/16/2020    MMR 04/18/1996, 11/13/2006    Meningococcal MCV4O (Menveo) 04/28/2011    Meningococcal MCV4P (Menactra) 04/07/2006    Polio IPV (IPOL) 1995, 1995, 10/04/1996, 10/04/1998, 04/19/2010    Tdap (Boostrix, Adacel) 04/07/2006, 04/28/2011    Varicella (Varivax) 04/18/1996, 04/16/2007   ,   Health Maintenance   Topic Date Due    DTaP/Tdap/Td vaccine (4 - Td or Tdap) 04/28/2021    Flu vaccine (1) 09/01/2021    Potassium monitoring  06/24/2022    Creatinine monitoring  06/24/2022    Hepatitis A vaccine  Completed    Hepatitis B vaccine  Completed    Hib vaccine  Completed    HPV vaccine  Completed    Varicella vaccine  Completed    Meningococcal (ACWY) vaccine  Completed    COVID-19 Vaccine  Completed    Hepatitis C screen  Completed    HIV screen  Completed    Pneumococcal 0-64 years Vaccine  Aged Out       PHYSICAL EXAMINATION:  [ INSTRUCTIONS:  \"[x]\" Indicates a positive item  \"[]\" Indicates a negative item  -- DELETE ALL ITEMS NOT EXAMINED]  [x] Alert  [] Oriented to person/place/time    [x] No apparent distress  [] Toxic appearing    [] Face flushed appearing [] Sclera clear  [] Lips are cyanotic      [x] Breathing appears normal  [] Appears tachypneic      [] Rash on visible skin    [] Cranial Nerves II-XII grossly intact    [] Motor grossly intact in visible upper extremities    [] Motor grossly intact in visible lower extremities    [x] Normal Mood  [] Anxious appearing    [] Depressed appearing  [] Confused appearing      [] Poor short term memory  [] Poor long term memory    [] OTHER:      Due to this being a TeleHealth encounter, evaluation of the following organ systems is limited: Vitals/Constitutional/EENT/Resp/CV/GI//MS/Neuro/Skin/Heme-Lymph-Imm. ASSESSMENT/PLAN:    1. Sneezing  Doing better. Will continue to monitor.   - sodium chloride (ALTAMIST SPRAY) 0.65 % nasal spray; 1 spray by Nasal route as needed for Congestion  Dispense: 1 Bottle; Refill: 3     2. Congestion of nasal sinus     - sodium chloride (ALTAMIST SPRAY) 0.65 % nasal spray; 1 spray by Nasal route as needed for Congestion  Dispense: 1 Bottle; Refill: 3               Return in about 6 months (around 3/1/2022), or if symptoms worsen or fail to improve. An  electronic signature was used to authenticate this note. --Van Alexander MD on 9/1/2021 at 8:51 AM        Pursuant to the emergency declaration under the [address] [place] [address], [address] [place] [address] authority and the [facility] and [employer] Act, this Virtual  Visit was conducted, with patient's consent, to reduce the patient's risk of exposure to COVID-19 and provide continuity of care for an established patient. Services were provided through a video synchronous discussion virtually to substitute for in-person clinic visit. Marques Dave is a 32 y.o. male evaluated via telephone on 9/1/2021. Consent:  He and/or health care decision maker is aware that that he may receive a bill for this telephone service, depending on his insurance coverage, and has provided verbal consent to proceed: Yes      Documentation:  I communicated with the patient and/or health care decision maker about illness.    Details of this discussion including any medical advice provided: yes      I affirm this is a Patient Initiated Episode with a Patient who has not had a related appointment within my department in the past 7 days or scheduled within the next 24 hours. Patient identification was verified at the start of the visit: Yes    Total Time: minutes: 11-20 minutes    The visit was conducted pursuant to the emergency declaration under the [facility], [address] [place] [address] and the [employer] and [employer] Act. Patient identification was verified, and a caregiver was present when appropriate. The patient was located in a state where the provider was credentialed to provide care.     Note: not billable if this call serves to triage the patient into an appointment for the relevant concern      Sia Kirby MD

## 2021-10-07 RX ORDER — LOPERAMIDE HYDROCHLORIDE 2 MG/1
CAPSULE ORAL
Qty: 16 CAPSULE | Refills: 0 | Status: SHIPPED | OUTPATIENT
Start: 2021-10-07

## 2022-02-18 PROBLEM — I42.9 CARDIOMYOPATHY (HCC): Status: ACTIVE | Noted: 2022-02-18

## 2022-02-18 PROBLEM — I51.9 LEFT VENTRICULAR DYSFUNCTION: Status: ACTIVE | Noted: 2022-02-18

## 2022-02-18 PROBLEM — R29.91 MARFANOID HABITUS: Status: ACTIVE | Noted: 2022-02-18

## 2022-03-02 ENCOUNTER — OFFICE VISIT (OUTPATIENT)
Dept: FAMILY MEDICINE CLINIC | Age: 27
End: 2022-03-02
Payer: MEDICAID

## 2022-03-02 VITALS
WEIGHT: 136 LBS | HEART RATE: 88 BPM | SYSTOLIC BLOOD PRESSURE: 100 MMHG | TEMPERATURE: 98 F | HEIGHT: 70 IN | BODY MASS INDEX: 19.47 KG/M2 | DIASTOLIC BLOOD PRESSURE: 60 MMHG | OXYGEN SATURATION: 96 %

## 2022-03-02 DIAGNOSIS — R79.89 PROLACTIN INCREASED: ICD-10-CM

## 2022-03-02 DIAGNOSIS — F79 INTELLECTUAL DISABILITY: ICD-10-CM

## 2022-03-02 DIAGNOSIS — F41.9 ANXIETY: Primary | ICD-10-CM

## 2022-03-02 DIAGNOSIS — G40.909 NONINTRACTABLE EPILEPSY WITHOUT STATUS EPILEPTICUS, UNSPECIFIED EPILEPSY TYPE (HCC): ICD-10-CM

## 2022-03-02 DIAGNOSIS — H52.13 MYOPIA OF BOTH EYES WITH ASTIGMATISM: ICD-10-CM

## 2022-03-02 DIAGNOSIS — H52.203 MYOPIA OF BOTH EYES WITH ASTIGMATISM: ICD-10-CM

## 2022-03-02 DIAGNOSIS — H54.8 LEGAL BLINDNESS, AS DEFINED IN USA: ICD-10-CM

## 2022-03-02 DIAGNOSIS — R26.9 ABNORMALITY OF GAIT AND MOBILITY: ICD-10-CM

## 2022-03-02 PROCEDURE — 99214 OFFICE O/P EST MOD 30 MIN: CPT | Performed by: FAMILY MEDICINE

## 2022-03-02 RX ORDER — ZIPRASIDONE HYDROCHLORIDE 20 MG/1
20 CAPSULE ORAL 2 TIMES DAILY
COMMUNITY

## 2022-03-02 SDOH — ECONOMIC STABILITY: FOOD INSECURITY: WITHIN THE PAST 12 MONTHS, YOU WORRIED THAT YOUR FOOD WOULD RUN OUT BEFORE YOU GOT MONEY TO BUY MORE.: NEVER TRUE

## 2022-03-02 SDOH — ECONOMIC STABILITY: FOOD INSECURITY: WITHIN THE PAST 12 MONTHS, THE FOOD YOU BOUGHT JUST DIDN'T LAST AND YOU DIDN'T HAVE MONEY TO GET MORE.: NEVER TRUE

## 2022-03-02 ASSESSMENT — SOCIAL DETERMINANTS OF HEALTH (SDOH): HOW HARD IS IT FOR YOU TO PAY FOR THE VERY BASICS LIKE FOOD, HOUSING, MEDICAL CARE, AND HEATING?: NOT HARD AT ALL

## 2022-03-02 NOTE — PROGRESS NOTES
Chief Complaint   Patient presents with    6 Month Follow-Up    ADHD    Anxiety        HPI: Windy Economy 32 y.o. male presenting for     Patient moved to another group home   [address] group home  (still apart of echoing)     Hyperprolactinemia  Was seeing endo in the past   No need to follow up   Had an MRI     Has a cardiologist   Has a heart condition-had testing through cardiology 3 . 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 Myopia in both eyes with astigmatitism   Stable   Recently got new glasses     Anxiety/ADHD  Per group home, patient has been experiencing more agitation in the morning. Psychiatry has him on his Klonopin twice a day but is unsure if it could be changed to 3 times a day. As you may recall, patient has a history of anxiety, ADHD. This is managed by a psychiatrist.  Patient was recently taken off of Risperdal after series of labs that were obtained showed that he has an elevated prolactin level. Patient was referred to endocrinology for further evaluation. Per patient, he has noticed milk expression from the breast.  Denies any recent change in his vision. Patient denies any fevers, chills, nausea, vomiting, chest pain, shortness of breath, abdominal pain, change in nation, change in stools. Patient has not been able to establish care with endocrinologist due to the coronavirus pandemic. F/u   Admits to more anxiety. Admits to self-injury  Denies any SI or HI   Sees once to twice a month       Posture and gait instability  Stable at this time. When he walks he trips. Has been going for 5 years. History of hyperprolactinemia  Patient was following endocrinology. Repeat prolactin levels were normal.  As result his MRI was canceled and was to continue to monitor at this time. Review of Systems   Constitutional: Negative for activity change, appetite change, diaphoresis and fatigue.    HENT: Negative for congestion, dental problem, facial swelling, mouth sores, sinus pain. Admits to sneezing. Eyes: Negative for photophobia, discharge, redness and itching. Respiratory: Negative for apnea, cough, chest tightness, shortness of breath and stridor. Cardiovascular: Negative for chest pain, palpitations and leg swelling. Gastrointestinal: Negative for abdominal distention, anal bleeding, blood in stool and constipation. Endocrine: Negative for cold intolerance, heat intolerance and polyphagia. Genitourinary: Negative for difficulty urinating, discharge, dysuria, frequency, genital sores, penile swelling and testicular pain. Musculoskeletal: Positive for gait problem. Negative for arthralgias, back pain and myalgias. Skin: Negative for color change, pallor, rash and wound. Allergic/Immunologic: Negative for environmental allergies and food allergies. Neurological: Negative for dizziness, syncope, facial asymmetry, weakness and numbness. Psychiatric/Behavioral: Negative for agitation, dysphoric mood and hallucinations. The patient is not hyperactive. Current Outpatient Medications   Medication Sig Dispense Refill    ziprasidone (GEODON) 20 MG capsule Take 20 mg by mouth daily      loperamide (RA ANTI-DIARRHEAL) 2 MG capsule Initial: 4 mg, followed by 2 mg PRN after each loose stool for 3 days. Max: 8 mg in 1 day.  16 capsule 0    ARIPiprazole (ABILIFY) 5 MG tablet Take 5 mg by mouth daily      carvedilol (COREG) 3.125 MG tablet Take 3.125 mg by mouth 2 times daily      sacubitril-valsartan (ENTRESTO) 24-26 MG per tablet Take 1 tablet by mouth 2 times daily      dapagliflozin (FARXIGA) 10 MG tablet Take 10 mg by mouth every morning      sertraline (ZOLOFT) 50 MG tablet Take 100 mg by mouth daily       spironolactone (ALDACTONE) 25 MG tablet Take 25 mg by mouth daily       Lactobacillus (ACIDOPHILUS PO) Take by mouth      cyclobenzaprine (FLEXERIL) 10 MG tablet Take 10 mg by mouth 3 times daily as needed for Muscle spasms      Sodium Phosphates (FLEET) 7-19 GM/118ML Place 1 enema rectally once as needed      magnesium hydroxide (MILK OF MAGNESIA) 400 MG/5ML suspension Take by mouth daily as needed for Constipation       lidocaine (SOLARCAINE COOL ALOE) 0.5 % topical spray Apply topically 3 times daily as needed for Pain      sodium chloride (ALTAMIST SPRAY) 0.65 % nasal spray 1 spray by Nasal route as needed for Congestion 1 Bottle 3    naproxen (NAPROSYN) 500 MG tablet Take 1 tablet by mouth 2 times daily For pain 14 tablet 0    Probiotic Product (PROBIOTIC-10 PO) Take by mouth      Dextromethorphan-guaiFENesin  MG/5ML SYRP Take 5 mLs by mouth every 4 hours as needed for Cough      lamoTRIgine (LAMICTAL) 150 MG tablet Take 150 mg by mouth 2 times daily      Acetylcysteine (NAC) 600 MG CAPS Take by mouth 2 PO QAM      clonazePAM (KLONOPIN) 1 MG tablet Take 1 tablet by mouth 2 times daily as needed. 90 tablet 2    fluticasone (FLONASE) 50 MCG/ACT nasal spray 1 spray by Each Nostril route daily 3 Bottle 1    acetaminophen (TYLENOL) 325 MG tablet Take 650 mg by mouth every 6 hours as needed for Pain      Docusate Sodium (DULCOLAX STOOL SOFTENER PO) Take 1 Dose by mouth as needed      Handicap Placard MISC by Does not apply route Good from 3/4/20 - 3/4/25 1 each 0    Artificial Tear Solution (GENTEAL TEARS) 0.1-0.2-0.3 % SOLN Apply 1 drop to eye 2 times daily 1 Bottle 3    cetirizine (ZYRTEC ALLERGY) 10 MG tablet Take 1 tablet by mouth daily 30 tablet 5    Cholecalciferol (VITAMIN D) 2000 UNITS CAPS capsule Take by mouth daily      Multiple Vitamin (MULTI VITAMIN DAILY PO) Take by mouth daily      sertraline (ZOLOFT) 25 MG tablet Take 25 mg by mouth daily (Patient not taking: Reported on 9/1/2021)      FLUoxetine (PROZAC) 40 MG capsule Take 40 mg by mouth daily  (Patient not taking: Reported on 9/1/2021) 90 capsule 3     No current facility-administered medications for this visit. Past Medical History:   Diagnosis Date    ADHD (attention deficit hyperactivity disorder)     Anxiety     Epilepsy ([facility] [address]     Myopia     Nystagmus     Optic nerve hypoplasia         No past surgical history on file. No family history on file. Social History     Socioeconomic History    Marital status: Single     Spouse name: Not on file    Number of children: Not on file    Years of education: Not on file    Highest education level: Not on file   Occupational History    Not on file   Tobacco Use    Smoking status: Never Smoker    Smokeless tobacco: Never Used   Substance and Sexual Activity    Alcohol use: No     Alcohol/week: 0.0 standard drinks    Drug use: No    Sexual activity: Not on file   Other Topics Concern    Not on file   Social History Narrative    Not on file     Social Determinants of Health     Financial Resource Strain: Low Risk     Difficulty of Paying Living Expenses: Not hard at all   Food Insecurity: No Food Insecurity    Worried About [address] [employer] in the Last Year: Never true    [facility] in the Last Year: Never true   Transportation Needs:     Lack of Transportation (Medical): Not on file    Lack of Transportation (Non-Medical):  Not on file   Physical Activity:     Days of Exercise per Week: Not on file    Minutes of Exercise per Session: Not on file   Stress:     Feeling of Stress : Not on file   Social Connections:     Frequency of Communication with Friends and Family: Not on file    Frequency of Social Gatherings with Friends and Family: Not on file    Attends Orthodox Services: Not on file    Active Member of Clubs or Organizations: Not on file    Attends Club or Organization Meetings: Not on file    Marital Status: Not on file   Intimate Partner Violence:     Fear of Current or Ex-Partner: Not on file    Emotionally Abused: Not on file    Physically Abused: Not on file    Sexually Abused: Not on file   Housing Stability:     Unable to Pay for Housing in the Last Year: Not on file    Number of Places Lived in the Last Year: Not on file    Unstable Housing in the Last Year: Not on file        /60   Pulse 88   Temp 98 °F (36.7 °C) (Temporal)   Ht 5' 10\" (1.778 m)   Wt 136 lb (61.7 kg)   SpO2 96%   BMI 19.51 kg/m²        Physical Exam:    General appearance - alert, well appearing, and in no distress  Mental Status - alert, oriented to person, place  Eyes - pupils equal and reactive  Ears - bilateral TM's and external ear canals normal   Nose - normal and patent, no erythema, discharge or polyps   Sinuses - Normal paranasal sinuses without tenderness   Throat - mucous membranes moist, pharynx normal without lesions   Neck - supple, no significant adenopathy   Thyroid - thyroid is normal in size without nodules or tenderness    Chest - clear to auscultation, no wheezes, rales or rhonchi, symmetric air entry   Heart - normal rate, regular rhythm, normal S1, S2, no murmurs, rubs, clicks or gallops  Abdomen - soft, nontender, nondistended, no masses or organomegaly   Back exam - full range of motion, no tenderness, palpable spasm or pain on motion   Neurological -delayed speech. Slightly abnormal gait.   Musculoskeletal -curvature of the spine noted.,  Gait impairment  Extremities - peripheral pulses normal, no pedal edema, no clubbing or cyanosis   Skin - normal coloration and turgor, no rashes, no suspicious skin lesions noted    Labs   TSH   Date Value Ref Range Status   06/01/2018 2.060 0.270 - 4.200 uIU/mL Final   01/19/2018 2.770 0.270 - 4.200 uIU/mL Final   03/08/2016 2.900 0.270 - 4.200 uIU/mL Final     TSH   Date Value Ref Range Status   10/15/2020 1.650 0.440 - 3.860 uIU/mL Final   10/03/2019 2.280 0.440 - 3.860 uIU/mL Final   02/14/2019 3.200 0.440 - 3.860 uIU/mL Final     Comment:     Effective:  2/7/2019  New reference range for this analyte has been established.     01/17/2019 2.570 0.270 - 4.200 uIU/mL Final

## 2022-03-08 ENCOUNTER — TELEMEDICINE (OUTPATIENT)
Dept: NEUROLOGY | Age: 27
End: 2022-03-08
Payer: MEDICAID

## 2022-03-08 DIAGNOSIS — I69.30 MULTI INFARCT STATE: ICD-10-CM

## 2022-03-08 DIAGNOSIS — Q04.0 AGENESIS OF CORPUS CALLOSUM (HCC): ICD-10-CM

## 2022-03-08 DIAGNOSIS — H47.033 OPTIC NERVE HYPOPLASIA, BILATERAL: ICD-10-CM

## 2022-03-08 DIAGNOSIS — G40.409 OTHER GENERALIZED EPILEPSY, NOT INTRACTABLE, WITHOUT STATUS EPILEPTICUS (HCC): Primary | ICD-10-CM

## 2022-03-08 DIAGNOSIS — R26.9 ABNORMALITY OF GAIT AND MOBILITY: ICD-10-CM

## 2022-03-08 PROCEDURE — 99213 OFFICE O/P EST LOW 20 MIN: CPT | Performed by: PSYCHIATRY & NEUROLOGY

## 2022-03-08 ASSESSMENT — ENCOUNTER SYMPTOMS
PHOTOPHOBIA: 0
NAUSEA: 0
COLOR CHANGE: 0
TROUBLE SWALLOWING: 0
VOMITING: 0
CHOKING: 0
BACK PAIN: 0
SHORTNESS OF BREATH: 0

## 2022-03-08 NOTE — PROGRESS NOTES
Subjective:      Patient ID: Negin Colbert is a 32 y.o. male who presents today for:  Chief Complaint   Patient presents with    Follow-up     Pts caregiver states that he is doin pretty well. No recent seizures. He has had several behavior problems since he has moved to the [address] home in october 2021. He has since been adjusted with medications and has been doing better but still has some verbal aggression and threats at times and self injury at times. Due to COVID 19 outbreak, patient's office visit was converted to a virtual visit. Patient was contacted and agreed to proceed with a virtual visit via [email or web address] me  The risks and benefits of converting to a virtual visit were discussed in light of the current infectious disease epidemic. Patient also understood that insurance coverage and co-pays are up to their individual insurance plans. 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 Patient continues on Lamictal 150 mg twice a day without recurrent seizures. He has gait ataxia from cerebral palsy with minor tremors. He is on Geodon  40 Intractable from October and settling down. He said some minor agitation but not had any recent hospitalizations. Is not any falls. Past Medical History:   Diagnosis Date    ADHD (attention deficit hyperactivity disorder)     Anxiety     [facility]ile[facility] ([facility] [address]     Myopia     Nystagmus     Optic nerve hypoplasia      No past surgical history on file. Social History     Socioeconomic History    Marital status: Single     Spouse name: Not on file    Number of children: Not on file    Years of education: Not on file    Highest education level: Not on file   Occupational History    Not on file   Tobacco Use    Smoking status: Never Smoker    Smokeless tobacco: Never Used   Substance and Sexual Activity    Alcohol use:  No Alcohol/week: 0.0 standard drinks    Drug use: No    Sexual activity: Not on file   Other Topics Concern    Not on file   Social History Narrative    Not on file     Social Determinants of Health     Financial Resource Strain: Low Risk     Difficulty of Paying Living Expenses: Not hard at all   Food Insecurity: No Food Insecurity    Worried About [address] in the Last Year: Never true    [address] in the Last Year: Never true   Transportation Needs:     Lack of Transportation (Medical): Not on file    Lack of Transportation (Non-Medical): Not on file   Physical Activity:     Days of Exercise per Week: Not on file    Minutes of Exercise per Session: Not on file   Stress:     Feeling of Stress : Not on file   Social Connections:     Frequency of Communication with Friends and Family: Not on file    Frequency of Social Gatherings with Friends and Family: Not on file    Attends Zoroastrian Services: Not on file    Active Member of [address] [place] [address] or Organizations: Not on file    Attends Club or Organization Meetings: Not on file    Marital Status: Not on file   Intimate Partner Violence:     Fear of Current or Ex-Partner: Not on file    Emotionally Abused: Not on file    Physically Abused: Not on file    Sexually Abused: Not on file   Housing Stability:     Unable to Pay for Housing in the Last Year: Not on file    Number of [place] in the Last Year: Not on file    Unstable Housing in the Last Year: Not on file     No family history on file. No Known Allergies    Current Outpatient Medications   Medication Sig Dispense Refill    ziprasidone (GEODON) 20 MG capsule Take 20 mg by mouth in the morning and at bedtime       loperamide (RA ANTI-DIARRHEAL) 2 MG capsule Initial: 4 mg, followed by 2 mg PRN after each loose stool for 3 days. Max: 8 mg in 1 day.  16 capsule 0    carvedilol (COREG) 3.125 MG tablet Take 3.125 mg by mouth 2 times daily      sacubitril-valsartan (ENTRESTO) 24-26 MG per tablet Take 1 tablet by mouth 2 times daily      dapagliflozin (FARXIGA) 10 MG tablet Take 10 mg by mouth every morning      sertraline (ZOLOFT) 50 MG tablet Take 125 mg by mouth daily       spironolactone (ALDACTONE) 25 MG tablet Take 25 mg by mouth daily       cyclobenzaprine (FLEXERIL) 10 MG tablet Take 10 mg by mouth 3 times daily as needed for Muscle spasms      Sodium Phosphates (FLEET) 7-19 GM/118ML Place 1 enema rectally once as needed      magnesium hydroxide (MILK OF MAGNESIA) 400 MG/5ML suspension Take by mouth daily as needed for Constipation       lidocaine (SOLARCAINE COOL ALOE) 0.5 % topical spray Apply topically 3 times daily as needed for Pain      sodium chloride (ALTAMIST SPRAY) 0.65 % nasal spray 1 spray by Nasal route as needed for Congestion 1 Bottle 3    naproxen (NAPROSYN) 500 MG tablet Take 1 tablet by mouth 2 times daily For pain 14 tablet 0    Probiotic Product (PROBIOTIC-10 PO) Take by mouth      lamoTRIgine (LAMICTAL) 150 MG tablet Take 150 mg by mouth 2 times daily      Acetylcysteine (NAC) 600 MG CAPS Take by mouth 2 PO QAM      clonazePAM (KLONOPIN) 1 MG tablet Take 1 tablet by mouth 2 times daily as needed.  90 tablet 2    fluticasone (FLONASE) 50 MCG/ACT nasal spray 1 spray by Each Nostril route daily 3 Bottle 1    acetaminophen (TYLENOL) 325 MG tablet Take 650 mg by mouth every 6 hours as needed for Pain      Docusate Sodium (DULCOLAX STOOL SOFTENER PO) Take 1 Dose by mouth as needed      Handicap Placard MISC by Does not apply route Good from 3/4/20 - 3/4/25 1 each 0    Artificial Tear Solution (GENTEAL TEARS) 0.1-0.2-0.3 % SOLN Apply 1 drop to eye 2 times daily 1 Bottle 3    cetirizine (ZYRTEC ALLERGY) 10 MG tablet Take 1 tablet by mouth daily 30 tablet 5    Cholecalciferol (VITAMIN D) 2000 UNITS CAPS capsule Take by mouth daily      Multiple Vitamin (MULTI VITAMIN DAILY PO) Take by mouth daily      ARIPiprazole (ABILIFY) 5 MG tablet Take 5 mg by mouth daily (Patient not taking: Reported on 3/8/2022)      sertraline (ZOLOFT) 25 MG tablet Take 25 mg by mouth daily (Patient not taking: Reported on 9/1/2021)      Lactobacillus (ACIDOPHILUS PO) Take by mouth (Patient not taking: Reported on 3/8/2022)      Dextromethorphan-guaiFENesin  MG/5ML SYRP Take 5 mLs by mouth every 4 hours as needed for Cough (Patient not taking: Reported on 3/8/2022)      FLUoxetine (PROZAC) 40 MG capsule Take 40 mg by mouth daily  (Patient not taking: Reported on 9/1/2021) 90 capsule 3     No current facility-administered medications for this visit. Review of Systems   Unable to perform ROS: Mental status change   Constitutional: Negative for fever. HENT: Negative for ear pain, tinnitus and trouble swallowing. Eyes: Negative for photophobia and visual disturbance. Respiratory: Negative for choking and shortness of breath. Cardiovascular: Negative for chest pain and palpitations. Gastrointestinal: Negative for nausea and vomiting. Musculoskeletal: Negative for back pain, gait problem, joint swelling, myalgias, neck pain and neck stiffness. Skin: Negative for color change. Allergic/Immunologic: Negative for food allergies. Neurological: Negative for dizziness, tremors, seizures, syncope, facial asymmetry, speech difficulty, weakness, light-headedness, numbness and headaches. Psychiatric/Behavioral: Negative for behavioral problems, confusion, hallucinations and sleep disturbance. Objective: There were no vitals taken for this visit. Physical Exam examination not done    CT ABDOMEN PELVIS W IV CONTRAST Additional Contrast? None    Result Date: 4/9/2021  CT ABDOMEN PELVIS W IV CONTRAST HISTORY:   Bilateral flank and lower back pain. TECHNIQUE: CT of the abdomen and pelvis was performed using standard technique, scanning from just above the dome of the diaphragm to the symphysis pubis. Including delayed images through the kidneys.  Sagittal and coronal reconstructions performed on both phases. Contrast: IV: 100 ml Isovue 300 Oral:  None. All CT scans at this facility use dose modulation, iterative reconstruction, and/or weight based dosing when appropriate to reduce radiation dose to as low as reasonably achievable. COMPARISON: None. RESULT: Liver: No mass or lesion. Biliary: Gallbladder unremarkable. No bile duct dilation. Pancreas: No mass or duct dilation. Spleen: No mass or splenomegaly. Adrenals: No mass. Kidneys: No calculus or hydronephrosis. No suspicious renal lesions. Normal uptake and excretion of contrast into the collecting systems on the delayed phase imaging. GI tract: No dilation or wall thickening. Appendix unremarkable. Lymph nodes: No abdominal or pelvic lymphadenopathy. Mesentery/Peritoneum/Retroperitoneum: No ascites or mass. Vasculature: The celiac axis and SMA are patent. The portal vein and branches, splenic vein, SMV, and hepatic veins are patent. No abdominal aortic or iliac artery aneurysm. Pelvis: No free fluid. No mass. Bladder unremarkable. Bones: No acute osseous findings. No destructive osseous lesions. Soft tissues: Unremarkable. Lower thorax: Unremarkable.      No acute process in the abdomen/pelvis. ==========       Lab Results   Component Value Date    WBC 4.4 04/09/2021    RBC 4.73 04/09/2021    HGB 14.7 04/09/2021    HCT 42.8 04/09/2021    MCV 90.4 04/09/2021    MCH 31.1 04/09/2021    MCHC 34.4 04/09/2021    RDW 13.1 04/09/2021     04/09/2021     Lab Results   Component Value Date     06/24/2021    K 3.6 06/24/2021     06/24/2021    CO2 28 06/24/2021    BUN 14 06/24/2021    CREATININE 0.55 06/24/2021    GFRAA >60.0 06/24/2021    LABGLOM >60.0 06/24/2021    GLUCOSE 73 06/24/2021    GLUCOSE 106 05/28/2021    PROT 6.3 04/09/2021    LABALBU 4.8 06/24/2021    LABALBU 4.9 05/28/2021    CALCIUM 9.7 06/24/2021    BILITOT 0.3 04/09/2021    ALKPHOS 88 04/09/2021    AST 28 04/09/2021    ALT 36 04/09/2021 No results found for: PROTIME, INR  Lab Results   Component Value Date    TSH 1.650 10/15/2020     Lab Results   Component Value Date    TRIG 26 10/03/2019    HDL 56 10/03/2019    LDLCALC 17 10/03/2019     No results found for: Margot Chamorro, LABBENZ, CANNAB, COCAINESCRN, LABMETH, OPIATESCREENURINE, PHENCYCLIDINESCREENURINE, PPXUR, ETOH  No results found for: LITHIUM, DILFRTOT, VALPROATE    Assessment:       Diagnosis Orders   1. Other generalized epilepsy, not intractable, without status epilepticus ([facility] [address]     2. Optic nerve hypoplasia, bilateral     3. Multi infarct state     4. Agenesis of corpus callosum ([facility] [address]     5. 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 The patient has been doing well on Lamictal 150 mg twice a day without any recurrence of seizures. He does have some issues with agitation and is now on Geodon. He is now in a new group home as well. Is not any falls injuries or trauma. We have not seen him for some time and will try and reevaluate him in about 6 months. Evaluation  are from  the caregiver though we did see him on the video walking around      Plan:      No orders of the defined types were placed in this encounter. No orders of the defined types were placed in this encounter. No follow-ups on file.       Chapin Leiva MD

## 2022-03-10 ENCOUNTER — TELEPHONE (OUTPATIENT)
Dept: FAMILY MEDICINE CLINIC | Age: 27
End: 2022-03-10

## 2022-03-10 NOTE — TELEPHONE ENCOUNTER
Does not need an appointment. But please ask if he got clearance from the eye doctor and heart doctor yet? (this was discussed in the last office visit). Thank you.

## 2022-03-10 NOTE — TELEPHONE ENCOUNTER
Grant Fenton is calling from [facility]. He would like to get the Patient entered into the [employer] Olympics. There is a form that needs to be completed. Can the office sign the form, if it is completed by a nurse, or will the patient need an appointment?   Please advise  Grant Fenton [phone]

## 2022-04-21 ENCOUNTER — OFFICE VISIT (OUTPATIENT)
Dept: FAMILY MEDICINE CLINIC | Age: 27
End: 2022-04-21
Payer: MEDICAID

## 2022-04-21 VITALS
SYSTOLIC BLOOD PRESSURE: 112 MMHG | BODY MASS INDEX: 19.96 KG/M2 | HEART RATE: 89 BPM | WEIGHT: 139.4 LBS | HEIGHT: 70 IN | TEMPERATURE: 98 F | DIASTOLIC BLOOD PRESSURE: 78 MMHG | OXYGEN SATURATION: 98 %

## 2022-04-21 DIAGNOSIS — L03.011 PARONYCHIA OF RIGHT THUMB: Primary | ICD-10-CM

## 2022-04-21 PROBLEM — F91.9 DISRUPTIVE BEHAVIOR DISORDER: Status: ACTIVE | Noted: 2022-04-21

## 2022-04-21 PROCEDURE — 99213 OFFICE O/P EST LOW 20 MIN: CPT | Performed by: NURSE PRACTITIONER

## 2022-04-21 RX ORDER — CEPHALEXIN 500 MG/1
500 CAPSULE ORAL 2 TIMES DAILY
Qty: 14 CAPSULE | Refills: 0 | Status: SHIPPED | OUTPATIENT
Start: 2022-04-21 | End: 2022-04-28

## 2022-04-21 ASSESSMENT — ENCOUNTER SYMPTOMS: COLOR CHANGE: 1

## 2022-04-21 NOTE — PATIENT INSTRUCTIONS
Patient Education        Paronychia: Care Instructions  Overview  Paronychia (say \"kvoz-bc-IN-susie-uh\") is an inflammation of the skin around a fingernail or toenail. It happens when germs enter through a break in the skin. If you had an abscess, your doctor may have made a small cut in the infectedarea to drain the pus. Most cases of paronychia improve in a few days. But watch your symptoms and follow your doctor's advice. Though rare, a mild case can turn into something more serious and infect your entire finger or toe. Also, it is possible for aninfection to return. Follow-up care is a key part of your treatment and safety. Be sure to make and go to all appointments, and call your doctor if you are having problems. It's also a good idea to know your test results and keep alist of the medicines you take. How can you care for yourself at home?  If your doctor told you how to care for your infected nail, follow the doctor's instructions. If you did not get instructions, follow this general advice:  ? Wash the area with clean water 2 times a day. Don't use hydrogen peroxide or alcohol, which can slow healing. ? You may cover the area with a thin layer of petroleum jelly, such as Vaseline, and a nonstick bandage. ? Apply more petroleum jelly and replace the bandage as needed.  If your doctor prescribed antibiotics, take them as directed. Do not stop taking them just because you feel better. You need to take the full course of antibiotics.  Take an over-the-counter pain medicine, such as acetaminophen (Tylenol), ibuprofen (Advil, Motrin), or naproxen (Aleve). Read and follow all instructions on the label.  Do not take two or more pain medicines at the same time unless the doctor told you to. Many pain medicines have acetaminophen, which is Tylenol. Too much acetaminophen (Tylenol) can be harmful.  Prop up the toe or finger so that it is higher than the level of your heart.  This will help with pain and swelling.  Apply heat. Put a warm water bottle, heating pad set on low, or warm cloth on your finger or toe. Do not go to sleep with a heating pad on your skin.  Soak the area in warm water twice a day for 15 minutes each time. After soaking, dry the area well and apply a thin layer of petroleum jelly, such as Vaseline. Put on a new bandage. When should you call for help? Call your doctor now or seek immediate medical care if:     You have signs of new or worsening infection, such as:  ? Increased pain, swelling, warmth, or redness. ? Red streaks leading from the infected skin. ? Pus draining from the area. ? A fever. Watch closely for changes in your health, and be sure to contact your doctor if:     You do not get better as expected. Where can you learn more? Go to https://[employer]Stamplay. org and sign in to your digiSchool account. Enter 0911 [id number] [phone] in the Cloudmeter box to learn more about \"Paronychia: Care Instructions. \"     If you do not have an account, please click on the \"Sign Up Now\" link. Current as of: November 15, 2021               Content Version: 13.2  © 1241-9178 [employer], Incorporated. Care instructions adapted under license by [facility] ([facility]). If you have questions about a medical condition or this instruction, always ask your healthcare professional. [place] [address] any warranty or liability for your use of this information.

## 2022-04-21 NOTE — PROGRESS NOTES
Subjective:      Patient ID: Carlos Giang is a 32 y.o. male who presents today for:  Chief Complaint   Patient presents with    Wound Infection     thumb on right hand        HPI Patient presents with open wound on thumb of right hand. Caretaker reports the patient had hangnail which she has been picking at. Patient does admit to chewing on the side of his nail. He is a poor historian due to his cognitive disability. Thumb is erythematous, there is no current drainage as wound is open. Group home is concerned of infection. Patient also handles animals at a nearby farm. Past Medical History:   Diagnosis Date    ADHD (attention deficit hyperactivity disorder)     Anxiety     [facility] ([facility] [address]     Myopia     Nystagmus     Optic nerve hypoplasia      History reviewed. No pertinent surgical history. Social History     Socioeconomic History    Marital status: Single     Spouse name: Not on file    Number of children: Not on file    Years of education: Not on file    Highest education level: Not on file   Occupational History    Not on file   Tobacco Use    Smoking status: Never Smoker    Smokeless tobacco: Never Used   Substance and Sexual Activity    Alcohol use: No     Alcohol/week: 0.0 standard drinks    Drug use: No    Sexual activity: Not on file   Other Topics Concern    Not on file   Social History Narrative    Not on file     Social Determinants of Health     Financial Resource Strain: Low Risk     Difficulty of Paying Living Expenses: Not hard at all   Food Insecurity: No Food Insecurity    Worried About [address] in the Last Year: Never true    [facility] in the Last Year: Never true   Transportation Needs:     Lack of Transportation (Medical): Not on file    Lack of Transportation (Non-Medical):  Not on file   Physical Activity:     Days of Exercise per Week: Not on file    Minutes of Exercise per Session: Not on file   Stress:     Feeling of Stress : Not on file   Social Connections:     Frequency of Communication with Friends and Family: Not on file    Frequency of Social Gatherings with Friends and Family: Not on file    Attends Religion Services: Not on file    Active Member of Clubs or Organizations: Not on file    Attends Club or Organization Meetings: Not on file    Marital Status: Not on file   Intimate Partner Violence:     Fear of Current or Ex-Partner: Not on file    Emotionally Abused: Not on file    Physically Abused: Not on file    Sexually Abused: Not on file   Housing Stability:     Unable to Pay for Housing in the Last Year: Not on file    Number of [place] in the Last Year: Not on file    Unstable Housing in the Last Year: Not on file     History reviewed. No pertinent family history. No Known Allergies  Current Outpatient Medications   Medication Sig Dispense Refill    cephALEXin (KEFLEX) 500 MG capsule Take 1 capsule by mouth 2 times daily for 7 days 14 capsule 0    Bacitracin-Polymyxin B 500-05386 UNIT/GM PADS Apply 1 each topically in the morning and at bedtime 8 each 0    ziprasidone (GEODON) 20 MG capsule Take 20 mg by mouth in the morning and at bedtime       loperamide (RA ANTI-DIARRHEAL) 2 MG capsule Initial: 4 mg, followed by 2 mg PRN after each loose stool for 3 days. Max: 8 mg in 1 day.  16 capsule 0    ARIPiprazole (ABILIFY) 5 MG tablet Take 5 mg by mouth daily       carvedilol (COREG) 3.125 MG tablet Take 3.125 mg by mouth 2 times daily      sacubitril-valsartan (ENTRESTO) 24-26 MG per tablet Take 1 tablet by mouth 2 times daily      dapagliflozin (FARXIGA) 10 MG tablet Take 10 mg by mouth every morning      sertraline (ZOLOFT) 25 MG tablet Take 25 mg by mouth daily       sertraline (ZOLOFT) 50 MG tablet Take 125 mg by mouth daily       spironolactone (ALDACTONE) 25 MG tablet Take 25 mg by mouth daily       Lactobacillus (ACIDOPHILUS PO) Take by mouth       cyclobenzaprine (FLEXERIL) 10 MG tablet Take 10 mg by mouth 3 times daily as needed for Muscle spasms      Sodium Phosphates (FLEET) 7-19 GM/118ML Place 1 enema rectally once as needed      magnesium hydroxide (MILK OF MAGNESIA) 400 MG/5ML suspension Take by mouth daily as needed for Constipation       lidocaine (SOLARCAINE COOL ALOE) 0.5 % topical spray Apply topically 3 times daily as needed for Pain      sodium chloride (ALTAMIST SPRAY) 0.65 % nasal spray 1 spray by Nasal route as needed for Congestion 1 Bottle 3    naproxen (NAPROSYN) 500 MG tablet Take 1 tablet by mouth 2 times daily For pain 14 tablet 0    Probiotic Product (PROBIOTIC-10 PO) Take by mouth      Dextromethorphan-guaiFENesin  MG/5ML SYRP Take 5 mLs by mouth every 4 hours as needed for Cough       lamoTRIgine (LAMICTAL) 150 MG tablet Take 150 mg by mouth 2 times daily      Acetylcysteine (NAC) 600 MG CAPS Take by mouth 2 PO QAM      clonazePAM (KLONOPIN) 1 MG tablet Take 1 tablet by mouth 2 times daily as needed. 90 tablet 2    fluticasone (FLONASE) 50 MCG/ACT nasal spray 1 spray by Each Nostril route daily 3 Bottle 1    acetaminophen (TYLENOL) 325 MG tablet Take 650 mg by mouth every 6 hours as needed for Pain      Docusate Sodium (DULCOLAX STOOL SOFTENER PO) Take 1 Dose by mouth as needed      Handicap Placard MISC by Does not apply route Good from 3/4/20 - 3/4/25 1 each 0    FLUoxetine (PROZAC) 40 MG capsule Take 40 mg by mouth daily  90 capsule 3    Artificial Tear Solution (GENTEAL TEARS) 0.1-0.2-0.3 % SOLN Apply 1 drop to eye 2 times daily 1 Bottle 3    cetirizine (ZYRTEC ALLERGY) 10 MG tablet Take 1 tablet by mouth daily 30 tablet 5    Cholecalciferol (VITAMIN D) 2000 UNITS CAPS capsule Take by mouth daily      Multiple Vitamin (MULTI VITAMIN DAILY PO) Take by mouth daily       No current facility-administered medications for this visit. Review of Systems   Skin: Positive for color change and wound.    All other systems reviewed and are negative. Objective:   /78   Pulse 89   Temp 98 °F (36.7 °C)   Ht 5' 10\" (1.778 m)   Wt 139 lb 6.4 oz (63.2 kg)   SpO2 98%   BMI 20.00 kg/m²     Physical Exam  Constitutional:       General: He is not in acute distress. Appearance: He is well-developed. HENT:      Head: Normocephalic and atraumatic. Nose: Nose normal.   Eyes:      Pupils: Pupils are equal, round, and reactive to light. Cardiovascular:      Rate and Rhythm: Normal rate and regular rhythm. Heart sounds: Normal heart sounds. Pulmonary:      Effort: Pulmonary effort is normal.      Breath sounds: Normal breath sounds. Abdominal:      General: Bowel sounds are normal.      Palpations: Abdomen is soft. Musculoskeletal:         General: Normal range of motion. Cervical back: Normal range of motion and neck supple. Skin:     General: Skin is warm and dry. Capillary Refill: Capillary refill takes less than 2 seconds. Findings: Erythema and lesion present. Comments: Open wound to right thumb   Neurological:      Mental Status: He is alert and oriented to person, place, and time. Psychiatric:         Behavior: Behavior normal.         Assessment:      No results found for this visit on 04/21/22. Plan:     Assessment & Plan   Nadine Bronson was seen today for wound infection. Diagnoses and all orders for this visit:    Paronychia of right thumb    Other orders  -     cephALEXin (KEFLEX) 500 MG capsule; Take 1 capsule by mouth 2 times daily for 7 days  -     Bacitracin-Polymyxin B 500-10589 UNIT/GM PADS; Apply 1 each topically in the morning and at bedtime      No orders of the defined types were placed in this encounter.     Orders Placed This Encounter   Medications    cephALEXin (KEFLEX) 500 MG capsule     Sig: Take 1 capsule by mouth 2 times daily for 7 days     Dispense:  14 capsule     Refill:  0    Bacitracin-Polymyxin B 500-26276 UNIT/GM PADS     Sig: Apply 1 each topically in the morning and at bedtime     Dispense:  8 each     Refill:  0     There are no discontinued medications. Return if symptoms worsen or fail to improve, for follow up with PCP. Thumb cleaned and dressed. Reviewed with the patient/family: current clinical status & medications. Side effects of the medication prescribed today, as well as treatment plan/rationale and result expectations have been discussed with the patient/family who expresses understanding. Patient will be discharged home in stable condition. Follow up with PCP to evaluate treatment results or return if symptoms worsen or fail to improve. Discussed signs and symptoms which require immediate follow-up in ED/call to 911. Understanding verbalized. I have reviewed the patient's medical history in detail and updated the computerized patient record.     Panda Olvera, TAZ - CNP

## 2022-06-21 ENCOUNTER — HOSPITAL ENCOUNTER (EMERGENCY)
Age: 27
Discharge: HOME | End: 2022-06-21
Attending: EMERGENCY MEDICINE
Payer: MEDICAID

## 2022-06-21 VITALS
RESPIRATION RATE: 18 BRPM | DIASTOLIC BLOOD PRESSURE: 84 MMHG | HEART RATE: 84 BPM | TEMPERATURE: 98.4 F | SYSTOLIC BLOOD PRESSURE: 122 MMHG | BODY MASS INDEX: 18.83 KG/M2 | OXYGEN SATURATION: 99 % | WEIGHT: 120 LBS | HEIGHT: 67 IN

## 2022-06-21 DIAGNOSIS — L30.9 DERMATITIS: Primary | ICD-10-CM

## 2022-06-21 PROCEDURE — 6370000000 HC RX 637 (ALT 250 FOR IP): Performed by: EMERGENCY MEDICINE

## 2022-06-21 PROCEDURE — 99283 EMERGENCY DEPT VISIT LOW MDM: CPT

## 2022-06-21 RX ORDER — CLOTRIMAZOLE AND BETAMETHASONE DIPROPIONATE 10; .64 MG/G; MG/G
CREAM TOPICAL 2 TIMES DAILY
Qty: 15 G | Refills: 0 | Status: SHIPPED | OUTPATIENT
Start: 2022-06-21 | End: 2022-07-13 | Stop reason: SDUPTHER

## 2022-06-21 RX ORDER — PREDNISONE 20 MG/1
40 TABLET ORAL ONCE
Status: COMPLETED | OUTPATIENT
Start: 2022-06-21 | End: 2022-06-21

## 2022-06-21 RX ADMIN — PREDNISONE 40 MG: 20 TABLET ORAL at 10:03
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
## 2022-07-13 ENCOUNTER — OFFICE VISIT (OUTPATIENT)
Dept: FAMILY MEDICINE CLINIC | Age: 27
End: 2022-07-13
Payer: MEDICAID

## 2022-07-13 VITALS
HEART RATE: 81 BPM | DIASTOLIC BLOOD PRESSURE: 62 MMHG | BODY MASS INDEX: 18.83 KG/M2 | SYSTOLIC BLOOD PRESSURE: 100 MMHG | WEIGHT: 120 LBS | OXYGEN SATURATION: 98 % | HEIGHT: 67 IN | TEMPERATURE: 98 F

## 2022-07-13 DIAGNOSIS — L30.1 DYSHIDROTIC ECZEMA: Primary | ICD-10-CM

## 2022-07-13 PROCEDURE — 99213 OFFICE O/P EST LOW 20 MIN: CPT | Performed by: PHYSICIAN ASSISTANT

## 2022-07-13 RX ORDER — ACETAMINOPHEN 160 MG
TABLET,DISINTEGRATING ORAL
COMMUNITY

## 2022-07-13 RX ORDER — CLOTRIMAZOLE AND BETAMETHASONE DIPROPIONATE 10; .64 MG/G; MG/G
CREAM TOPICAL 2 TIMES DAILY
Qty: 15 G | Refills: 0 | Status: SHIPPED | OUTPATIENT
Start: 2022-07-13 | End: 2022-07-20

## 2022-07-13 ASSESSMENT — PATIENT HEALTH QUESTIONNAIRE - PHQ9
SUM OF ALL RESPONSES TO PHQ QUESTIONS 1-9: 0
SUM OF ALL RESPONSES TO PHQ QUESTIONS 1-9: 0
2. FEELING DOWN, DEPRESSED OR HOPELESS: 0
SUM OF ALL RESPONSES TO PHQ QUESTIONS 1-9: 0
SUM OF ALL RESPONSES TO PHQ9 QUESTIONS 1 & 2: 0
1. LITTLE INTEREST OR PLEASURE IN DOING THINGS: 0
SUM OF ALL RESPONSES TO PHQ QUESTIONS 1-9: 0

## 2022-07-15 ENCOUNTER — OFFICE VISIT (OUTPATIENT)
Dept: FAMILY MEDICINE CLINIC | Age: 27
End: 2022-07-15
Payer: MEDICAID

## 2022-07-15 VITALS
SYSTOLIC BLOOD PRESSURE: 110 MMHG | HEART RATE: 89 BPM | DIASTOLIC BLOOD PRESSURE: 68 MMHG | HEIGHT: 67 IN | OXYGEN SATURATION: 97 % | BODY MASS INDEX: 18.83 KG/M2 | TEMPERATURE: 97.7 F | WEIGHT: 120 LBS

## 2022-07-15 DIAGNOSIS — L30.9 HAND DERMATITIS: Primary | ICD-10-CM

## 2022-07-15 PROCEDURE — 99213 OFFICE O/P EST LOW 20 MIN: CPT | Performed by: FAMILY MEDICINE

## 2022-07-15 RX ORDER — DIPHENHYDRAMINE HCL 12.5MG/5ML
12.5 LIQUID (ML) ORAL 4 TIMES DAILY PRN
Qty: 118 ML | Refills: 0 | Status: SHIPPED | OUTPATIENT
Start: 2022-07-15

## 2022-07-15 NOTE — PROGRESS NOTES
Chief Complaint   Patient presents with    Rash     Rash on right hand & painful. Saw urgent care but no relief. Itchy & spreading. Denied being outside, nurse aid states it happened over night        HPI: Rosalie Romero 32 y.o. male presenting for       Dermatitis   On the hands  Was seen and tried different medications with no relief of symptoms   Admits to picking to the area. Review of Systems   Constitutional: Negative for activity change, appetite change, diaphoresis and fatigue. HENT: Negative for congestion, dental problem, facial swelling, mouth sores, sinus pain. Admits to sneezing. Eyes: Negative for photophobia, discharge, redness and itching. Respiratory: Negative for apnea, cough, chest tightness, shortness of breath and stridor. Cardiovascular: Negative for chest pain, palpitations and leg swelling. Gastrointestinal: Negative for abdominal distention, anal bleeding, blood in stool and constipation. Endocrine: Negative for cold intolerance, heat intolerance and polyphagia. Genitourinary: Negative for difficulty urinating, discharge, dysuria, frequency, genital sores, penile swelling and testicular pain. Musculoskeletal: Positive for gait problem. Negative for arthralgias, back pain and myalgias. Skin: Negative for color change, pallor, rash and wound. Allergic/Immunologic: Negative for environmental allergies and food allergies. Neurological: Negative for dizziness, syncope, facial asymmetry, weakness and numbness. Psychiatric/Behavioral: Negative for agitation, dysphoric mood and hallucinations. The patient is not hyperactive. Current Outpatient Medications   Medication Sig Dispense Refill    Cholecalciferol (VITAMIN D3) 50 MCG (2000 UT) CAPS Take by mouth      clotrimazole-betamethasone (LOTRISONE) 1-0.05 % cream Apply topically 2 times daily for 7 days Apply topically 2 times daily.  15 g 0    ziprasidone (GEODON) 20 MG capsule Take 20 mg by mouth in the morning and at bedtime       loperamide (RA ANTI-DIARRHEAL) 2 MG capsule Initial: 4 mg, followed by 2 mg PRN after each loose stool for 3 days. Max: 8 mg in 1 day. 16 capsule 0    carvedilol (COREG) 3.125 MG tablet Take 3.125 mg by mouth 2 times daily      sacubitril-valsartan (ENTRESTO) 24-26 MG per tablet Take 1 tablet by mouth 2 times daily      dapagliflozin (FARXIGA) 10 MG tablet Take 10 mg by mouth every morning      sertraline (ZOLOFT) 25 MG tablet Take 25 mg by mouth daily       sertraline (ZOLOFT) 50 MG tablet Take 100 mg by mouth daily       spironolactone (ALDACTONE) 25 MG tablet Take 25 mg by mouth daily       Lactobacillus (ACIDOPHILUS PO) Take by mouth       cyclobenzaprine (FLEXERIL) 10 MG tablet Take 10 mg by mouth 3 times daily as needed for Muscle spasms      magnesium hydroxide (MILK OF MAGNESIA) 400 MG/5ML suspension Take by mouth daily as needed for Constipation       lidocaine (SOLARCAINE COOL ALOE) 0.5 % topical spray Apply topically 3 times daily as needed for Pain      sodium chloride (ALTAMIST SPRAY) 0.65 % nasal spray 1 spray by Nasal route as needed for Congestion 1 Bottle 3    naproxen (NAPROSYN) 500 MG tablet Take 1 tablet by mouth 2 times daily For pain 14 tablet 0    Probiotic Product (PROBIOTIC-10 PO) Take by mouth      Dextromethorphan-guaiFENesin  MG/5ML SYRP Take 5 mLs by mouth every 4 hours as needed for Cough       lamoTRIgine (LAMICTAL) 150 MG tablet Take 150 mg by mouth 2 times daily      clonazePAM (KLONOPIN) 1 MG tablet Take 1 tablet by mouth 2 times daily as needed.  90 tablet 2    fluticasone (FLONASE) 50 MCG/ACT nasal spray 1 spray by Each Nostril route daily 3 Bottle 1    acetaminophen (TYLENOL) 325 MG tablet Take 650 mg by mouth every 6 hours as needed for Pain      Docusate Sodium (DULCOLAX STOOL SOFTENER PO) Take 1 Dose by mouth as needed      Handicap Placard MISC by Does not apply route Good from 3/4/20 - 3/4/25 1 each 0    Artificial Tear Solution (GENTEAL TEARS) 0.1-0.2-0.3 % SOLN Apply 1 drop to eye 2 times daily 1 Bottle 3    cetirizine (ZYRTEC ALLERGY) 10 MG tablet Take 1 tablet by mouth daily 30 tablet 5    Multiple Vitamin (MULTI VITAMIN DAILY PO) Take by mouth daily      Bacitracin-Polymyxin B [id number] UNIT/GM PADS Apply 1 each topically in the morning and at bedtime (Patient not taking: No sig reported) 8 each 0    ARIPiprazole (ABILIFY) 5 MG tablet Take 5 mg by mouth daily  (Patient not taking: No sig reported)      Sodium Phosphates (FLEET) 7-19 GM/118ML Place 1 enema rectally once as needed (Patient not taking: No sig reported)      Acetylcysteine (NAC) 600 MG CAPS Take by mouth 2 PO QAM (Patient not taking: No sig reported)      FLUoxetine (PROZAC) 40 MG capsule Take 40 mg by mouth daily  (Patient not taking: No sig reported) 90 capsule 3     No current facility-administered medications for this visit. Past Medical History:   Diagnosis Date    ADHD (attention deficit hyperactivity disorder)     Anxiety     Epilepsy ([facility] [address]     Myopia     Nystagmus     Optic nerve hypoplasia         No past surgical history on file. No family history on file.      Social History     Socioeconomic History    Marital status: Single     Spouse name: Not on file    Number of children: Not on file    Years of education: Not on file    Highest education level: Not on file   Occupational History    Not on file   Tobacco Use    Smoking status: Never    Smokeless tobacco: Never   Vaping Use    Vaping Use: Never used   Substance and Sexual Activity    Alcohol use: No     Alcohol/week: 0.0 standard drinks    Drug use: No    Sexual activity: Not Currently   Other Topics Concern    Not on file   Social History Narrative    Not on file     Social Determinants of Health     Financial Resource Strain: Low Risk     Difficulty of Paying Living Expenses: Not hard at all   Food Insecurity: No Food Insecurity    Worried About [address] [employer] in the Last Year: Never true    Ran Out of Food in the Last Year: Never true   Transportation Needs: Not on file   Physical Activity: Not on file   Stress: Not on file   Social Connections: Not on file   Intimate Partner Violence: Not on file   Housing Stability: Not on file        /68   Pulse 89   Temp 97.7 °F (36.5 °C) (Temporal)   Ht 5' 7\" (1.702 m)   Wt 120 lb (54.4 kg)   SpO2 97%   BMI 18.79 kg/m²        Physical Exam:    General appearance - alert, well appearing, and in no distress  Mental Status - alert, oriented to person, place  Eyes - pupils equal and reactive  Ears - bilateral TM's and external ear canals normal   Nose - normal and patent, no erythema, discharge or polyps   Sinuses - Normal paranasal sinuses without tenderness   Throat - mucous membranes moist, pharynx normal without lesions   Neck - supple, no significant adenopathy   Thyroid - thyroid is normal in size without nodules or tenderness    Chest - clear to auscultation, no wheezes, rales or rhonchi, symmetric air entry   Heart - normal rate, regular rhythm, normal S1, S2, no murmurs, rubs, clicks or gallops  Abdomen - soft, nontender, nondistended, no masses or organomegaly   Back exam - full range of motion, no tenderness, palpable spasm or pain on motion   Neurological -delayed speech. Slightly abnormal gait. Musculoskeletal -curvature of the spine noted.,  Gait impairment  Extremities - peripheral pulses normal, no pedal edema, no clubbing or cyanosis   Skin -  scaly dry rash on the palm of the hand. No discharge. No bleeding. Nontender.       Labs   TSH   Date Value Ref Range Status   06/01/2018 2.060 0.270 - 4.200 uIU/mL Final   01/19/2018 2.770 0.270 - 4.200 uIU/mL Final   03/08/2016 2.900 0.270 - 4.200 uIU/mL Final     TSH   Date Value Ref Range Status   10/15/2020 1.650 0.440 - 3.860 uIU/mL Final   10/03/2019 2.280 0.440 - 3.860 uIU/mL Final   02/14/2019 3.200 0.440 - 3.860 uIU/mL Final     Comment:     Effective:  2/7/2019  New reference range for this analyte has been established.     01/17/2019 2.570 0.270 - 4.200 uIU/mL Final   04/20/2018 2.200 0.270 - 4.200 uIU/mL Final   06/02/2017 2.070 0.270 - 4.200 uIU/mL Final   12/27/2016 5.620 uIU/mL      Lab Results   Component Value Date     07/04/2022    K 3.8 07/04/2022     07/04/2022    CO2 28 06/24/2021    BUN 14 06/24/2021    CREATININE 0.90 07/04/2022    GLUCOSE 107 (H) 07/04/2022    CALCIUM 9.2 07/04/2022    PROT 6.9 07/04/2022    LABALBU 4.5 07/04/2022    BILITOT 0.6 07/04/2022    ALKPHOS 79 07/04/2022    AST 21 07/04/2022    ALT 21 07/04/2022    LABGLOM >60.0 06/24/2021    GFRAA >60.0 06/24/2021    GLOB 2.1 (L) 04/09/2021       Lab Results   Component Value Date    WBC 4.6 07/04/2022    HGB 14.8 07/04/2022    HCT 42.8 07/04/2022    MCV 87 07/04/2022     07/04/2022     No results found for: LABA1C  No results found for: EAG    Reviewed the notes from the endocrinologist and reviewed it with the group home and patient. A/P: [place] Cargo 32 y.o. male presenting for    1. Hand dermatitis  Patient has been on multiple agents. No relief. Patinet is constantly picking at the area which is not allowing it to heal. Put a barrier on the area to prevent patient from picking at it. No need for abx at this time. - Amb External Referral To Dermatology  - diphenhydrAMINE (BENADRYL) 12.5 MG/5ML elixir; Take 5 mLs by mouth 4 times daily as needed for Itching  Dispense: 118 mL; Refill: 0            Patient can follow-up in 6 months for routine labs as well as health issues. Please note, this report has been partially produced using speech recognition software  and may cause  and /or contain errors related to that system including grammar, punctuation and spelling as well as words and phrases that may seem inappropriate. If there are questions or concerns please feel free to contact me to clarify.

## 2022-08-31 PROBLEM — F69 BEHAVIOR CONCERN IN ADULT: Status: ACTIVE | Noted: 2022-08-31

## 2022-09-01 ENCOUNTER — TELEPHONE (OUTPATIENT)
Dept: FAMILY MEDICINE CLINIC | Age: 27
End: 2022-09-01

## 2022-09-01 PROBLEM — G40.409 OTHER GENERALIZED EPILEPSY AND EPILEPTIC SYNDROMES, NOT INTRACTABLE, WITHOUT STATUS EPILEPTICUS (HCC): Status: ACTIVE | Noted: 2022-09-01

## 2022-09-01 NOTE — TELEPHONE ENCOUNTER
----- Message from Kenya Perla sent at 9/1/2022 10:26 AM EDT -----  Subject: Referral Request    Reason for referral request? Nursing supervisor, Prashanth Best, at [facility]   called on behalf of the pt. She stated the pt has been having night time   urinary incontinence and would like for an order for a urine sample to be   placed. She would also like to know if the pt's Intresto could be causing   this urinary incontinence at night. Provider patient wants to be referred to(if known):     Provider Phone Number(if known): Additional Information for Provider?  Call Prashanth Best to confirm order has been   placed and to answer questions  ---------------------------------------------------------------------------  --------------  CALL BACK INFO    [phone]; OK to leave message on voicemail  ---------------------------------------------------------------------------  --------------

## 2022-09-02 ENCOUNTER — TELEPHONE (OUTPATIENT)
Dept: FAMILY MEDICINE CLINIC | Age: 27
End: 2022-09-02

## 2022-09-02 DIAGNOSIS — R32 URINARY INCONTINENCE, UNSPECIFIED TYPE: Primary | ICD-10-CM

## 2022-09-06 ENCOUNTER — OFFICE VISIT (OUTPATIENT)
Dept: FAMILY MEDICINE CLINIC | Age: 27
End: 2022-09-06
Payer: MEDICAID

## 2022-09-06 VITALS
WEIGHT: 140 LBS | SYSTOLIC BLOOD PRESSURE: 100 MMHG | HEART RATE: 85 BPM | TEMPERATURE: 97.5 F | DIASTOLIC BLOOD PRESSURE: 60 MMHG | BODY MASS INDEX: 21.93 KG/M2 | OXYGEN SATURATION: 97 %

## 2022-09-06 DIAGNOSIS — H52.203 MYOPIA OF BOTH EYES WITH ASTIGMATISM: ICD-10-CM

## 2022-09-06 DIAGNOSIS — R26.9 ABNORMALITY OF GAIT AND MOBILITY: ICD-10-CM

## 2022-09-06 DIAGNOSIS — H52.13 MYOPIA OF BOTH EYES WITH ASTIGMATISM: ICD-10-CM

## 2022-09-06 DIAGNOSIS — H54.8 LEGAL BLINDNESS, AS DEFINED IN USA: ICD-10-CM

## 2022-09-06 DIAGNOSIS — F79 INTELLECTUAL DISABILITY: ICD-10-CM

## 2022-09-06 DIAGNOSIS — G40.909 NONINTRACTABLE EPILEPSY WITHOUT STATUS EPILEPTICUS, UNSPECIFIED EPILEPSY TYPE (HCC): ICD-10-CM

## 2022-09-06 DIAGNOSIS — F41.9 ANXIETY: ICD-10-CM

## 2022-09-06 DIAGNOSIS — L30.9 HAND DERMATITIS: Primary | ICD-10-CM

## 2022-09-06 PROCEDURE — 99213 OFFICE O/P EST LOW 20 MIN: CPT | Performed by: FAMILY MEDICINE

## 2022-09-06 NOTE — PROGRESS NOTES
Chief Complaint   Patient presents with    1 Month Follow-Up    ADHD    Anxiety        HPI: Toya Shoemaker 32 y.o. male presenting for     Patient moved to another group home   [address] group home  (still apart of echoing)     Hyperprolactinemia  Was seeing endo in the past   No need to follow up   Had an MRI     Has a cardiologist   Has a heart condition-had testing through cardiology 3 . 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 Myopia in both eyes with astigmatitism   Stable   Recently got new glasses     Anxiety/ADHD  Per group home, patient has been experiencing more agitation in the morning. Psychiatry has him on his Klonopin twice a day but is unsure if it could be changed to 3 times a day. As you may recall, patient has a history of anxiety, ADHD. This is managed by a psychiatrist.  Patient was recently taken off of Risperdal after series of labs that were obtained showed that he has an elevated prolactin level. Patient was referred to endocrinology for further evaluation. Per patient, he has noticed milk expression from the breast.  Denies any recent change in his vision. Patient denies any fevers, chills, nausea, vomiting, chest pain, shortness of breath, abdominal pain, change in nation, change in stools. Patient has not been able to establish care with endocrinologist due to the coronavirus pandemic. F/u   Admits to more anxiety. Admits to self-injury  Denies any SI or HI   Sees once to twice a month       Posture and gait instability  Stable at this time. When he walks he trips. Has been going for 5 years. History of hyperprolactinemia  Patient was following endocrinology. Repeat prolactin levels were normal.  As result his MRI was canceled and was to continue to monitor at this time. Review of Systems   Constitutional: Negative for activity change, appetite change, diaphoresis and fatigue.    HENT: Negative for congestion, dental problem, facial swelling, mouth sores, sinus pain. Admits to sneezing. Eyes: Negative for photophobia, discharge, redness and itching. Respiratory: Negative for apnea, cough, chest tightness, shortness of breath and stridor. Cardiovascular: Negative for chest pain, palpitations and leg swelling. Gastrointestinal: Negative for abdominal distention, anal bleeding, blood in stool and constipation. Endocrine: Negative for cold intolerance, heat intolerance and polyphagia. Genitourinary: Negative for difficulty urinating, discharge, dysuria, frequency, genital sores, penile swelling and testicular pain. Musculoskeletal: Positive for gait problem. Negative for arthralgias, back pain and myalgias. Skin: Negative for color change, pallor, rash and wound. Allergic/Immunologic: Negative for environmental allergies and food allergies. Neurological: Negative for dizziness, syncope, facial asymmetry, weakness and numbness. Psychiatric/Behavioral: Negative for agitation, dysphoric mood and hallucinations. The patient is not hyperactive. Current Outpatient Medications   Medication Sig Dispense Refill    diphenhydrAMINE (BENADRYL) 12.5 MG/5ML elixir Take 5 mLs by mouth 4 times daily as needed for Itching (Patient not taking: Reported on 9/12/2022) 118 mL 0    Cholecalciferol (VITAMIN D3) 50 MCG (2000 UT) CAPS Take by mouth      ziprasidone (GEODON) 20 MG capsule Take 20 mg by mouth in the morning and at bedtime  (Patient not taking: Reported on 9/12/2022)      loperamide (RA ANTI-DIARRHEAL) 2 MG capsule Initial: 4 mg, followed by 2 mg PRN after each loose stool for 3 days. Max: 8 mg in 1 day.  16 capsule 0    carvedilol (COREG) 3.125 MG tablet Take 3.125 mg by mouth 2 times daily      sacubitril-valsartan (ENTRESTO) 24-26 MG per tablet Take 1 tablet by mouth 2 times daily      dapagliflozin (FARXIGA) 10 MG tablet Take 10 mg by mouth every morning      sertraline (ZOLOFT) 25 MG tablet Take 25 mg by mouth daily       sertraline (ZOLOFT) 50 MG tablet Take 100 mg by mouth daily       spironolactone (ALDACTONE) 25 MG tablet Take 25 mg by mouth daily       Lactobacillus (ACIDOPHILUS PO) Take by mouth       cyclobenzaprine (FLEXERIL) 10 MG tablet Take 10 mg by mouth 3 times daily as needed for Muscle spasms      magnesium hydroxide (MILK OF MAGNESIA) 400 MG/5ML suspension Take by mouth daily as needed for Constipation       lidocaine (SOLARCAINE COOL ALOE) 0.5 % topical spray Apply topically 3 times daily as needed for Pain      sodium chloride (ALTAMIST SPRAY) 0.65 % nasal spray 1 spray by Nasal route as needed for Congestion 1 Bottle 3    naproxen (NAPROSYN) 500 MG tablet Take 1 tablet by mouth 2 times daily For pain 14 tablet 0    Probiotic Product (PROBIOTIC-10 PO) Take by mouth      Dextromethorphan-guaiFENesin  MG/5ML SYRP Take 5 mLs by mouth every 4 hours as needed for Cough  (Patient not taking: Reported on 9/12/2022)      lamoTRIgine (LAMICTAL) 150 MG tablet Take 150 mg by mouth 2 times daily      clonazePAM (KLONOPIN) 1 MG tablet Take 1 tablet by mouth 2 times daily as needed.  90 tablet 2    fluticasone (FLONASE) 50 MCG/ACT nasal spray 1 spray by Each Nostril route daily 3 Bottle 1    acetaminophen (TYLENOL) 325 MG tablet Take 650 mg by mouth every 6 hours as needed for Pain      Docusate Sodium (DULCOLAX STOOL SOFTENER PO) Take 1 Dose by mouth as needed (Patient not taking: Reported on 9/12/2022)      Handicap Placard MISC by Does not apply route Good from 3/4/20 - 3/4/25 1 each 0    Artificial Tear Solution (GENTEAL TEARS) 0.1-0.2-0.3 % SOLN Apply 1 drop to eye 2 times daily 1 Bottle 3    cetirizine (ZYRTEC ALLERGY) 10 MG tablet Take 1 tablet by mouth daily 30 tablet 5    Multiple Vitamin (MULTI VITAMIN DAILY PO) Take by mouth daily      risperiDONE (RISPERDAL) 3 MG tablet Take 3 mg by mouth 2 times daily      Bacitracin-Polymyxin B [id number] UNIT/GM PADS Apply 1 each topically in the morning and at bedtime (Patient not taking: No sig reported) 8 each 0    ARIPiprazole (ABILIFY) 5 MG tablet Take 5 mg by mouth daily  (Patient not taking: No sig reported)      Sodium Phosphates (FLEET) 7-19 GM/118ML Place 1 enema rectally once as needed (Patient not taking: No sig reported)      Acetylcysteine (NAC) 600 MG CAPS Take by mouth 2 PO QAM (Patient not taking: No sig reported)      FLUoxetine (PROZAC) 40 MG capsule Take 40 mg by mouth daily  (Patient not taking: No sig reported) 90 capsule 3     No current facility-administered medications for this visit. Past Medical History:   Diagnosis Date    ADHD (attention deficit hyperactivity disorder)     Anxiety     Epilepsy ([facility] [address]     Myopia     Nystagmus     Optic nerve hypoplasia         No past surgical history on file. No family history on file.      Social History     Socioeconomic History    Marital status: Single     Spouse name: Not on file    Number of children: Not on file    Years of education: Not on file    Highest education level: Not on file   Occupational History    Not on file   Tobacco Use    Smoking status: Never    Smokeless tobacco: Never   Vaping Use    Vaping Use: Never used   Substance and Sexual Activity    Alcohol use: No     Alcohol/week: 0.0 standard drinks    Drug use: No    Sexual activity: Not Currently   Other Topics Concern    Not on file   Social History Narrative    Not on file     Social Determinants of Health     Financial Resource Strain: Low Risk     Difficulty of Paying Living Expenses: Not hard at all   Food Insecurity: No Food Insecurity    Worried About Running Out of Food in the Last Year: Never true    Ran Out of Food in the Last Year: Never true   Transportation Needs: Not on file   Physical Activity: Not on file   Stress: Not on file   Social Connections: Not on file   Intimate Partner Violence: Not on file   Housing Stability: Not on file        /60   Pulse 85   Temp 97.5 °F (36.4 °C) (Temporal)   Wt 140 lb (63.5 kg)   SpO2 97%   BMI 21.93 kg/m²        Physical Exam:    General appearance - alert, well appearing, and in no distress  Mental Status - alert, oriented to person, place  Eyes - pupils equal and reactive  Ears - bilateral TM's and external ear canals normal   Nose - normal and patent, no erythema, discharge or polyps   Sinuses - Normal paranasal sinuses without tenderness   Throat - mucous membranes moist, pharynx normal without lesions   Neck - supple, no significant adenopathy   Thyroid - thyroid is normal in size without nodules or tenderness    Chest - clear to auscultation, no wheezes, rales or rhonchi, symmetric air entry   Heart - normal rate, regular rhythm, normal S1, S2, no murmurs, rubs, clicks or gallops  Abdomen - soft, nontender, nondistended, no masses or organomegaly   Back exam - full range of motion, no tenderness, palpable spasm or pain on motion   Neurological -delayed speech. Slightly abnormal gait.   Musculoskeletal -curvature of the spine noted.,  Gait impairment  Extremities - peripheral pulses normal, no pedal edema, no clubbing or cyanosis   Skin - normal coloration and turgor, no rashes, no suspicious skin lesions noted    Labs   TSH   Date Value Ref Range Status   06/01/2018 2.060 0.270 - 4.200 uIU/mL Final   01/19/2018 2.770 0.270 - 4.200 uIU/mL Final   03/08/2016 2.900 0.270 - 4.200 uIU/mL Final     TSH   Date Value Ref Range Status   10/15/2020 1.650 0.440 - 3.860 uIU/mL Final   10/03/2019 2.280 0.440 - 3.860 uIU/mL Final   02/14/2019 3.200 0.440 - 3.860 uIU/mL Final     Comment:     Effective:  2/7/2019  New reference range for this analyte has been established.     01/17/2019 2.570 0.270 - 4.200 uIU/mL Final   04/20/2018 2.200 0.270 - 4.200 uIU/mL Final   06/02/2017 2.070 0.270 - 4.200 uIU/mL Final   12/27/2016 5.620 uIU/mL      Lab Results   Component Value Date     07/04/2022    K 3.8 07/04/2022     07/04/2022    CO2 28 06/24/2021    BUN 14 06/24/2021 CREATININE 0.90 07/04/2022    GLUCOSE 107 (H) 07/04/2022    CALCIUM 9.2 07/04/2022    PROT 6.9 07/04/2022    LABALBU 4.5 07/04/2022    BILITOT 0.6 07/04/2022    ALKPHOS 79 07/04/2022    AST 21 07/04/2022    ALT 21 07/04/2022    LABGLOM >60.0 06/24/2021    GFRAA >60.0 06/24/2021    GLOB 2.1 (L) 04/09/2021       Lab Results   Component Value Date    WBC 4.6 07/04/2022    HGB 14.8 07/04/2022    HCT 42.8 07/04/2022    MCV 87 07/04/2022     07/04/2022     No results found for: LABA1C  No results found for: EAG    Reviewed the notes from the endocrinologist and reviewed it with the group home and patient. A/P: Mirela Alvarado 32 y.o. male presenting for    1. Hand dermatitis  Still an issue   Has improved when area Is covered. Will see the dermatologist tomorrow     2. Anxiety  Stable     3. Intellectual disability      4. Lives in group home      5. Myopia of both eyes with astigmatism      6. Legal blindness, as defined in [place]      7. Abnormality of gait and mobility      8. Nonintractable epilepsy without status epilepticus, unspecified epilepsy type [facility])            Patient can follow-up in 6 months for routine labs as well as health issues. Please note, this report has been partially produced using speech recognition software  and may cause  and /or contain errors related to that system including grammar, punctuation and spelling as well as words and phrases that may seem inappropriate. If there are questions or concerns please feel free to contact me to clarify.

## 2022-09-12 ENCOUNTER — OFFICE VISIT (OUTPATIENT)
Dept: NEUROLOGY | Age: 27
End: 2022-09-12
Payer: MEDICAID

## 2022-09-12 VITALS
BODY MASS INDEX: 22.16 KG/M2 | SYSTOLIC BLOOD PRESSURE: 100 MMHG | HEART RATE: 76 BPM | WEIGHT: 141.5 LBS | DIASTOLIC BLOOD PRESSURE: 72 MMHG

## 2022-09-12 DIAGNOSIS — G40.409 OTHER GENERALIZED EPILEPSY, NOT INTRACTABLE, WITHOUT STATUS EPILEPTICUS (HCC): ICD-10-CM

## 2022-09-12 DIAGNOSIS — H55.01 CONGENITAL NYSTAGMUS: ICD-10-CM

## 2022-09-12 DIAGNOSIS — F41.9 ANXIETY: ICD-10-CM

## 2022-09-12 DIAGNOSIS — R26.9 ABNORMALITY OF GAIT AND MOBILITY: ICD-10-CM

## 2022-09-12 DIAGNOSIS — G40.409 OTHER GENERALIZED EPILEPSY AND EPILEPTIC SYNDROMES, NOT INTRACTABLE, WITHOUT STATUS EPILEPTICUS (HCC): Primary | ICD-10-CM

## 2022-09-12 DIAGNOSIS — Q04.0 AGENESIS OF CORPUS CALLOSUM (HCC): ICD-10-CM

## 2022-09-12 PROCEDURE — 99214 OFFICE O/P EST MOD 30 MIN: CPT | Performed by: PSYCHIATRY & NEUROLOGY

## 2022-09-12 RX ORDER — RISPERIDONE 3 MG/1
3 TABLET, FILM COATED ORAL 2 TIMES DAILY
COMMUNITY

## 2022-09-12 ASSESSMENT — ENCOUNTER SYMPTOMS
PHOTOPHOBIA: 0
VOMITING: 0
COLOR CHANGE: 0
NAUSEA: 0
BACK PAIN: 0
SHORTNESS OF BREATH: 0
CHOKING: 0
TROUBLE SWALLOWING: 0

## 2022-09-12 NOTE — PROGRESS NOTES
Subjective:      Patient ID: Rosalie Romero is a 32 y.o. male who presents today for:  Chief Complaint   Patient presents with    6 Month Follow-Up     Pts caregiver states no recent seizures. No concerns at this time. HPI 32 right-handed male with a history of generalized seizures. 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 Patient continues on Lamictal 150 mg twice a day without recurrent seizures. Patient is on Geodon as well. He has gait ataxia with cerebral palsy with minor tremors but has not any falls injuries or trauma. Past Medical History:   Diagnosis Date    ADHD (attention deficit hyperactivity disorder)     Anxiety     Epilepsy ([facility] [address]     Myopia     Nystagmus     Optic nerve hypoplasia      No past surgical history on file. Social History     Socioeconomic History    Marital status: Single     Spouse name: Not on file    Number of children: Not on file    Years of education: Not on file    Highest education level: Not on file   Occupational History    Not on file   Tobacco Use    Smoking status: Never    Smokeless tobacco: Never   Vaping Use    Vaping Use: Never used   Substance and Sexual Activity    Alcohol use: No     Alcohol/week: 0.0 standard drinks    Drug use: No    Sexual activity: Not Currently   Other Topics Concern    Not on file   Social History Narrative    Not on file     Social Determinants of Health     Financial Resource Strain: Low Risk     Difficulty of Paying Living Expenses: Not hard at all   Food Insecurity: No Food Insecurity    Worried About Running Out of Food in the Last Year: Never true    Ran Out of Food in the Last Year: Never true   Transportation Needs: Not on file   Physical Activity: Not on file   Stress: Not on file   Social Connections: Not on file   Intimate Partner Violence: Not on file   Housing Stability: Not on file     No family history on file.   Allergies   Allergen Reactions    Seasonal        Current Outpatient Medications   Medication Sig Dispense Refill    risperiDONE (RISPERDAL) 3 MG tablet Take 3 mg by mouth 2 times daily      Cholecalciferol (VITAMIN D3) 50 MCG (2000 UT) CAPS Take by mouth      loperamide (RA ANTI-DIARRHEAL) 2 MG capsule Initial: 4 mg, followed by 2 mg PRN after each loose stool for 3 days. Max: 8 mg in 1 day. 16 capsule 0    carvedilol (COREG) 3.125 MG tablet Take 3.125 mg by mouth 2 times daily      sacubitril-valsartan (ENTRESTO) 24-26 MG per tablet Take 1 tablet by mouth 2 times daily      dapagliflozin (FARXIGA) 10 MG tablet Take 10 mg by mouth every morning      sertraline (ZOLOFT) 25 MG tablet Take 25 mg by mouth daily       sertraline (ZOLOFT) 50 MG tablet Take 100 mg by mouth daily       spironolactone (ALDACTONE) 25 MG tablet Take 25 mg by mouth daily       Lactobacillus (ACIDOPHILUS PO) Take by mouth       cyclobenzaprine (FLEXERIL) 10 MG tablet Take 10 mg by mouth 3 times daily as needed for Muscle spasms      magnesium hydroxide (MILK OF MAGNESIA) 400 MG/5ML suspension Take by mouth daily as needed for Constipation       lidocaine (SOLARCAINE COOL ALOE) 0.5 % topical spray Apply topically 3 times daily as needed for Pain      sodium chloride (ALTAMIST SPRAY) 0.65 % nasal spray 1 spray by Nasal route as needed for Congestion 1 Bottle 3    naproxen (NAPROSYN) 500 MG tablet Take 1 tablet by mouth 2 times daily For pain 14 tablet 0    Probiotic Product (PROBIOTIC-10 PO) Take by mouth      lamoTRIgine (LAMICTAL) 150 MG tablet Take 150 mg by mouth 2 times daily      clonazePAM (KLONOPIN) 1 MG tablet Take 1 tablet by mouth 2 times daily as needed.  90 tablet 2    fluticasone (FLONASE) 50 MCG/ACT nasal spray 1 spray by Each Nostril route daily 3 Bottle 1    acetaminophen (TYLENOL) 325 MG tablet Take 650 mg by mouth every 6 hours as needed for Pain      Handicap Placard MISC by Does not apply route Good from 3/4/20 - 3/4/25 1 each 0    Artificial Tear Solution (GENTEAL TEARS) 0.1-0.2-0.3 % SOLN Apply 1 drop to eye 2 times daily 1 Bottle 3    cetirizine (ZYRTEC ALLERGY) 10 MG tablet Take 1 tablet by mouth daily 30 tablet 5    Multiple Vitamin (MULTI VITAMIN DAILY PO) Take by mouth daily      diphenhydrAMINE (BENADRYL) 12.5 MG/5ML elixir Take 5 mLs by mouth 4 times daily as needed for Itching (Patient not taking: Reported on 9/12/2022) 118 mL 0    Bacitracin-Polymyxin B 500-73339 UNIT/GM PADS Apply 1 each topically in the morning and at bedtime (Patient not taking: No sig reported) 8 each 0    ziprasidone (GEODON) 20 MG capsule Take 20 mg by mouth in the morning and at bedtime  (Patient not taking: Reported on 9/12/2022)      ARIPiprazole (ABILIFY) 5 MG tablet Take 5 mg by mouth daily  (Patient not taking: No sig reported)      Sodium Phosphates (FLEET) 7-19 GM/118ML Place 1 enema rectally once as needed (Patient not taking: No sig reported)      Dextromethorphan-guaiFENesin  MG/5ML SYRP Take 5 mLs by mouth every 4 hours as needed for Cough  (Patient not taking: Reported on 9/12/2022)      Acetylcysteine (NAC) 600 MG CAPS Take by mouth 2 PO QAM (Patient not taking: No sig reported)      Docusate Sodium (DULCOLAX STOOL SOFTENER PO) Take 1 Dose by mouth as needed (Patient not taking: Reported on 9/12/2022)      FLUoxetine (PROZAC) 40 MG capsule Take 40 mg by mouth daily  (Patient not taking: No sig reported) 90 capsule 3     No current facility-administered medications for this visit. Review of Systems   Constitutional:  Negative for fever. HENT:  Negative for ear pain, tinnitus and trouble swallowing. Eyes:  Negative for photophobia and visual disturbance. Respiratory:  Negative for choking and shortness of breath. Cardiovascular:  Negative for chest pain and palpitations. Gastrointestinal:  Negative for nausea and vomiting. Musculoskeletal:  Negative for back pain, gait problem, joint swelling, myalgias, neck pain and neck stiffness. Skin:  Negative for color change. Allergic/Immunologic: Negative for food allergies. Neurological:  Negative for dizziness, tremors, seizures, syncope, facial asymmetry, speech difficulty, weakness, light-headedness, numbness and headaches. Psychiatric/Behavioral:  Negative for behavioral problems, confusion, hallucinations and sleep disturbance. Objective:   /72 (Site: Left Upper Arm, Position: Sitting, Cuff Size: Medium Adult)   Pulse 76   Wt 141 lb 8 oz (64.2 kg)   BMI 22.16 kg/m²     Physical Exam  Vitals reviewed. Eyes:      Pupils: Pupils are equal, round, and reactive to light. Cardiovascular:      Rate and Rhythm: Normal rate and regular rhythm. Heart sounds: No murmur heard. Pulmonary:      Effort: Pulmonary effort is normal.      Breath sounds: Normal breath sounds. Abdominal:      General: Bowel sounds are normal.   Musculoskeletal:         General: Normal range of motion. Cervical back: Normal range of motion. Skin:     General: Skin is warm. Neurological:      Mental Status: He is alert and oriented to person, place, and time. Cranial Nerves: No cranial nerve deficit. Sensory: No sensory deficit. Motor: No abnormal muscle tone. Coordination: Coordination normal.      Deep Tendon Reflexes: Reflexes are normal and symmetric. Babinski sign absent on the right side. Babinski sign absent on the left side. Psychiatric:         Mood and Affect: Mood normal.     Mild tremors and ataxia notable  No results found.     Lab Results   Component Value Date/Time    WBC 4.6 07/04/2022 09:23 PM    WBC 4.4 04/09/2021 04:00 PM    RBC 4.90 07/04/2022 09:23 PM    HGB 14.8 07/04/2022 09:23 PM    HCT 42.8 07/04/2022 09:23 PM    MCV 87 07/04/2022 09:23 PM    MCH 31.1 04/09/2021 04:00 PM    MCHC 34.6 07/04/2022 09:23 PM    RDW 13.1 04/09/2021 04:00 PM     07/04/2022 09:23 PM     Lab Results   Component Value Date/Time     07/04/2022 09:24 PM    K 3.8 07/04/2022 09:24 PM     07/04/2022 09:24 PM    CO2 28 06/24/2021 06:25 AM    BUN 14 06/24/2021 06:25 AM    CREATININE 0.90 07/04/2022 09:24 PM    GFRAA >60.0 06/24/2021 06:25 AM    LABGLOM >60.0 06/24/2021 06:25 AM    GLUCOSE 107 07/04/2022 09:24 PM    PROT 6.9 07/04/2022 09:24 PM    LABALBU 4.5 07/04/2022 09:24 PM    CALCIUM 9.2 07/04/2022 09:24 PM    BILITOT 0.6 07/04/2022 09:24 PM    ALKPHOS 79 07/04/2022 09:24 PM    AST 21 07/04/2022 09:24 PM    ALT 21 07/04/2022 09:24 PM     No results found for: PROTIME, INR  Lab Results   Component Value Date/Time    TSH 1.650 10/15/2020 12:00 PM     Lab Results   Component Value Date/Time    TRIG 26 10/03/2019 06:35 AM    HDL 56 10/03/2019 06:35 AM    LDLCALC 17 10/03/2019 06:35 AM     Lab Results   Component Value Date/Time    LABAMPH PRESUMPTIVE NEGATIVE 07/04/2022 09:42 PM    LABBENZ PRESUMPTIVE NEGATIVE 07/04/2022 09:42 PM    LABMETH PRESUMPTIVE NEGATIVE 07/04/2022 09:42 PM    PHENCYCLIDINESCREENURINE PRESUMPTIVE NEGATIVE 07/04/2022 09:42 PM     No results found for: LITHIUM, DILFRTOT, VALPROATE    Assessment:       Diagnosis Orders   1. Other generalized epilepsy and epileptic syndromes, not intractable, without status epilepticus ([address]        2. Agenesis of corpus callosum (HCC)        3. Other generalized epilepsy, not intractable, without status epilepticus ([address]        4. Abnormality of gait and mobility        5. Congenital nystagmus        6. Anxiety        Generalized epilepsy associated agenesis of the corpus callosum with mild ataxia. Patient actually doing quite well and has some underlying anxieties and is on medication. Patient is on Lamictal 150 mg twice a day and multiple other psychiatric medications such as Geodon. Patient has optic nerve hypoplasia but has not any visual dysfunction. Is not any major behavioral issues. We continue keep her observation of the same.   Patient actually has some difficulty with his ambulation due to visual dysfunction more than just his ataxia is developing some degree of dystonia on the left arm which we are monitoring. Plan:      No orders of the defined types were placed in this encounter. No orders of the defined types were placed in this encounter. No follow-ups on file.       Jay Blue MD

## 2022-09-21 ENCOUNTER — HOSPITAL ENCOUNTER (EMERGENCY)
Age: 27
Discharge: HOME | End: 2022-09-21
Attending: EMERGENCY MEDICINE
Payer: MEDICAID

## 2022-09-21 ENCOUNTER — APPOINTMENT (OUTPATIENT)
Dept: GENERAL RADIOLOGY | Age: 27
End: 2022-09-21
Payer: MEDICAID

## 2022-09-21 VITALS
HEART RATE: 84 BPM | TEMPERATURE: 97.7 F | RESPIRATION RATE: 16 BRPM | HEIGHT: 70 IN | BODY MASS INDEX: 20.76 KG/M2 | OXYGEN SATURATION: 97 % | DIASTOLIC BLOOD PRESSURE: 71 MMHG | SYSTOLIC BLOOD PRESSURE: 114 MMHG | WEIGHT: 145 LBS

## 2022-09-21 DIAGNOSIS — S49.90XA SHOULDER INJURY, INITIAL ENCOUNTER: ICD-10-CM
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
## 2022-09-21 DIAGNOSIS — S40.022A ARM BRUISE, LEFT, INITIAL ENCOUNTER: ICD-10-CM

## 2022-09-21 PROCEDURE — 73030 X-RAY EXAM OF SHOULDER: CPT

## 2022-09-21 PROCEDURE — 99283 EMERGENCY DEPT VISIT LOW MDM: CPT

## 2022-09-21 PROCEDURE — 6370000000 HC RX 637 (ALT 250 FOR IP): Performed by: EMERGENCY MEDICINE

## 2022-09-21 RX ORDER — IBUPROFEN 600 MG/1
600 TABLET ORAL EVERY 8 HOURS PRN
Qty: 30 TABLET | Refills: 0 | Status: SHIPPED | OUTPATIENT
Start: 2022-09-21

## 2022-09-21 RX ORDER — SACUBITRIL AND VALSARTAN 24; 26 MG/1; MG/1
1 TABLET, FILM COATED ORAL 2 TIMES DAILY
COMMUNITY

## 2022-09-21 RX ORDER — DIAPER,BRIEF,INFANT-TODD,DISP
EACH MISCELLANEOUS ONCE
Status: COMPLETED | OUTPATIENT
Start: 2022-09-21 | End: 2022-09-21

## 2022-09-21 RX ORDER — IBUPROFEN 600 MG/1
600 TABLET ORAL ONCE
Status: COMPLETED | OUTPATIENT
Start: 2022-09-21 | End: 2022-09-21

## 2022-09-21 RX ADMIN — IBUPROFEN 600 MG: 600 TABLET ORAL at 08:43

## 2022-09-21 RX ADMIN — BACITRACIN ZINC: 500 OINTMENT TOPICAL at 08:44

## 2022-09-21 ASSESSMENT — ENCOUNTER SYMPTOMS
COUGH: 0
VOMITING: 0
COLOR CHANGE: 1
DIARRHEA: 0
BACK PAIN: 0
SORE THROAT: 0
FACIAL SWELLING: 0
TROUBLE SWALLOWING: 0
SHORTNESS OF BREATH: 0
VOICE CHANGE: 0
EYE REDNESS: 0
CHOKING: 0
STRIDOR: 0
ABDOMINAL PAIN: 0
BLOOD IN STOOL: 0
CONSTIPATION: 0
WHEEZING: 0
EYE PAIN: 0
CHEST TIGHTNESS: 0
EYE DISCHARGE: 0
SINUS PRESSURE: 0

## 2022-09-21 ASSESSMENT — PAIN - FUNCTIONAL ASSESSMENT: PAIN_FUNCTIONAL_ASSESSMENT: 0-10

## 2022-09-21 ASSESSMENT — PAIN DESCRIPTION - DESCRIPTORS
DESCRIPTORS: ACHING
DESCRIPTORS: ACHING

## 2022-09-21 ASSESSMENT — PAIN DESCRIPTION - ORIENTATION
ORIENTATION: LEFT
ORIENTATION: LEFT

## 2022-09-21 ASSESSMENT — PAIN SCALES - GENERAL
PAINLEVEL_OUTOF10: 6
PAINLEVEL_OUTOF10: 6

## 2022-09-21 ASSESSMENT — PAIN DESCRIPTION - PAIN TYPE: TYPE: ACUTE PAIN

## 2022-09-21 ASSESSMENT — PAIN DESCRIPTION - ONSET: ONSET: SUDDEN

## 2022-09-21 ASSESSMENT — PAIN DESCRIPTION - LOCATION
LOCATION: SHOULDER
LOCATION: SHOULDER
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
## 2022-09-21 NOTE — ED NOTES
[facility] called r/t Pt status with Pt update, p[karen of care and pending d/c.  [facility] staff to come  Pt and take Pt back to facility.      Alanna Boeck, RN  09/21/22 0110
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
## 2023-03-01 PROBLEM — R46.89 OTHER SYMPTOMS AND SIGNS INVOLVING APPEARANCE AND BEHAVIOR: Status: ACTIVE | Noted: 2022-07-05

## 2023-03-01 PROBLEM — I50.9 HEART FAILURE, UNSPECIFIED (HCC): Status: ACTIVE | Noted: 2022-01-19

## 2023-03-01 PROBLEM — R45.88: Status: ACTIVE | Noted: 2022-07-05

## 2023-03-01 PROBLEM — R56.9 UNSPECIFIED CONVULSIONS (HCC): Status: ACTIVE | Noted: 2022-07-05

## 2023-03-01 PROBLEM — F91.3 OPPOSITIONAL DEFIANT DISORDER: Status: ACTIVE | Noted: 2021-02-17

## 2023-03-01 PROBLEM — I50.22 CHRONIC SYSTOLIC (CONGESTIVE) HEART FAILURE (HCC): Status: ACTIVE | Noted: 2022-01-19

## 2023-03-13 ENCOUNTER — OFFICE VISIT (OUTPATIENT)
Dept: NEUROLOGY | Age: 28
End: 2023-03-13

## 2023-03-13 VITALS
HEART RATE: 85 BPM | DIASTOLIC BLOOD PRESSURE: 62 MMHG | SYSTOLIC BLOOD PRESSURE: 102 MMHG | BODY MASS INDEX: 19.13 KG/M2 | WEIGHT: 133.3 LBS

## 2023-03-13 DIAGNOSIS — H55.00 NYSTAGMUS: ICD-10-CM

## 2023-03-13 DIAGNOSIS — Q04.0 AGENESIS OF CORPUS CALLOSUM (HCC): ICD-10-CM

## 2023-03-13 DIAGNOSIS — D35.2 PROLACTINOMA (HCC): ICD-10-CM

## 2023-03-13 DIAGNOSIS — G40.409 OTHER GENERALIZED EPILEPSY AND EPILEPTIC SYNDROMES, NOT INTRACTABLE, WITHOUT STATUS EPILEPTICUS (HCC): ICD-10-CM

## 2023-03-13 DIAGNOSIS — H47.033 OPTIC NERVE HYPOPLASIA, BILATERAL: ICD-10-CM

## 2023-03-13 DIAGNOSIS — R29.91 MARFANOID HABITUS: ICD-10-CM

## 2023-03-13 DIAGNOSIS — R26.9 ABNORMALITY OF GAIT AND MOBILITY: ICD-10-CM

## 2023-03-13 DIAGNOSIS — G40.409 OTHER GENERALIZED EPILEPSY, NOT INTRACTABLE, WITHOUT STATUS EPILEPTICUS (HCC): Primary | ICD-10-CM

## 2023-03-13 DIAGNOSIS — E23.7 DISEASE OF PITUITARY GLAND (HCC): ICD-10-CM

## 2023-03-13 RX ORDER — BUSPIRONE HYDROCHLORIDE 5 MG/1
TABLET ORAL
COMMUNITY
Start: 2022-11-03

## 2023-03-13 RX ORDER — SERTRALINE HYDROCHLORIDE 100 MG/1
200 TABLET, FILM COATED ORAL DAILY
COMMUNITY
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
## 2023-03-14 LAB
ALBUMIN SERPL-MCNC: 4.6 G/DL (ref 3.5–4.6)
ALP BLD-CCNC: 89 U/L (ref 35–104)
ALT SERPL-CCNC: 26 U/L (ref 0–41)
AST SERPL-CCNC: 20 U/L (ref 0–40)
BASOPHILS ABSOLUTE: 0 K/UL (ref 0–0.2)
BASOPHILS RELATIVE PERCENT: 1 %
BILIRUB SERPL-MCNC: 0.4 MG/DL (ref 0.2–0.7)
BILIRUBIN DIRECT: <0.2 MG/DL (ref 0–0.4)
BILIRUBIN, INDIRECT: NORMAL MG/DL (ref 0–0.6)
EOSINOPHILS ABSOLUTE: 0 K/UL (ref 0–0.7)
EOSINOPHILS RELATIVE PERCENT: 1.7 %
HCT VFR BLD CALC: 42.4 % (ref 42–52)
HEMOGLOBIN: 14.6 G/DL (ref 14–18)
LYMPHOCYTES ABSOLUTE: 1 K/UL (ref 1–4.8)
LYMPHOCYTES RELATIVE PERCENT: 44.7 %
MCH RBC QN AUTO: 31.2 PG (ref 27–31.3)
MCHC RBC AUTO-ENTMCNC: 34.3 % (ref 33–37)
MCV RBC AUTO: 90.9 FL (ref 79–92.2)
MONOCYTES ABSOLUTE: 0.2 K/UL (ref 0.2–0.8)
MONOCYTES RELATIVE PERCENT: 9.6 %
NEUTROPHILS ABSOLUTE: 1 K/UL (ref 1.4–6.5)
NEUTROPHILS RELATIVE PERCENT: 43 %
PDW BLD-RTO: 13.5 % (ref 11.5–14.5)
PLATELET # BLD: 207 K/UL (ref 130–400)
RBC # BLD: 4.67 M/UL (ref 4.7–6.1)
TOTAL PROTEIN: 7 G/DL (ref 6.3–8)
WBC # BLD: 2.3 K/UL (ref 4.8–10.8)

## 2023-03-15 LAB — LAMOTRIGINE SERPL-MCNC: 6 UG/ML (ref 3–15)

## 2023-03-31 NOTE — PROGRESS NOTES
Subjective   Patient ID: Kehinde Gonzalez is a 28 y.o. male who presents for Annual Exam (And follow up labs /Kehinde Gonzalez 28 y.o. male is here today follow up on Anxiety and medications /).    Well Adult Physical   Patient here for a comprehensive physical exam.The patient reports patient continues to follow with neurology, psychiatry and cardiology routinely    Do you take any herbs or supplements that were not prescribed by a doctor? no   Are you taking calcium supplements? no   Are you taking aspirin daily? no     History:  Not applicable.  No  symptomatology.             Review of Systems   Constitutional:  Negative for activity change, appetite change, chills, diaphoresis, fatigue, fever and unexpected weight change.   HENT:  Negative for congestion, ear pain, hearing loss, nosebleeds, postnasal drip, rhinorrhea, sinus pressure, sneezing, sore throat, tinnitus, trouble swallowing and voice change.    Eyes:  Negative for photophobia, pain, discharge, redness, itching and visual disturbance.   Respiratory:  Negative for cough, choking, chest tightness, shortness of breath and wheezing.    Cardiovascular:  Negative for chest pain, palpitations and leg swelling.   Gastrointestinal:  Negative for abdominal distention, abdominal pain, blood in stool, constipation, diarrhea, nausea and vomiting.   Endocrine: Negative for cold intolerance, heat intolerance, polydipsia and polyuria.   Genitourinary:  Positive for enuresis. Negative for dysuria, flank pain, frequency, hematuria and urgency.        Occasional enuresis   Musculoskeletal:  Positive for gait problem. Negative for arthralgias, back pain, joint swelling, myalgias, neck pain and neck stiffness.   Skin:  Negative for rash and wound.   Allergic/Immunologic: Negative for immunocompromised state.   Neurological:  Positive for speech difficulty. Negative for dizziness, tremors, seizures, syncope, facial asymmetry, weakness, light-headedness, numbness and  "headaches.   Hematological:  Negative for adenopathy. Does not bruise/bleed easily.   Psychiatric/Behavioral:  Positive for behavioral problems. Negative for agitation, confusion, dysphoric mood, hallucinations, self-injury, sleep disturbance and suicidal ideas. The patient is not nervous/anxious.        Objective   /70 (BP Location: Left arm, Patient Position: Sitting, BP Cuff Size: Adult)   Pulse 80   Temp 36.4 °C (97.5 °F) (Temporal)   Resp 16   Ht 1.727 m (5' 8\")   Wt 59.4 kg (131 lb)   SpO2 98%   BMI 19.92 kg/m²     Physical Exam  Constitutional:       General: He is not in acute distress.     Appearance: He is not ill-appearing or diaphoretic.   HENT:      Head: Normocephalic and atraumatic.      Right Ear: External ear normal.      Left Ear: External ear normal.      Nose: Nose normal. No rhinorrhea.   Eyes:      General: Lids are normal. No scleral icterus.        Right eye: No discharge.         Left eye: No discharge.      Conjunctiva/sclera: Conjunctivae normal.   Cardiovascular:      Rate and Rhythm: Normal rate and regular rhythm.      Pulses: Normal pulses.      Heart sounds: No murmur heard.  Pulmonary:      Effort: Pulmonary effort is normal. No respiratory distress.      Breath sounds: No decreased breath sounds, wheezing, rhonchi or rales.   Abdominal:      General: Bowel sounds are normal. There is no distension.      Palpations: Abdomen is soft. There is no mass.      Tenderness: There is no abdominal tenderness. There is no guarding or rebound.   Musculoskeletal:         General: Deformity present. No swelling or tenderness.      Cervical back: No rigidity or tenderness.      Right lower leg: No edema.      Left lower leg: No edema.      Comments: Marfanoid habitus   Lymphadenopathy:      Cervical: No cervical adenopathy.      Upper Body:      Right upper body: No supraclavicular adenopathy.      Left upper body: No supraclavicular adenopathy.   Skin:     General: Skin is warm and " dry.      Coloration: Skin is not jaundiced or pale.      Findings: No erythema, lesion or rash.   Neurological:      General: No focal deficit present.      Mental Status: He is alert and oriented to person, place, and time.      Sensory: No sensory deficit.      Motor: No weakness or tremor.      Coordination: Coordination normal.      Gait: Gait abnormal.   Psychiatric:         Mood and Affect: Mood normal. Affect is not inappropriate.         Behavior: Behavior normal.         Assessment/Plan   Diagnoses and all orders for this visit:  Routine general medical examination at a health care facility  -     CBC and Auto Differential; Future  -     Comprehensive Metabolic Panel; Future  -     Lipid Panel; Future  -     Magnesium; Future  -     TSH with reflex to Free T4 if abnormal; Future  Vitamin D deficiency  -     Vitamin D, Total; Future  Chronic systolic (congestive) heart failure (CMS/HCC)  -     CBC and Auto Differential; Future  -     Comprehensive Metabolic Panel; Future  -     Lipid Panel; Future  -     Magnesium; Future  -     TSH with reflex to Free T4 if abnormal; Future  Nonsuicidal self-harm (CMS/HCC)  Other generalized epilepsy and epileptic syndromes, not intractable, without status epilepticus (CMS/HCC)  Agenesis of corpus callosum (CMS/HCC)  Disease of pituitary gland (CMS/HCC)  Marfanoid habitus  -     CBC and Auto Differential; Future  -     Comprehensive Metabolic Panel; Future       Patient was identified as a fall risk. Risk prevention instructions provided.

## 2023-04-06 ENCOUNTER — OFFICE VISIT (OUTPATIENT)
Dept: PRIMARY CARE | Facility: CLINIC | Age: 28
End: 2023-04-06
Payer: MEDICAID

## 2023-04-06 VITALS
HEIGHT: 68 IN | OXYGEN SATURATION: 98 % | RESPIRATION RATE: 16 BRPM | WEIGHT: 131 LBS | SYSTOLIC BLOOD PRESSURE: 111 MMHG | DIASTOLIC BLOOD PRESSURE: 70 MMHG | HEART RATE: 80 BPM | BODY MASS INDEX: 19.85 KG/M2 | TEMPERATURE: 97.5 F

## 2023-04-06 DIAGNOSIS — R29.91 MARFANOID HABITUS: ICD-10-CM

## 2023-04-06 DIAGNOSIS — E23.7 DISEASE OF PITUITARY GLAND (MULTI): ICD-10-CM

## 2023-04-06 DIAGNOSIS — G40.409 OTHER GENERALIZED EPILEPSY AND EPILEPTIC SYNDROMES, NOT INTRACTABLE, WITHOUT STATUS EPILEPTICUS (MULTI): ICD-10-CM

## 2023-04-06 DIAGNOSIS — I50.22 CHRONIC SYSTOLIC (CONGESTIVE) HEART FAILURE (MULTI): ICD-10-CM

## 2023-04-06 DIAGNOSIS — E55.9 VITAMIN D DEFICIENCY: ICD-10-CM

## 2023-04-06 DIAGNOSIS — Q04.0 AGENESIS OF CORPUS CALLOSUM (MULTI): ICD-10-CM

## 2023-04-06 DIAGNOSIS — R45.88 NONSUICIDAL SELF-HARM (MULTI): ICD-10-CM

## 2023-04-06 DIAGNOSIS — Z00.00 ROUTINE GENERAL MEDICAL EXAMINATION AT A HEALTH CARE FACILITY: Primary | ICD-10-CM

## 2023-04-06 PROBLEM — H54.8 LEGAL BLINDNESS, AS DEFINED IN USA: Status: ACTIVE | Noted: 2018-06-19

## 2023-04-06 PROBLEM — I42.9 CARDIOMYOPATHY (MULTI): Status: ACTIVE | Noted: 2022-02-18

## 2023-04-06 PROBLEM — H55.00 NYSTAGMUS: Status: ACTIVE | Noted: 2018-06-19

## 2023-04-06 PROBLEM — R47.1 DYSARTHRIA: Status: ACTIVE | Noted: 2020-02-24

## 2023-04-06 PROBLEM — F69 BEHAVIOR CONCERN IN ADULT: Status: ACTIVE | Noted: 2022-08-31

## 2023-04-06 PROBLEM — H52.13 MYOPIA OF BOTH EYES WITH ASTIGMATISM: Status: ACTIVE | Noted: 2018-06-19

## 2023-04-06 PROBLEM — R79.89 INCREASED PROLACTIN LEVEL: Status: ACTIVE | Noted: 2021-02-17

## 2023-04-06 PROBLEM — I69.30 MULTI INFARCT STATE: Status: ACTIVE | Noted: 2020-02-24

## 2023-04-06 PROBLEM — I50.9 HEART FAILURE, UNSPECIFIED (MULTI): Status: ACTIVE | Noted: 2022-01-19

## 2023-04-06 PROBLEM — L98.1 NEUROTIC EXCORIATIONS: Status: ACTIVE | Noted: 2023-04-06

## 2023-04-06 PROBLEM — H52.203 MYOPIA OF BOTH EYES WITH ASTIGMATISM: Status: ACTIVE | Noted: 2018-06-19

## 2023-04-06 PROBLEM — R26.9 ABNORMALITY OF GAIT AND MOBILITY: Status: ACTIVE | Noted: 2018-07-12

## 2023-04-06 PROBLEM — F71 MODERATE INTELLECTUAL DISABILITY: Status: ACTIVE | Noted: 2021-02-17

## 2023-04-06 PROBLEM — F91.9 DISRUPTIVE BEHAVIOR DISORDER: Status: ACTIVE | Noted: 2022-04-21

## 2023-04-06 PROBLEM — L30.9 DERMATITIS, UNSPECIFIED: Status: ACTIVE | Noted: 2022-06-21

## 2023-04-06 PROBLEM — F90.9 ATTENTION DEFICIT HYPERACTIVITY DISORDER (ADHD): Status: ACTIVE | Noted: 2021-02-17

## 2023-04-06 PROBLEM — I51.9 LEFT VENTRICULAR DYSFUNCTION: Status: ACTIVE | Noted: 2022-02-18

## 2023-04-06 PROBLEM — I51.9 HEART DISEASE, UNSPECIFIED: Status: ACTIVE | Noted: 2022-11-01
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
## 2023-04-06 PROCEDURE — 99395 PREV VISIT EST AGE 18-39: CPT | Performed by: FAMILY MEDICINE

## 2023-04-06 PROCEDURE — 1036F TOBACCO NON-USER: CPT | Performed by: FAMILY MEDICINE

## 2023-04-06 RX ORDER — DOCUSATE SODIUM 100 MG/1
1 CAPSULE, LIQUID FILLED ORAL DAILY PRN
COMMUNITY
Start: 2021-01-05

## 2023-04-06 RX ORDER — IBUPROFEN 600 MG/1
1 TABLET ORAL EVERY 8 HOURS PRN
COMMUNITY
Start: 2022-09-21

## 2023-04-06 RX ORDER — CARVEDILOL 3.12 MG/1
1 TABLET ORAL 2 TIMES DAILY
COMMUNITY
Start: 2021-04-12

## 2023-04-06 RX ORDER — GUAIFENESIN AND DEXTROMETHORPHAN HYDROBROMIDE 10; 100 MG/5ML; MG/5ML
10 SYRUP ORAL EVERY 4 HOURS PRN
COMMUNITY

## 2023-04-06 RX ORDER — NAPROXEN 500 MG/1
500 TABLET ORAL
COMMUNITY
Start: 2021-04-09 | End: 2024-04-11 | Stop reason: ALTCHOICE

## 2023-04-06 RX ORDER — BUSPIRONE HYDROCHLORIDE 5 MG/1
TABLET ORAL
COMMUNITY
Start: 2022-11-03 | End: 2023-10-20 | Stop reason: SDUPTHER

## 2023-04-06 RX ORDER — LAMOTRIGINE 100 MG/1
150 TABLET ORAL 2 TIMES DAILY
COMMUNITY

## 2023-04-06 RX ORDER — CETIRIZINE HYDROCHLORIDE 10 MG/1
1 TABLET ORAL DAILY
COMMUNITY
Start: 2017-08-24

## 2023-04-06 RX ORDER — BISACODYL 10 MG/1
1 SUPPOSITORY RECTAL DAILY PRN
COMMUNITY

## 2023-04-06 RX ORDER — LURASIDONE HYDROCHLORIDE 20 MG/1
20 TABLET, FILM COATED ORAL
COMMUNITY
Start: 2021-10-11 | End: 2023-10-20 | Stop reason: ALTCHOICE

## 2023-04-06 RX ORDER — CLONAZEPAM 1 MG/1
TABLET ORAL
COMMUNITY
Start: 2015-10-05 | End: 2023-10-20 | Stop reason: ALTCHOICE

## 2023-04-06 RX ORDER — RISPERIDONE 3 MG/1
3 TABLET ORAL 2 TIMES DAILY
COMMUNITY
Start: 2022-07-18 | End: 2023-10-20 | Stop reason: SDUPTHER

## 2023-04-06 RX ORDER — LURASIDONE HYDROCHLORIDE 80 MG/1
TABLET, FILM COATED ORAL
COMMUNITY
Start: 2021-11-07 | End: 2023-10-20 | Stop reason: ALTCHOICE

## 2023-04-06 RX ORDER — ADHESIVE BANDAGE
BANDAGE TOPICAL DAILY PRN
COMMUNITY

## 2023-04-06 RX ORDER — LOPERAMIDE HYDROCHLORIDE 2 MG/1
CAPSULE ORAL
COMMUNITY
Start: 2021-10-07

## 2023-04-06 RX ORDER — GUAIFENESIN 100 MG/5ML
SOLUTION ORAL
COMMUNITY

## 2023-04-06 RX ORDER — SPIRONOLACTONE 25 MG/1
12.5 TABLET ORAL DAILY
COMMUNITY

## 2023-04-06 RX ORDER — SERTRALINE HYDROCHLORIDE 100 MG/1
TABLET, FILM COATED ORAL
COMMUNITY
Start: 2021-06-24 | End: 2023-11-20 | Stop reason: SDUPTHER

## 2023-04-06 RX ORDER — ACETAMINOPHEN 325 MG/1
650 TABLET ORAL EVERY 6 HOURS PRN
COMMUNITY

## 2023-04-06 RX ORDER — DAPAGLIFLOZIN 10 MG/1
1 TABLET, FILM COATED ORAL DAILY
COMMUNITY
Start: 2021-06-02

## 2023-04-06 RX ORDER — OXYBENZONE/HOMOSALATE/OCTINOX
LOTION (ML) TOPICAL
COMMUNITY

## 2023-04-06 RX ORDER — FLUTICASONE PROPIONATE 50 MCG
1 SPRAY, SUSPENSION (ML) NASAL
COMMUNITY
Start: 2021-01-06

## 2023-04-06 RX ORDER — SACUBITRIL AND VALSARTAN 24; 26 MG/1; MG/1
1 TABLET, FILM COATED ORAL 2 TIMES DAILY
COMMUNITY
Start: 2021-05-13

## 2023-04-06 ASSESSMENT — ENCOUNTER SYMPTOMS
ABDOMINAL PAIN: 0
ADENOPATHY: 0
APPETITE CHANGE: 0
LIGHT-HEADEDNESS: 0
HEMATURIA: 0
EYE DISCHARGE: 0
CHOKING: 0
DIARRHEA: 0
CONSTIPATION: 0
EYE REDNESS: 0
NECK PAIN: 0
DYSPHORIC MOOD: 0
SINUS PRESSURE: 0
SEIZURES: 0
ACTIVITY CHANGE: 0
DYSURIA: 0
COUGH: 0
PALPITATIONS: 0
SLEEP DISTURBANCE: 0
ARTHRALGIAS: 0
POLYDIPSIA: 0
CHILLS: 0
VOMITING: 0
UNEXPECTED WEIGHT CHANGE: 0
FLANK PAIN: 0
FREQUENCY: 0
BLOOD IN STOOL: 0
SHORTNESS OF BREATH: 0
DIAPHORESIS: 0
NECK STIFFNESS: 0
HEADACHES: 0
SORE THROAT: 0
BRUISES/BLEEDS EASILY: 0
CONFUSION: 0
CHEST TIGHTNESS: 0
NUMBNESS: 0
BACK PAIN: 0
NERVOUS/ANXIOUS: 0
EYE PAIN: 0
SPEECH DIFFICULTY: 1
TREMORS: 0
NAUSEA: 0
DIZZINESS: 0
ABDOMINAL DISTENTION: 0
TROUBLE SWALLOWING: 0
EYE ITCHING: 0
AGITATION: 0
WHEEZING: 0
PHOTOPHOBIA: 0
RHINORRHEA: 0
VOICE CHANGE: 0
FACIAL ASYMMETRY: 0
JOINT SWELLING: 0
WEAKNESS: 0
WOUND: 0
HALLUCINATIONS: 0
FATIGUE: 0
FEVER: 0
MYALGIAS: 0

## 2023-04-06 ASSESSMENT — PATIENT HEALTH QUESTIONNAIRE - PHQ9
SUM OF ALL RESPONSES TO PHQ9 QUESTIONS 1 AND 2: 0
2. FEELING DOWN, DEPRESSED OR HOPELESS: NOT AT ALL
1. LITTLE INTEREST OR PLEASURE IN DOING THINGS: NOT AT ALL

## 2023-04-06 NOTE — ASSESSMENT & PLAN NOTE
>>ASSESSMENT AND PLAN FOR NONSUICIDAL SELF-HARM (MULTI) WRITTEN ON 4/6/2023 11:28 AM BY VINCE BERNAL, DO    Continue to follow with psychiatry as recommended

## 2023-04-06 NOTE — PATIENT INSTRUCTIONS

## 2023-04-29 ENCOUNTER — APPOINTMENT (OUTPATIENT)
Dept: GENERAL RADIOLOGY | Age: 28
End: 2023-04-29
Payer: MEDICAID

## 2023-04-29 ENCOUNTER — HOSPITAL ENCOUNTER (EMERGENCY)
Age: 28
Discharge: HOME | End: 2023-04-29
Attending: EMERGENCY MEDICINE
Payer: MEDICAID

## 2023-04-29 VITALS
RESPIRATION RATE: 16 BRPM | HEART RATE: 81 BPM | TEMPERATURE: 98.1 F | SYSTOLIC BLOOD PRESSURE: 104 MMHG | DIASTOLIC BLOOD PRESSURE: 69 MMHG | OXYGEN SATURATION: 99 %

## 2023-04-29 DIAGNOSIS — R07.9 CHEST PAIN, UNSPECIFIED TYPE: Primary | ICD-10-CM

## 2023-04-29 DIAGNOSIS — J40 BRONCHITIS: ICD-10-CM

## 2023-04-29 LAB
ALBUMIN SERPL-MCNC: 4.5 G/DL (ref 3.5–4.6)
ALP SERPL-CCNC: 111 U/L (ref 35–104)
ALT SERPL-CCNC: 18 U/L (ref 0–41)
ANION GAP SERPL CALCULATED.3IONS-SCNC: 9 MEQ/L (ref 9–15)
AST SERPL-CCNC: 19 U/L (ref 0–40)
BASOPHILS # BLD: 0 K/UL (ref 0–0.1)
BASOPHILS NFR BLD: 0.6 % (ref 0.2–1.2)
BILIRUB SERPL-MCNC: 0.6 MG/DL (ref 0.2–0.7)
BUN SERPL-MCNC: 11 MG/DL (ref 6–20)
CALCIUM SERPL-MCNC: 9.5 MG/DL (ref 8.5–9.9)
CHLORIDE SERPL-SCNC: 101 MEQ/L (ref 95–107)
CO2 SERPL-SCNC: 30 MEQ/L (ref 20–31)
CREAT SERPL-MCNC: 0.73 MG/DL (ref 0.7–1.2)
D DIMER PPP FEU-MCNC: <0.27 MG/L FEU (ref 0–0.5)
EOSINOPHIL # BLD: 0 K/UL (ref 0–0.5)
EOSINOPHIL NFR BLD: 1 % (ref 0.8–7)
ERYTHROCYTE [DISTWIDTH] IN BLOOD BY AUTOMATED COUNT: 13.1 % (ref 11.6–14.4)
GLOBULIN SER CALC-MCNC: 2.6 G/DL (ref 2.3–3.5)
GLUCOSE SERPL-MCNC: 135 MG/DL (ref 70–99)
HCT VFR BLD AUTO: 41.3 % (ref 42–52)
HGB BLD-MCNC: 13.9 G/DL (ref 13.7–17.5)
IMM GRANULOCYTES # BLD: 0 K/UL
IMM GRANULOCYTES NFR BLD: 0.3 %
LYMPHOCYTES # BLD: 1 K/UL (ref 1.3–3.6)
LYMPHOCYTES NFR BLD: 29 %
MAGNESIUM SERPL-MCNC: 2.2 MG/DL (ref 1.7–2.4)
MCH RBC QN AUTO: 30.2 PG (ref 25.7–32.2)
MCHC RBC AUTO-ENTMCNC: 33.7 % (ref 32.3–36.5)
MCV RBC AUTO: 89.8 FL (ref 79–92.2)
MONOCYTES # BLD: 0 K/UL (ref 0.3–0.8)
MONOCYTES NFR BLD: 1 % (ref 5.3–12.2)
NEUTROPHILS # BLD: 2.3 K/UL (ref 1.8–5.4)
NEUTS BAND NFR BLD MANUAL: 15 %
NEUTS SEG NFR BLD: 54 % (ref 34–67.9)
PLATELET # BLD AUTO: 166 K/UL (ref 163–337)
POTASSIUM SERPL-SCNC: 4.4 MEQ/L (ref 3.4–4.9)
PROT SERPL-MCNC: 7.1 G/DL (ref 6.3–8)
RBC # BLD AUTO: 4.6 M/UL (ref 4.63–6.08)
SODIUM SERPL-SCNC: 140 MEQ/L (ref 135–144)
TROPONIN T SERPL-MCNC: <0.01 NG/ML (ref 0–0.01)
WBC # BLD AUTO: 3.3 K/UL (ref 4.2–9)

## 2023-04-29 PROCEDURE — 2580000003 HC RX 258: Performed by: EMERGENCY MEDICINE

## 2023-04-29 PROCEDURE — 85025 COMPLETE CBC W/AUTO DIFF WBC: CPT

## 2023-04-29 PROCEDURE — 96374 THER/PROPH/DIAG INJ IV PUSH: CPT

## 2023-04-29 PROCEDURE — 80053 COMPREHEN METABOLIC PANEL: CPT

## 2023-04-29 PROCEDURE — 96361 HYDRATE IV INFUSION ADD-ON: CPT

## 2023-04-29 PROCEDURE — 84484 ASSAY OF TROPONIN QUANT: CPT

## 2023-04-29 PROCEDURE — 99285 EMERGENCY DEPT VISIT HI MDM: CPT

## 2023-04-29 PROCEDURE — 83735 ASSAY OF MAGNESIUM: CPT

## 2023-04-29 PROCEDURE — 71045 X-RAY EXAM CHEST 1 VIEW: CPT

## 2023-04-29 PROCEDURE — 93005 ELECTROCARDIOGRAM TRACING: CPT

## 2023-04-29 PROCEDURE — 85379 FIBRIN DEGRADATION QUANT: CPT

## 2023-04-29 PROCEDURE — 36415 COLL VENOUS BLD VENIPUNCTURE: CPT

## 2023-04-29 PROCEDURE — 6360000002 HC RX W HCPCS: Performed by: EMERGENCY MEDICINE

## 2023-04-29 RX ORDER — MELOXICAM 15 MG/1
15 TABLET ORAL DAILY PRN
Qty: 30 TABLET | Refills: 0 | Status: SHIPPED | OUTPATIENT
Start: 2023-04-29 | End: 2023-04-29 | Stop reason: SDUPTHER

## 2023-04-29 RX ORDER — BENZONATATE 100 MG/1
100-200 CAPSULE ORAL 3 TIMES DAILY PRN
Qty: 60 CAPSULE | Refills: 0 | Status: SHIPPED | OUTPATIENT
Start: 2023-04-29 | End: 2023-04-29 | Stop reason: SDUPTHER

## 2023-04-29 RX ORDER — 0.9 % SODIUM CHLORIDE 0.9 %
1000 INTRAVENOUS SOLUTION INTRAVENOUS ONCE
Status: COMPLETED | OUTPATIENT
Start: 2023-04-29 | End: 2023-04-29

## 2023-04-29 RX ORDER — KETOROLAC TROMETHAMINE 15 MG/ML
15 INJECTION, SOLUTION INTRAMUSCULAR; INTRAVENOUS ONCE
Status: COMPLETED | OUTPATIENT
Start: 2023-04-29 | End: 2023-04-29

## 2023-04-29 RX ORDER — AZITHROMYCIN 250 MG/1
TABLET, FILM COATED ORAL
Qty: 1 PACKET | Refills: 0 | Status: SHIPPED | OUTPATIENT
Start: 2023-04-29 | End: 2023-05-03

## 2023-04-29 RX ORDER — AZITHROMYCIN 250 MG/1
TABLET, FILM COATED ORAL
Qty: 1 PACKET | Refills: 0 | Status: SHIPPED | OUTPATIENT
Start: 2023-04-29 | End: 2023-04-29 | Stop reason: SDUPTHER

## 2023-04-29 RX ORDER — BENZONATATE 100 MG/1
100-200 CAPSULE ORAL 3 TIMES DAILY PRN
Qty: 60 CAPSULE | Refills: 0 | Status: SHIPPED | OUTPATIENT
Start: 2023-04-29 | End: 2023-05-09

## 2023-04-29 RX ORDER — MELOXICAM 15 MG/1
15 TABLET ORAL DAILY PRN
Qty: 30 TABLET | Refills: 0 | Status: SHIPPED | OUTPATIENT
Start: 2023-04-29

## 2023-04-29 RX ADMIN — SODIUM CHLORIDE 1000 ML: 9 INJECTION, SOLUTION INTRAVENOUS at 09:56

## 2023-04-29 RX ADMIN — KETOROLAC TROMETHAMINE 15 MG: 15 INJECTION, SOLUTION INTRAMUSCULAR; INTRAVENOUS at 09:57

## 2023-04-29 ASSESSMENT — ENCOUNTER SYMPTOMS
NAUSEA: 0
SHORTNESS OF BREATH: 0
ABDOMINAL PAIN: 0
BACK PAIN: 0
VOMITING: 0
DIARRHEA: 0
SORE THROAT: 0
COUGH: 0

## 2023-04-29 ASSESSMENT — PAIN - FUNCTIONAL ASSESSMENT
PAIN_FUNCTIONAL_ASSESSMENT: NONE - DENIES PAIN
PAIN_FUNCTIONAL_ASSESSMENT: NONE - DENIES PAIN

## 2023-04-29 NOTE — ED TRIAGE NOTES
Pt a/ox3 skin w d intact  lung sounds clear pulse strongand regular  pt shows  no distress at this time  is asking for something to drink and eat

## 2023-04-29 NOTE — ED PROVIDER NOTES
[facility] \[facility]\"" ED  eMERGENCYdEPARTMENT eNCOUnter      Pt Name: Nicole Franco  MRN: [id number]  [place] 1995  Date of evaluation: 4/29/2023  Vola Goltz, MD    CHIEF COMPLAINT           HPI  Nicole Franco is a 29 y.o. male per chart review has a h/o ADHD, epilepsy, anxiety presents to the ED with chest pain. Pt notes gradual onset, moderate, constant, sharp, substernal chest pain since this am.  Pt denies fever, n/v, cough, ab pain, sob, dysuria, diarrhea. ROS  Review of Systems   Constitutional:  Negative for activity change, chills and fever. HENT:  Negative for ear pain and sore throat. Eyes:  Negative for visual disturbance. Respiratory:  Negative for cough and shortness of breath. Cardiovascular:  Positive for chest pain. Negative for palpitations and leg swelling. Gastrointestinal:  Negative for abdominal pain, diarrhea, nausea and vomiting. Genitourinary:  Negative for dysuria. Musculoskeletal:  Negative for back pain. Skin:  Negative for rash. Neurological:  Negative for dizziness and weakness. Except as noted above the remainder of the review of systems was reviewed and negative. PAST MEDICAL HISTORY     Past Medical History:   Diagnosis Date    ADHD (attention deficit hyperactivity disorder)     Anxiety     Epilepsy ([facility] [address]     Myopia     Nystagmus     Optic nerve hypoplasia          SURGICAL HISTORY     No past surgical history on file.       CURRENTMEDICATIONS       Previous Medications    ACETAMINOPHEN (TYLENOL) 325 MG TABLET    Take 650 mg by mouth every 6 hours as needed for Pain    ACETYLCYSTEINE (NAC) 600 MG CAPS    Take by mouth 2 PO QAM    ARIPIPRAZOLE (ABILIFY) 5 MG TABLET    Take 5 mg by mouth daily     ARTIFICIAL TEAR SOLUTION (GENTEAL TEARS) 0.1-0.2-0.3 % SOLN    Apply 1 drop to eye 2 times daily    BACITRACIN-POLYMYXIN B 500-14045 UNIT/GM PADS    Apply 1 each topically in the morning and at bedtime    BUSPIRONE (BUSPAR) 5 MG TABLET

## 2023-04-30 LAB
EKG ATRIAL RATE: 84 BPM
EKG P AXIS: 80 DEGREES
EKG P-R INTERVAL: 152 MS
EKG Q-T INTERVAL: 366 MS
EKG QRS DURATION: 90 MS
EKG QTC CALCULATION (BAZETT): 432 MS
EKG R AXIS: 105 DEGREES
EKG T AXIS: 45 DEGREES
EKG VENTRICULAR RATE: 84 BPM

## 2023-07-14 ENCOUNTER — TELEPHONE (OUTPATIENT)
Dept: PRIMARY CARE | Facility: CLINIC | Age: 28
End: 2023-07-14
Payer: MEDICAID

## 2023-07-14 DIAGNOSIS — F41.9 ANXIETY: ICD-10-CM

## 2023-07-14 DIAGNOSIS — F32.A DEPRESSION, UNSPECIFIED DEPRESSION TYPE: ICD-10-CM

## 2023-07-14 NOTE — TELEPHONE ENCOUNTER
Jesenia from [facility] phones the office today w/ request to have TW place a new referral for Psychiatry for patient due to his Anxiety/Depression.     Jesenia states his behaviors have been changing and his medications have been changed, he is not doing well recently.     Please advise and contact Jesenia on her direct work line for any questions/concerns at [phone].     Referral can be faxed directly to [phone]     Thank you.

## 2023-07-15 PROBLEM — R13.10 DYSPHAGIA: Status: ACTIVE | Noted: 2021-02-17

## 2023-07-15 RX ORDER — VENLAFAXINE HYDROCHLORIDE 75 MG/1
1 CAPSULE, EXTENDED RELEASE ORAL
COMMUNITY
Start: 2023-05-19 | End: 2023-10-20 | Stop reason: SDUPTHER

## 2023-07-15 RX ORDER — DESVENLAFAXINE 50 MG/1
1 TABLET, EXTENDED RELEASE ORAL
COMMUNITY
Start: 2023-05-04 | End: 2023-10-20 | Stop reason: ALTCHOICE

## 2023-07-15 RX ORDER — CHOLECALCIFEROL (VITAMIN D3) 50 MCG
TABLET ORAL
COMMUNITY
End: 2023-07-27 | Stop reason: SDUPTHER

## 2023-07-15 RX ORDER — PSEUDOEPH/DM/GUAIFEN/ACETAMIN 30-10-324
EXPECTORANT ORAL
COMMUNITY
Start: 2023-05-02 | End: 2024-04-11 | Stop reason: SDUPTHER

## 2023-07-15 RX ORDER — MULTIVITAMIN WITH FOLIC ACID 400 MCG
TABLET ORAL
COMMUNITY
Start: 2023-07-06 | End: 2024-03-21 | Stop reason: SDUPTHER

## 2023-07-15 RX ORDER — VENLAFAXINE HYDROCHLORIDE 150 MG/1
TABLET, EXTENDED RELEASE ORAL
COMMUNITY
Start: 2023-07-07 | End: 2023-07-27 | Stop reason: ALTCHOICE

## 2023-07-27 ENCOUNTER — OFFICE VISIT (OUTPATIENT)
Dept: PRIMARY CARE | Facility: CLINIC | Age: 28
End: 2023-07-27
Payer: MEDICAID

## 2023-07-27 VITALS
OXYGEN SATURATION: 98 % | HEIGHT: 66 IN | BODY MASS INDEX: 20.89 KG/M2 | RESPIRATION RATE: 16 BRPM | HEART RATE: 96 BPM | TEMPERATURE: 97.9 F | SYSTOLIC BLOOD PRESSURE: 92 MMHG | WEIGHT: 130 LBS | DIASTOLIC BLOOD PRESSURE: 56 MMHG

## 2023-07-27 DIAGNOSIS — D64.9 ANEMIA, UNSPECIFIED TYPE: ICD-10-CM

## 2023-07-27 DIAGNOSIS — E55.9 VITAMIN D DEFICIENCY: Primary | ICD-10-CM

## 2023-07-27 DIAGNOSIS — I50.22 CHRONIC SYSTOLIC (CONGESTIVE) HEART FAILURE (MULTI): ICD-10-CM

## 2023-07-27 DIAGNOSIS — R79.89 INCREASED PROLACTIN LEVEL: ICD-10-CM

## 2023-07-27 PROCEDURE — 1036F TOBACCO NON-USER: CPT | Performed by: FAMILY MEDICINE

## 2023-07-27 PROCEDURE — 99214 OFFICE O/P EST MOD 30 MIN: CPT | Performed by: FAMILY MEDICINE

## 2023-07-27 RX ORDER — CHOLECALCIFEROL (VITAMIN D3) 25 MCG
1000 TABLET ORAL DAILY
Qty: 90 TABLET | Refills: 3 | Status: SHIPPED | OUTPATIENT
Start: 2023-07-27 | End: 2024-07-26

## 2023-07-27 ASSESSMENT — ENCOUNTER SYMPTOMS
CHOKING: 0
NECK STIFFNESS: 0
HALLUCINATIONS: 0
FACIAL ASYMMETRY: 0
HEMATURIA: 0
TROUBLE SWALLOWING: 0
SLEEP DISTURBANCE: 0
PALPITATIONS: 0
WEAKNESS: 0
ABDOMINAL PAIN: 0
RHINORRHEA: 0
PHOTOPHOBIA: 0
VOMITING: 0
AGITATION: 0
EYE PAIN: 0
CONSTIPATION: 0
LIGHT-HEADEDNESS: 0
WOUND: 0
APPETITE CHANGE: 0
NUMBNESS: 0
HEMATEMESIS: 0
EYE REDNESS: 0
TREMORS: 0
BACK PAIN: 0
WEIGHT LOSS: 0
UNEXPECTED WEIGHT CHANGE: 0
FLANK PAIN: 0
BLOOD IN STOOL: 0
ABDOMINAL DISTENTION: 0
COUGH: 0
JOINT SWELLING: 0
DIZZINESS: 0
MYALGIAS: 0
FATIGUE: 0
FEVER: 0
SINUS PRESSURE: 0
POLYDIPSIA: 0
ADENOPATHY: 0
ARTHRALGIAS: 0
EYE DISCHARGE: 0
CONFUSION: 0
VOICE CHANGE: 0
SHORTNESS OF BREATH: 0
ANOREXIA: 0
SORE THROAT: 0
SPEECH DIFFICULTY: 1
WHEEZING: 0
NERVOUS/ANXIOUS: 0
DIARRHEA: 0
HEMATOCHEZIA: 0
HEADACHES: 0
BRUISES/BLEEDS EASILY: 0
DYSPHORIC MOOD: 0
LEG SWELLING: 0
CHILLS: 0
EYE ITCHING: 0
DYSURIA: 0
FREQUENCY: 0
DIAPHORESIS: 0
PARESTHESIAS: 0
NAUSEA: 0
ACTIVITY CHANGE: 0
SEIZURES: 0
CHEST TIGHTNESS: 0
NECK PAIN: 0

## 2023-07-27 NOTE — PROGRESS NOTES
Subjective   Patient ID: Kehinde Gonzalez is a 28 y.o. male who presents for Low CBC (Would like referral to Hematology ).        Patient comes to the office today for follow-up on labs.  He had labs recommended by psychiatry and hemoglobin and hematocrit were noted to be low along with white count so it was recommended that he be evaluated.  Patient himself denies any symptomatology he has not had any dark stools he has not had any tachycardia, increasing pallor, shortness of breath, fatigue, palpitations, chest pain.  Overall he feels about the same.    Anemia  Presents for follow-up visit. There has been no abdominal pain, anorexia, bruising/bleeding easily, confusion, fever, leg swelling, light-headedness, malaise/fatigue, pallor, palpitations, paresthesias, pica or weight loss. Signs of blood loss that are not present include hematemesis, hematochezia and melena. There are no compliance problems.         Review of Systems   Constitutional:  Negative for activity change, appetite change, chills, diaphoresis, fatigue, fever, malaise/fatigue, unexpected weight change and weight loss.   HENT:  Negative for congestion, ear pain, hearing loss, nosebleeds, postnasal drip, rhinorrhea, sinus pressure, sneezing, sore throat, tinnitus, trouble swallowing and voice change.    Eyes:  Negative for photophobia, pain, discharge, redness, itching and visual disturbance.   Respiratory:  Negative for cough, choking, chest tightness, shortness of breath and wheezing.    Cardiovascular:  Negative for chest pain, palpitations and leg swelling.   Gastrointestinal:  Negative for abdominal distention, abdominal pain, anorexia, blood in stool, constipation, diarrhea, hematemesis, hematochezia, melena, nausea and vomiting.   Endocrine: Negative for cold intolerance, heat intolerance, polydipsia and polyuria.   Genitourinary:  Negative for dysuria, flank pain, frequency, hematuria and urgency.   Musculoskeletal:  Positive for gait problem.  "Negative for arthralgias, back pain, joint swelling, myalgias, neck pain and neck stiffness.   Skin:  Negative for pallor, rash and wound.   Allergic/Immunologic: Negative for immunocompromised state.   Neurological:  Positive for speech difficulty. Negative for dizziness, tremors, seizures, syncope, facial asymmetry, weakness, light-headedness, numbness, headaches and paresthesias.        Developmentally disabled   Hematological:  Negative for adenopathy. Does not bruise/bleed easily.   Psychiatric/Behavioral:  Positive for behavioral problems. Negative for agitation, confusion, dysphoric mood, hallucinations, self-injury, sleep disturbance and suicidal ideas. The patient is not nervous/anxious.        Objective   BP 92/56 (BP Location: Right arm, Patient Position: Sitting, BP Cuff Size: Adult)   Pulse 96   Temp 36.6 °C (97.9 °F) (Temporal)   Resp 16   Ht 1.676 m (5' 6\")   Wt 59 kg (130 lb)   SpO2 98%   BMI 20.98 kg/m²     Physical Exam  Constitutional:       General: He is not in acute distress.     Appearance: He is not ill-appearing or diaphoretic.   HENT:      Head: Normocephalic and atraumatic.      Right Ear: External ear normal.      Left Ear: External ear normal.      Nose: Nose normal. No rhinorrhea.   Eyes:      General: Lids are normal. No scleral icterus.        Right eye: No discharge.         Left eye: No discharge.      Conjunctiva/sclera: Conjunctivae normal.   Cardiovascular:      Rate and Rhythm: Normal rate and regular rhythm.      Pulses: Normal pulses.      Heart sounds: No murmur heard.  Pulmonary:      Effort: Pulmonary effort is normal. No respiratory distress.      Breath sounds: No decreased breath sounds, wheezing, rhonchi or rales.   Abdominal:      General: Bowel sounds are normal. There is no distension.      Palpations: Abdomen is soft. There is no mass.      Tenderness: There is no abdominal tenderness. There is no guarding or rebound.   Musculoskeletal:         General: No " swelling or tenderness.      Cervical back: No rigidity or tenderness.      Right lower leg: No edema.      Left lower leg: No edema.   Lymphadenopathy:      Cervical: No cervical adenopathy.      Upper Body:      Right upper body: No supraclavicular adenopathy.      Left upper body: No supraclavicular adenopathy.   Skin:     General: Skin is warm and dry.      Coloration: Skin is not jaundiced or pale.      Findings: No erythema, lesion or rash.   Neurological:      General: No focal deficit present.      Mental Status: He is alert and oriented to person, place, and time.      Sensory: No sensory deficit.      Motor: No weakness or tremor.      Coordination: Coordination abnormal.      Gait: Gait abnormal.   Psychiatric:         Mood and Affect: Mood normal. Affect is not inappropriate.         Behavior: Behavior normal.      Comments: Garbled speech unchanged         Assessment/Plan   Diagnoses and all orders for this visit:  Vitamin D deficiency  -     cholecalciferol (Vitamin D-3) 25 MCG (1000 UT) tablet; Take 1 tablet (1,000 Units) by mouth once daily.  -     Comprehensive Metabolic Panel; Future  -     Magnesium; Future  -     Vitamin D, Total; Future  -     Follow Up In [facility] - PCP - Established; Future  Increased prolactin level  -     Prolactin level; Future  -     Follow Up In [facility] - PCP - Established; Future  Chronic systolic (congestive) heart failure (CMS/HCC)  -     CBC and Auto Differential; Future  -     Comprehensive Metabolic Panel; Future  -     Lipid Panel; Future  -     Magnesium; Future  -     TSH with reflex to Free T4 if abnormal; Future  -     Follow Up In [facility] - PCP - Established; Future  Anemia, unspecified type  -     Iron and TIBC; Future  -     Reticulocytes; Future  -     Vitamin B12; Future  -     Folate; Future  Vitamin D level is well controlled.  We will reduce hisVitamin D supplement 1000 units daily so he is not getting more than  2000 units from all sources (i.e. his multivitamin also contains vitamin D)  Patient was identified as a fall risk. Risk prevention instructions provided.    Recent labs were reviewed.  Globally what is apparent is that patient has a stable H&H and white count over the past year but he does seem to wax and wane over time.  The current levels do not appear to be significantly outside his pattern of over the past year.  I will arrange for repeat testing and anemia work-up and would recommend hematology eval only if he develops symptomatology or worsening below his baseline (taking into consideration normal baseline variability) caregiver stated understanding.  Additionally I reviewed med list with caregiver and with patient and there are concerns for several duplicate meds and several medications that might not be an active use currently they did not bring him in his current med list I would like them to fax his med list as soon as possible so we can review that and update our med list on hand.

## 2023-07-27 NOTE — PATIENT INSTRUCTIONS

## 2023-08-18 ENCOUNTER — HOSPITAL ENCOUNTER (EMERGENCY)
Age: 28
Discharge: HOME | End: 2023-08-19
Attending: EMERGENCY MEDICINE
Payer: MEDICAID

## 2023-08-18 ENCOUNTER — APPOINTMENT (OUTPATIENT)
Dept: GENERAL RADIOLOGY | Age: 28
End: 2023-08-18
Payer: MEDICAID

## 2023-08-18 DIAGNOSIS — S93.401A SPRAIN OF RIGHT ANKLE, UNSPECIFIED LIGAMENT, INITIAL ENCOUNTER: Primary | ICD-10-CM

## 2023-08-18 PROCEDURE — 6370000000 HC RX 637 (ALT 250 FOR IP): Performed by: EMERGENCY MEDICINE

## 2023-08-18 PROCEDURE — 99283 EMERGENCY DEPT VISIT LOW MDM: CPT

## 2023-08-18 PROCEDURE — 73610 X-RAY EXAM OF ANKLE: CPT

## 2023-08-18 RX ORDER — VENLAFAXINE 75 MG/1
75 TABLET ORAL DAILY
COMMUNITY

## 2023-08-18 RX ORDER — ACETAMINOPHEN 500 MG
1000 TABLET ORAL ONCE
Status: COMPLETED | OUTPATIENT
Start: 2023-08-18 | End: 2023-08-18

## 2023-08-18 RX ADMIN — ACETAMINOPHEN 1000 MG: 500 TABLET ORAL at 22:11

## 2023-08-18 ASSESSMENT — PAIN - FUNCTIONAL ASSESSMENT: PAIN_FUNCTIONAL_ASSESSMENT: 0-10

## 2023-08-18 ASSESSMENT — LIFESTYLE VARIABLES
HOW OFTEN DO YOU HAVE A DRINK CONTAINING ALCOHOL: NEVER
HOW MANY STANDARD DRINKS CONTAINING ALCOHOL DO YOU HAVE ON A TYPICAL DAY: PATIENT DOES NOT DRINK

## 2023-08-18 ASSESSMENT — ENCOUNTER SYMPTOMS
SORE THROAT: 0
BACK PAIN: 0
SHORTNESS OF BREATH: 0
EYE REDNESS: 0
VOMITING: 0
ABDOMINAL PAIN: 0
COUGH: 0
EYE DISCHARGE: 0
NAUSEA: 0

## 2023-08-18 ASSESSMENT — PAIN DESCRIPTION - LOCATION: LOCATION: ANKLE

## 2023-08-18 ASSESSMENT — PAIN DESCRIPTION - ORIENTATION: ORIENTATION: RIGHT

## 2023-08-18 ASSESSMENT — PAIN SCALES - GENERAL: PAINLEVEL_OUTOF10: 10

## 2023-08-19 VITALS
SYSTOLIC BLOOD PRESSURE: 101 MMHG | DIASTOLIC BLOOD PRESSURE: 64 MMHG | HEIGHT: 70 IN | HEART RATE: 84 BPM | WEIGHT: 145 LBS | RESPIRATION RATE: 18 BRPM | BODY MASS INDEX: 20.76 KG/M2 | OXYGEN SATURATION: 98 % | TEMPERATURE: 98.2 F

## 2023-08-19 ASSESSMENT — PAIN - FUNCTIONAL ASSESSMENT: PAIN_FUNCTIONAL_ASSESSMENT: NONE - DENIES PAIN

## 2023-08-19 NOTE — ED TRIAGE NOTES
Pt. Presents to ED with complaints of fall and right ankle pain. Pt. Sharan Benson walking over to bathroom. No LOC. Lost balance and fell hit back of head but only complains of headache. Pt. Complains of right ankle pain.

## 2023-09-07 PROBLEM — D35.2 PROLACTINOMA (HCC): Status: ACTIVE | Noted: 2023-09-07
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
## 2023-09-08 ENCOUNTER — OFFICE VISIT (OUTPATIENT)
Dept: NEUROLOGY | Age: 28
End: 2023-09-08
Payer: MEDICAID

## 2023-09-08 VITALS
WEIGHT: 133 LBS | SYSTOLIC BLOOD PRESSURE: 104 MMHG | BODY MASS INDEX: 19.08 KG/M2 | DIASTOLIC BLOOD PRESSURE: 62 MMHG | HEART RATE: 98 BPM

## 2023-09-08 DIAGNOSIS — E23.7 DISEASE OF PITUITARY GLAND (HCC): ICD-10-CM

## 2023-09-08 DIAGNOSIS — G40.409 OTHER GENERALIZED EPILEPSY, NOT INTRACTABLE, WITHOUT STATUS EPILEPTICUS (HCC): Primary | ICD-10-CM

## 2023-09-08 DIAGNOSIS — R29.91 MARFANOID HABITUS: ICD-10-CM

## 2023-09-08 DIAGNOSIS — Q04.0 AGENESIS OF CORPUS CALLOSUM (HCC): ICD-10-CM

## 2023-09-08 DIAGNOSIS — R13.10 DYSPHAGIA, UNSPECIFIED TYPE: ICD-10-CM

## 2023-09-08 DIAGNOSIS — G40.409 OTHER GENERALIZED EPILEPSY, NOT INTRACTABLE, WITHOUT STATUS EPILEPTICUS (HCC): ICD-10-CM

## 2023-09-08 DIAGNOSIS — D35.2 PROLACTINOMA (HCC): ICD-10-CM

## 2023-09-08 LAB
ALBUMIN SERPL-MCNC: 4.9 G/DL (ref 3.5–4.6)
ALP SERPL-CCNC: 132 U/L (ref 35–104)
ALT SERPL-CCNC: 33 U/L (ref 0–41)
ANION GAP SERPL CALCULATED.3IONS-SCNC: 10 MEQ/L (ref 9–15)
AST SERPL-CCNC: 25 U/L (ref 0–40)
BILIRUB SERPL-MCNC: 0.3 MG/DL (ref 0.2–0.7)
BUN SERPL-MCNC: 14 MG/DL (ref 6–20)
CALCIUM SERPL-MCNC: 9.5 MG/DL (ref 8.5–9.9)
CHLORIDE SERPL-SCNC: 104 MEQ/L (ref 95–107)
CO2 SERPL-SCNC: 25 MEQ/L (ref 20–31)
CREAT SERPL-MCNC: 0.49 MG/DL (ref 0.7–1.2)
GLOBULIN SER CALC-MCNC: 2.3 G/DL (ref 2.3–3.5)
GLUCOSE SERPL-MCNC: 85 MG/DL (ref 70–99)
POTASSIUM SERPL-SCNC: 4.6 MEQ/L (ref 3.4–4.9)
PROLACTIN SERPL-MCNC: 39.2 NG/ML (ref 4–15.2)
PROT SERPL-MCNC: 7.2 G/DL (ref 6.3–8)
SODIUM SERPL-SCNC: 139 MEQ/L (ref 135–144)
TSH REFLEX: 2.5 UIU/ML (ref 0.44–3.86)

## 2023-09-08 PROCEDURE — 99214 OFFICE O/P EST MOD 30 MIN: CPT | Performed by: PSYCHIATRY & NEUROLOGY

## 2023-09-08 ASSESSMENT — ENCOUNTER SYMPTOMS
CHOKING: 0
BACK PAIN: 0
TROUBLE SWALLOWING: 0
COLOR CHANGE: 0
SHORTNESS OF BREATH: 0
PHOTOPHOBIA: 0
VOMITING: 0
NAUSEA: 0

## 2023-09-08 NOTE — PROGRESS NOTES
05:46 AM    LDLCALC 26 05/09/2023 05:46 AM     Lab Results   Component Value Date/Time    LABAMPH PRESUMPTIVE NEGATIVE 07/04/2022 09:42 PM    LABBENZ Neg 11/02/2022 06:10 AM    LABMETH PRESUMPTIVE NEGATIVE 07/04/2022 09:42 PM    PHENCYCLIDINESCREENURINE PRESUMPTIVE NEGATIVE 07/04/2022 09:42 PM     No results found for: LITHIUM, DILFRTOT, VALPROATE    Assessment:       Diagnosis Orders   1. Other generalized epilepsy, not intractable, without status epilepticus ([address])  CBC with Auto Differential    Lamotrigine Level    Comprehensive Metabolic Panel    MRI PITUITARY W WO CONTRAST    Prolactin    TSH with Reflex    Growth Hormone    MRI BRAIN WO CONTRAST      2. 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 Disease of pituitary gland ([address])        4. Prolactinoma ([address])        5. Dysphagia, unspecified type        6. Marfanoid habitus        Generalized epilepsy without any recurrence. Patient continues on Lamictal 150 mgs bid   When last seen patient was having some ataxia and he does have agenesis of the corpus callosum and therefore I had recommended an MRI of the brain and pituitary and. The previous insertion of a prolactinoma in the past.  Labs were not done either. Patient has some degree of nystagmus as well and therefore recommended that we follow this up with MRI of the brain and treat again. Patient has marfanoid features as well. We will review him after these laboratory test and  Jona Baxter MD, Rikki Collins, American Board of Psychiatry & Neurology  Board Certified in Vascular Neurology  Board Certified in Neuromuscular Medicine  Certified in [address] [place] [address]:      Orders Placed This Encounter   Procedures    MRI PITUITARY W WO CONTRAST     Order Specific Question:   STAT Creatinine as needed:     Answer:   No     Order Specific Question:   Reason for exam:     Answer:   second request/ please liason with group home     Order Specific Question:   What is the sedation requirement?

## 2023-09-09 LAB
BASOPHILS # BLD: 0.1 K/UL (ref 0–0.2)
BASOPHILS NFR BLD: 2 %
EOSINOPHIL # BLD: 0 K/UL (ref 0–0.7)
EOSINOPHIL NFR BLD: 1 %
ERYTHROCYTE [DISTWIDTH] IN BLOOD BY AUTOMATED COUNT: 13.9 % (ref 11.5–14.5)
HCT VFR BLD AUTO: 42.5 % (ref 42–52)
HGB BLD-MCNC: 14.6 G/DL (ref 14–18)
LYMPHOCYTES # BLD: 1.1 K/UL (ref 1–4.8)
LYMPHOCYTES NFR BLD: 38 %
MCH RBC QN AUTO: 31 PG (ref 27–31.3)
MCHC RBC AUTO-ENTMCNC: 34.3 % (ref 33–37)
MCV RBC AUTO: 90.3 FL (ref 79–92.2)
MONOCYTES # BLD: 0.3 K/UL (ref 0.2–0.8)
MONOCYTES NFR BLD: 9 %
NEUTROPHILS # BLD: 1.4 K/UL (ref 1.4–6.5)
NEUTS BAND NFR BLD MANUAL: 1 %
NEUTS SEG NFR BLD: 49 %
PLATELET # BLD AUTO: 214 K/UL (ref 130–400)
PLATELET BLD QL SMEAR: ADEQUATE
RBC # BLD AUTO: 4.71 M/UL (ref 4.7–6.1)
RBC MORPH BLD: NORMAL
WBC # BLD AUTO: 2.8 K/UL (ref 4.8–10.8)

## 2023-09-10 LAB — LAMOTRIGINE SERPL-MCNC: 5.4 UG/ML (ref 3–15)

## 2023-09-11 ENCOUNTER — OFFICE VISIT (OUTPATIENT)
Dept: PRIMARY CARE | Facility: CLINIC | Age: 28
End: 2023-09-11
Payer: MEDICAID

## 2023-09-11 VITALS
WEIGHT: 137 LBS | DIASTOLIC BLOOD PRESSURE: 80 MMHG | HEART RATE: 101 BPM | TEMPERATURE: 97.5 F | SYSTOLIC BLOOD PRESSURE: 118 MMHG | HEIGHT: 66 IN | OXYGEN SATURATION: 97 % | RESPIRATION RATE: 18 BRPM | BODY MASS INDEX: 22.02 KG/M2

## 2023-09-11 DIAGNOSIS — S93.401A SPRAIN OF RIGHT ANKLE, UNSPECIFIED LIGAMENT, INITIAL ENCOUNTER: Primary | ICD-10-CM

## 2023-09-11 LAB — GHRH SERPL-MCNC: 1.4 NG/ML (ref 0.05–3)

## 2023-09-11 PROCEDURE — 99212 OFFICE O/P EST SF 10 MIN: CPT | Performed by: PHYSICIAN ASSISTANT

## 2023-09-11 PROCEDURE — 1036F TOBACCO NON-USER: CPT | Performed by: PHYSICIAN ASSISTANT

## 2023-09-11 ASSESSMENT — ENCOUNTER SYMPTOMS: ARTHRALGIAS: 1

## 2023-09-11 NOTE — PROGRESS NOTES
"Subjective   Patient ID: Kehinde Gonzalez is a 28 y.o. male who presents for Follow-up (Dr Lagunas pt here today for a [facility] follow up from 8/18 for right ankle sprain. Pt states doing better and walking good on it. Pt needs a note to go back to work. ).    HPI     Right ankle sprain  It was wrapped in a brace and was off work for a week and iced it   Better now   Wants to get back to work       Review of Systems   Musculoskeletal:  Positive for arthralgias.       Objective   /80   Pulse 101   Temp 36.4 °C (97.5 °F)   Resp 18   Ht 1.676 m (5' 6\")   Wt 62.1 kg (137 lb)   SpO2 97%   BMI 22.11 kg/m²     Physical Exam  Constitutional:       General: He is not in acute distress.     Appearance: Normal appearance. He is not ill-appearing.   HENT:      Head: Normocephalic.   Cardiovascular:      Rate and Rhythm: Normal rate and regular rhythm.      Pulses: Normal pulses.      Heart sounds: Normal heart sounds. No murmur heard.  Pulmonary:      Effort: Pulmonary effort is normal.      Breath sounds: Normal breath sounds.   Musculoskeletal:         General: No swelling or tenderness. Normal range of motion.      Right lower leg: No edema.      Left lower leg: No edema.   Neurological:      Mental Status: He is alert.   Psychiatric:         Mood and Affect: Mood normal.         Behavior: Behavior normal.         Assessment/Plan   Problem List Items Addressed This Visit    None  Visit Diagnoses       Sprain of right ankle, unspecified ligament, initial encounter    -  Primary        - Sxs have mostly resolved  - encouraged continue with exercises for ankle mobility and strengthening  - Cleared to return to work as of today        "

## 2023-10-09 PROBLEM — R79.89 ELEVATED PROLACTIN LEVEL: Status: ACTIVE | Noted: 2023-10-09

## 2023-10-09 PROBLEM — D35.2 PROLACTINOMA (MULTI): Status: ACTIVE | Noted: 2023-09-07

## 2023-10-09 PROBLEM — Z72.89 SELF-INJURIOUS BEHAVIOR: Status: ACTIVE | Noted: 2023-10-09

## 2023-10-09 PROBLEM — G40.909 SEIZURE DISORDER (MULTI): Status: ACTIVE | Noted: 2023-10-09

## 2023-10-09 PROBLEM — E23.7: Status: ACTIVE | Noted: 2023-10-09

## 2023-10-09 PROBLEM — I51.9 LEFT VENTRICULAR DYSFUNCTION: Status: ACTIVE | Noted: 2022-02-18

## 2023-10-10 ENCOUNTER — APPOINTMENT (OUTPATIENT)
Dept: BEHAVIORAL HEALTH | Facility: CLINIC | Age: 28
End: 2023-10-10
Payer: MEDICAID

## 2023-10-17 ENCOUNTER — TELEMEDICINE (OUTPATIENT)
Dept: BEHAVIORAL HEALTH | Facility: CLINIC | Age: 28
End: 2023-10-17
Payer: MEDICAID

## 2023-10-17 DIAGNOSIS — F91.9 DISRUPTIVE BEHAVIOR DISORDER: ICD-10-CM

## 2023-10-17 NOTE — PROGRESS NOTES
"Send Nurse follow-up email where to find epic link.  When no answer, also sent a Zoom blank.   This clinician waited on both feelings until 9:15 AM for his 8:45 AM appointment.      ASSESSMENT: Mr. Gonzalez presents with one episode of self-interest behavior with the decrease of venlafaxine. In the last month self-interest behavior has decreased in frequency, duration remains the same 20 minutes, and intensity has increased. He has \"clawed his face and then states that it feels good.\"  This episode was over not wanting to wait for bowl of ice cream while staff finished taking care of of other tasks.  See treatment plan below.     Pharmacogenomics Testing (PGT) June 2021 results reviewed:  Normal Folic Acid Conversion  Use As Directed: Desvenlafaxine (Pristiq), Buspirone (BuSpar), & sertraline (Zoloft)  Moderate gene to drug interaction: Risperdal with the clinical consideration that serum level may be too low and higher doses may be required.     PLAN:                                                                                                                         problems treated   f/u requested to prevent relapse   medications renewed/re-ordered     Serum levels     1. Continue venlafaxine HCL ER 75 mg by mouth every morning for anxiety.  2. Continue BuSpar 5 mg by mouth q AM and 5 mg by mouth at 3 PM for ROBIN.  3. Continue risperidone 3 mg by mouth twice daily for impulse control disorder and behaviors. He was previously on 5 mg daily and was not effective. 7/14/22 Discussed lab results. Medication counseling and reviewed prolactinemia hx, staff, Guardian and ct were aware and accepted responsibility. HX prolactinemia November 2022: 56ng/mL (4-15). Reports asymptomatic.  4. Continue sertraline (Zoloft) 200 mg by mouth daily for anxiety AEB frustration & resentment towards peers.   5. Return to Clinic Tuesday October 10, 2023 at 8:45 AM or earlier if needed. Call [phone] to reschedule.  6. Message sent " "to intake to schedule appointment with Carmelita Camacho for Therapy.  7. Sertraline, risperidone, and BuSpar serum levels given. No sertraline, risperidone, nor BuSpar for at least 12 hours prior to lab draw. Fax  Amanda     If you have any questions or concerns, do not hesitate to contact my office.  Tamanna Romano     TREATMENT TYPE                                                                                                  counseling and coordination of care addressing signs and symptoms of illness; risks/benefits and side effects of medications; and behavioral approaches to illness.  This note was created using electronic dictation. There may be errors in syntax and meaning. Please contact the office with any questions.   For [facility] residents, [employer] is a 24/7 hotline you can call for assistance [[phone]].   Please call 911 or go to your closest Emergency Room if you feel worse. This includes thoughts of hurting yourself or anyone else, or having other troubles such as hearing voices, seeing visions, or having new and scary thoughts about the people around you.      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      Last seen August 2023.  At that time, no medication changes.  July 2023. At that time, venlafaxine was decreased.  June 26, 2023. At that time, venlafaxine was increased and timing on BuSpar was adjusted (from 12 hours to 8 hours).  June 1, 2023. At that time, no medication changes. On venlafaxine HCL ER for 11 days and was his last day on Clonazepam.   May 2023. At that time, venlafaxine HCL ER was started.   March 2023. At that time, clonazepam was decreased " due to possible disinhibition.  December 2022. At that time, no medication changes.  November 2022. At that time, BuSpar was started.  October 2022. At that time, no medication changes.  July 2022. At that time, Risperdal was restarted per request for skin excoriation, Ziprasidone and Colace were discontinued.  May 2022. At that time, sertraline (Zoloft) was increased.  April 2022: sertraline was increased.   February 23, 2022. At that time, Sertraline was increased.  February 1, 2022. At that time, NAC was DC'd, Ziprasidone (Geodon) & Clonazepam were increased.  January 5, 2022. At that time, Geodon (Ziprasidone) was started   November 2021. At that time, Lurasidone (Latuda) was increased.  September 2021. At that time, Abilify was DC'd (not effective) and Latuda was started (chosen dt new cardiac concerns).  August 2021. At that time, sertraline (ZoloftÂ®) was increased.   June 2021. At that time, fluoxetine (Prozac) was DC'd & sertraline (ZoloftÂ®) was started.   May 2021. At that time, Abilify was increased.  April 2021. At that time, no medication changes.  February 2021. At that time, no medication changes.  January 28, 2021. At that time, no medication changes.  January 5, 2021. At that time, Klonopin was decreased, Abilify & Colace were started.  November 2020. At that time, NAC was increased.   October 2020. At that time, Klonopin was decreased and Fluoxetine was increased.   August 2020. At that time, no medication changes.  July 2020. At that time, NAC was increased.  May 2020. At that time, no medication changes.  January 2020. At that time, no medication changes.  November 2019. At that time, NAC was increased. Jan 2020, Risperdal was DC'd.   September. At that time, Risperdal was increased and NAC was started.   Aug 2019. At that time, fluoxetine was increased and Risperdal was decreased.  June 2019. At that time, fluoxetine was increased and Risperdal was decreased.  April 2019. At that time, no med  "changes.     Cheko reports that he has been working and staying busy.  Had a [place] in the pav[place] with his family yesterday.  States he feels and gets along pretty good lately.  Staff report SIB 45 min of pounding on head in frustration after peer involvement, also hitting self more.   No de-escalation would work. Hitting dents into the wall.   Repeats kill myself verbals.     He visits his Mom and goes to [place]. He enjoys going there, especially for the cats. One Calico cat, Maribell, sleeps on his chest.  5/20/23 started taking venlafaxine HCL ER. Significant change in behaviors: worse: hitting self and others more. Threatening to harm and kill himself more. Increased behaviors surrounding wanting to eat more. Describes as increased obsessions with food. Increased SIB: scratching (scratches on face) & hitting self (sores on back of head). Property destruction: broke his iPad. Closer to hitting others. Increased frustration level with longer, more difficult periods to calm down. Redirection no longer works. IE: shoe fell off; took 30+ minutes to calm.  \"Angry with self constantly\"  Considering changing Counselors due to pattern of talking. Sees therapist weekly.  Seriousness of targeting peers with more cognitive issues, communication concerns and those with WC or walkers. Has never gone away.      Cheko reports that his sleep \"on and off.\" With help from staff, sleeping through the night, with 1 awakening to use RR and back to sleep.  If not busy, he will lay in recliner and sleep.   He has been staying busy. Doing extra work.  [facility]- frustration with losing sight. Requiring holding onto staff's arm in unfamiliar places. [place] more hope after apt. Adaptive piece to magnify iPad.   Deficits causing increase in depression. Resorts to saying he is going to kill himself at any frustration.     Ripped off right middle toenail.  Targets 4 peers in the home, Drastic shift in moods from irritable to " laughing.     4/29/23 [facility] ER in [place] for bronchitis  PHQ 9 on 5/4/23 score 15 moderately severe  ROBIN 7 on 5/4/23 score 16 severe anxiety     his grandma passed away November 2022. It's been hard, but he looks at FB pictures and distracts by talking to staff and working.     tries to find ways to control having anxiety attacks:         7/4/22 SIB violent hitting head, threatening to kill self & others, 911 called and squadded to  ER.      Continues with cardiac services. On 3 heart medications  Appetite decreased when anxious     Infatuation with others dirty attends w/sexual arousal  Increased urinary incontinence; especially when frustrated & in the night     He has had both COVID vaccinations.  Resides: [facility]. [facility] since October 5, 2021. His home has 7 same-age male peers. Shares room w/1 male peer. Caregiver reports that he still remains angry 90% of the time. Irritability with threatening, punching walls, kicking items, and waving his fists/arm swings.     He utilizes headphones, which appear to be noise canceling. Tries to use his iPad, but having difficulty.  SIB. Pounds the sided of his temples, punch and kick the door, only when he does something he feels wrong. IE: drops something or gets something wrong.  If he runs into the wall, he will want to punch the wall.  Towards others: will raise his arm like he is going to punch them.     MEDICATIONS: Past: Ritalin- effect unknown.  Adderall, Strattera and Concerta caused emotional changesâ€“moodiness.  Sertraline (Zoloft)â€“appeared to be ineffective.  With the start of BuSpar, staff note improvement in fighting with others, anger, verbal threats towards others and self. Continues to have irritability towards same peer. Staff report skin excoriation improved. No open areas: fingers, back of head, nose and in ear.   Abilify- minimal improvement in irritably  ziprasidone (Geodon)- had not yet been maximized to its full potential. Declined  "increase. Med education provided that this was not a medication failure. May need to consider using again in the future.  Psy/SI/HI/aggression: Denies A/VH, paranoia. Denies SI/HI  Continuous talking appears to be ongoing since prior to age 9.  SIB: easily frustrated. skin excoriation on fingers.      Appetite: good. 2016. Dx dysphagia velopharyngeal insufficiency. Seen & evaluation by otolaryngic Dr. New Mahmood. No concerns noted & recommended speech therapy. Chopped tough meats.   Daily schedule: [employer], since September 2019. He comes home happy.      History of 2-infantile surgeries:Hypospadius & inguinal hernia and hydrocele for repair.  Med Physicians:  PCP:Dr. Smith: routine & PRN visits.   Endocrinology: Dr. Castano from [facility].   Genetics: Dr. Marlen Rogers. Denied DX of Marfan Syndrome & Ruiz- Fryns Syndrome  Cardiologist: Dr. Sonny Thomas. Started carvedilol for Left ventricular dysfunction.  Evaluated y Dr. Amara Bean, on 5/13/21  Dentist: Routine.   Hx of excessive saliva. Most likely dt keeping his head down, chin on chest. With the DC of Risperdal, Cheko reports that he no longer bites the inside of his cheek. He also seems to be able to enunciate better.   Eye: Wears bifocals   Neurologist: last seen in 2016. Appears primary care doctor prescribes Lamictal for epilepsy. Unknown last seizure. Per old chart, seizures started at approximately 8 years of age. Has upward eye rolling with facial twitching and fist clenching. EEG at that time, mother reported was normal.  Neurologist diagnosed ADHD; \"Fidgety boy who bounces,\" history of impulsiveness with darting, issues keeping his hands to himself, growling behaviors, a worrier who is bothered by noises from the smoke detector or a fire alarm. He insists on things being arranged in a certain way, somewhat fearful above low tolerance and is easily stressed.     ALLERGIES: NKDA     MEDICAL REVIEW OF SYSTEMS:  GEN: denies fever, chills, night " "sweats  HEENT: denies changes in vision, eye pain, hearing changes, no symptoms of upper respiratory infection  CARDIO: denies chest pain and palpitations   RESP: denies shortness of breath  GI: denies nausea/vomiting/constipation/diarrhea, or blood in the stool  : denies burning/frequency/urgency, or bloody urine  NEURO: denies tremor, dizziness, numbness or tingling. Hx infantile seizures  MSK: denies muscle spasms or stiffness. See 'Communication\" below. Sits with torso leaning to left, difficult for him to sit straight & when head is looking at this writer, left ear tilted towards his left shoulder. Asked ct to stand: Left shoulder approx 3 inches higher than right. Lumbar Lordosis noted.  Suspect Structural scoliosis  DERM: denies new onset of rash     Med SE: none noted or reported     COMPLIANCE: good     MMS:  ORIENTATION   alert  ambulatory  cooperative   dysmorphic features     BEHAVIOR:  enters easily  eye contact normal  gait normal  reciprocal interaction  spontaneous speech     MEMORY:  poor remote  poor recent     SENSORY :  Bifocal glasses     COMMUNICATION:  conversational  speech dysarthria- very difficult to understand. Per chart: nasal talk. Improved with Risperdal DC.   Facial asymmetrical. Entire left side of face lower than right. Question if part of speech issue.   Staff state stroke has been ruled out.     AFFECT:  normal/full     MOOD: anxious      THOUGHT PROCESS:  concrete   perseverative      THOUGHT Content:  obsessive     CONCENTRATION  Easily distracted     FUND OF KNOWLEDGE :  mod disability- fx at approx age 6 yo     JUDGEMENT: posed situations     EPS: none noted or reported.   AIMS negative     LABS REVIEWED:  May 2023 in chart  November 2022 prolactin 56 NG/mL (4â€“15)  Oct 2020:  TSH, Thyroid, T4, prolactin & Cortisol- WNL  October 2019:  CBC/Diff: WBC 3.7; RBC 4.35  TSH, Lipid, & Vitamin D (58)- WNL  CMP: Alk phos 106; creatinine 0.57, Globulin 2.2      Feb 2019: reviewed. " "WBC, RBC, H/H were low at that time.  2016 old labs reviewed     EKG = sees cardiologist  July 2022 in chart  May 2021  73 bpm  QTc 412  May 2019:   85 bpm  QTc 435   History of Present Illnessthe following information has been gathered by client report, caregiver report, and old chart. Specifically, note dated February 28, 2005 by neurologist Dr. Cortez.     Family history: - Psychiatric diagnosis: denies primary relatives with serious mental illness (SA) and/or substance use disorders  Parents: Mother Ktahi Marquez [phone]. Contacts with iPad & phone, Step DadDano   Siblings: none  Personal history:   Birth: was 5 lbs. 2 oz. product of 34â€“35 weeks gestation. he had a short umbilical cord. He was in the hospital for 1 month, difficulties with sucking. On the third day of life, MRI showed agenesis of corpus callosum. later in life tested negative for Prader-Willi genetic abnormality. No other known chromosomal analysis done.  Development: per past report by mother, at age 10, he was functioning at a 4â€“5-year-old level. he states talked at 1.5 years and walked at 4 years. Began talking at age 3. No noted developmental regression.  School:   Occupations: Previously at [employer]. Did not like, and has been at [employer] since he was 18.  Sexual:denies  Abuse: denies  Orientation: A & O x 3  Marriage/partner: none  Habits  Alcohol: denies  Tobacco: denies  Drugs: denies  Prescribed**  Recreational; \"I don't really do much.\" does have [place] eyeglass gear on. Caregiver reports that he likes the roller coasters.  Christian: Goes to [employer]. Identifies as Judaism.  Past psychiatric: No MH hosp   Scores and Scales        Facial and Oral Movements:   Muscles of Facial Expression eg. movements of forehead, eyebrows, periorbital area, cheeks; include frowning, blinking, smiling, grimacing: None normal (0).   Lips and Perioral Area eg. puckering, pouting, smacking: None normal (0).   Jaw eg. " biting, clenching, chewing, mouth opening, lateral movement: None normal (0).   Tongue. Rate only increases in movement both in and out of mouth, NOT inability to sustain movement None normal (0).   Extremity Movements:   Upper (arms, wrists, hands, fingers). Include chronic movements ( ie, rapid, objectively purposeless, irregular, spontaneous); athetoid movements (ie, slow, irregular, complex, serpentine). DO NOT include tremor (ie, repetitive, regular, rythmic): None normal (0).   Lower (legs, knees, ankles, toes) eg, lateral knee movement, foot tapping, heel dropping, foot squirming, inversion and eversion of foot: None normal (0).   Trunk Movements:   Neck, shoulders, hips. eg, rocking, twisting, squirming, pelvic gyrations: None normal (0).      Overall Severity:   Severity of abnormal movements: None normal (0).   Incapacitation due to abnormal movements: None normal (0).   Patient's awareness of abnormal movements (rate only patient's report): No awareness (0).   Dental Status:   Current porblems with teeth and/or dentures: No.   Does patient usually wear dentures: No.   Comments: 5/4/23.   Examiner Name: Tamanna Garnett.         PHQ-9 Depression Scale:   1. Little interest or pleasure in doing things - several days   2. Feeling down, depressed or hopeless - more than half the days   3. Trouble falling asleep or sleeping too much - not at all   4. Feeling tired or having little energy - not at all   5. Poor appetite or overeating - not at all   6. Feeling bad about self or failure or letting others down - nearly every day   7. Trouble concentrating on things - nearly every day   8. Moving / speaking slowly or fidgety / restless - nearly every day   9. Thought would be better off dead or hurting self - nearly every day   Total Score: 15/27   Severity of depression is moderately severe.   How difficult have these problems made it for you to do your work, take care of things at home, or get along with people?  Very Difficult.         Generalized Anxiety Disorder 7 - Item Scale (ROBIN-7): Over the last 2 weeks, how often have you been bothered by the following problems?   Feeling nervous, anxious or on edge: Nearly every day = 3   Not being able to stop or control worrying: Nearly every day = 3   Worrying too much about different things: More than half the days = 2   Trouble relaxing: Several days = 1   Being so restless that it is hard to sit still: Several days = 1   Becoming easily annoyed or irritable: Nearly every day = 3   Feeling afraid as if something awful might happen: Nearly every day = 3      Total score: 16.

## 2023-10-19 ENCOUNTER — TELEPHONE (OUTPATIENT)
Dept: NEUROLOGY | Age: 28
End: 2023-10-19

## 2023-10-19 ENCOUNTER — HOSPITAL ENCOUNTER (OUTPATIENT)
Dept: MRI IMAGING | Age: 28
Discharge: HOME | End: 2023-10-21
Attending: PSYCHIATRY & NEUROLOGY
Payer: MEDICAID

## 2023-10-19 ENCOUNTER — HOSPITAL ENCOUNTER (OUTPATIENT)
Dept: MRI IMAGING | Age: 28
End: 2023-10-19
Attending: PSYCHIATRY & NEUROLOGY
Payer: MEDICAID

## 2023-10-19 DIAGNOSIS — G40.409 OTHER GENERALIZED EPILEPSY, NOT INTRACTABLE, WITHOUT STATUS EPILEPTICUS (HCC): ICD-10-CM

## 2023-10-19 DIAGNOSIS — G40.409 OTHER GENERALIZED EPILEPSY, NOT INTRACTABLE, WITHOUT STATUS EPILEPTICUS (HCC): Primary | ICD-10-CM

## 2023-10-19 PROCEDURE — A9579 GAD-BASE MR CONTRAST NOS,1ML: HCPCS | Performed by: PSYCHIATRY & NEUROLOGY

## 2023-10-19 PROCEDURE — 6360000004 HC RX CONTRAST MEDICATION: Performed by: PSYCHIATRY & NEUROLOGY

## 2023-10-19 PROCEDURE — 70553 MRI BRAIN STEM W/O & W/DYE: CPT | Performed by: PSYCHIATRY & NEUROLOGY

## 2023-10-19 PROCEDURE — 70551 MRI BRAIN STEM W/O DYE: CPT

## 2023-10-19 RX ADMIN — GADOTERIDOL 15 ML: 279.3 INJECTION, SOLUTION INTRAVENOUS at 13:29

## 2023-10-20 ENCOUNTER — TELEMEDICINE (OUTPATIENT)
Dept: BEHAVIORAL HEALTH | Facility: CLINIC | Age: 28
End: 2023-10-20
Payer: MEDICAID

## 2023-10-20 DIAGNOSIS — F41.9 ANXIETY: ICD-10-CM

## 2023-10-20 DIAGNOSIS — F71 MODERATE INTELLECTUAL DISABILITY: ICD-10-CM

## 2023-10-20 DIAGNOSIS — F91.9 DISRUPTIVE BEHAVIOR DISORDER: ICD-10-CM

## 2023-10-20 DIAGNOSIS — Z72.89 SELF-INJURIOUS BEHAVIOR: ICD-10-CM

## 2023-10-20 PROCEDURE — 99214 OFFICE O/P EST MOD 30 MIN: CPT | Performed by: NURSE PRACTITIONER

## 2023-10-20 RX ORDER — VENLAFAXINE HYDROCHLORIDE 75 MG/1
75 CAPSULE, EXTENDED RELEASE ORAL
Qty: 30 CAPSULE | Refills: 2 | Status: SHIPPED | OUTPATIENT
Start: 2023-10-20 | End: 2024-02-20 | Stop reason: ALTCHOICE

## 2023-10-20 RX ORDER — BUSPIRONE HYDROCHLORIDE 5 MG/1
5 TABLET ORAL 2 TIMES DAILY
Qty: 60 TABLET | Refills: 2 | Status: SHIPPED | OUTPATIENT
Start: 2023-10-20 | End: 2023-11-20 | Stop reason: SDUPTHER

## 2023-10-20 RX ORDER — RISPERIDONE 3 MG/1
3 TABLET ORAL 2 TIMES DAILY
Qty: 60 TABLET | Refills: 2 | Status: SHIPPED | OUTPATIENT
Start: 2023-10-20 | End: 2023-11-20 | Stop reason: SDUPTHER

## 2023-10-20 NOTE — PROGRESS NOTES
"ASSESSMENT: Mr. Gonzalez presents with continued low frustration tolerance resulting in self-injurious behavior that he reports \"feels good\".  Upon review of the low sertraline serum level, discussed possibility of switching both antidepressants.  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  Therefore, may need to consider other medications outside of SSRI or SNRI.  Changes pending results of the last 2 serum levels.  See treatment plan below.     Pharmacogenomics Testing (PGT) June 2021 results reviewed:  Normal Folic Acid Conversion  Use As Directed: Desvenlafaxine (Pristiq), Buspirone (BuSpar), & sertraline (Zoloft)  Moderate gene to drug interaction: Risperdal with the clinical consideration that serum level may be too low and higher doses may be required.     PLAN:                                                                                                                         problems treated   f/u requested to prevent relapse   medications renewed/re-ordered     Serum levels     1. Continue desvenlafaxine  50 mg by mouth every morning for anxiety.  2. Continue BuSpar 5 mg by mouth q AM and 5 mg by mouth at 3 PM for ROBIN.  3. Continue risperidone 3 mg by mouth twice daily for impulse control disorder and behaviors. He was previously on 5 mg daily and was not effective. 7/14/22 Discussed lab results. Medication counseling and reviewed prolactinemia hx, staff, Guardian and ct were aware and accepted responsibility. HX prolactinemia November 2022: 56ng/mL (4-15). Reports asymptomatic.  4. Continue sertraline (Zoloft) 200 mg by mouth daily for anxiety AEB frustration & resentment towards peers.   5. Return to Clinic Tuesday October 10, 2023 at 8:45 AM or earlier if needed. Call [phone] to reschedule.  6. Message re-sent to intake to schedule appointment with Carmelita Camacho for Therapy.  7.  Staff to obtain incidents to this clinician's office: Risperidone, and BuSpar serum levels. " "Fax  Amanda   Sertraline serum: 16 ng/ml therapeutic range .  If you have any questions or concerns, do not hesitate to contact my office.  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      Last seen August 2023. At that time, no medication changes.  July 2023. At that time, venlafaxine was decreased.  June 26, 2023. At that time, venlafaxine was increased and timing on BuSpar was adjusted (from 12 hours to 8 hours).  June 1, 2023. At that time, no medication changes. On venlafaxine HCL ER for 11 days and was his last day on Clonazepam.   May 2023. At that time, venlafaxine HCL ER was started.   March 2023. At that time, clonazepam was decreased due to possible disinhibition.  December 2022. At that time, no medication changes.  November 2022. At that time, BuSpar was started.  October 2022. At that time, no medication changes.  July 2022. At that time, Risperdal was restarted per request for skin excoriation, Ziprasidone and Colace were discontinued.  May 2022. At that time, sertraline (Zoloft) was increased.  April 2022: sertraline was increased.   February 23, 2022. At that time, Sertraline was increased.  February 1, 2022. At that time, NAC was DC'd, Ziprasidone (Geodon) & Clonazepam were increased.  January 5, 2022. At that time, Geodon (Ziprasidone) was started   November 2021. At that time, Lurasidone (Latuda) was increased.  September 2021. At that time, Abilify was DC'd (not effective) and Latuda was started (chosen dt new cardiac concerns).  August 2021. At that time, sertraline (ZoloftÂ®) was increased.   June 2021. At that time, fluoxetine (Prozac) was DC'd & sertraline " "(ZoloftÂ®) was started.   May 2021. At that time, Abilify was increased.  April 2021. At that time, no medication changes.  February 2021. At that time, no medication changes.  January 28, 2021. At that time, no medication changes.  January 5, 2021. At that time, Klonopin was decreased, Abilify & Colace were started.  November 2020. At that time, NAC was increased.   October 2020. At that time, Klonopin was decreased and Fluoxetine was increased.   August 2020. At that time, no medication changes.  July 2020. At that time, NAC was increased.  May 2020. At that time, no medication changes.  January 2020. At that time, no medication changes.  November 2019. At that time, NAC was increased. Jan 2020, Risperdal was DC'd.   September. At that time, Risperdal was increased and NAC was started.   Aug 2019. At that time, fluoxetine was increased and Risperdal was decreased.  June 2019. At that time, fluoxetine was increased and Risperdal was decreased.  April 2019. At that time, no med changes.     Cheko reports that he is \"good\".  Staff concerned with SIB with injury: scratches on face, hits his head, and will pull off his own finger/toe nails if bothering him.  All day \"Bullying\" of peer that is lower functioning (not able to defend self against Cheko).  has been working and staying busy.  Had a picnic in the pavilion with his family yesterday.  States he feels and gets along pretty good lately.  Staff report SIB 45 min of pounding on head in frustration after peer involvement, also hitting self more.   No de-escalation would work. Hitting dents into the wall.   Repeats kill myself verbals.     He visits his Mom and goes to [place]. He enjoys going there, especially for the cats. One Calico cat, Maribell, sleeps on his chest.  5/20/23 started taking venlafaxine HCL ER. Significant change in behaviors: worse: hitting self and others more. Threatening to harm and kill himself more. Increased behaviors surrounding wanting " "to eat more. Describes as increased obsessions with food. Increased SIB: scratching (scratches on face) & hitting self (sores on back of head). Property destruction: broke his iPad. Closer to hitting others. Increased frustration level with longer, more difficult periods to calm down. Redirection no longer works. IE: shoe fell off; took 30+ minutes to calm.  \"Angry with self constantly\"  Considering changing Counselors due to pattern of talking. Sees therapist weekly.  Seriousness of targeting peers with more cognitive issues, communication concerns and those with WC or walkers. Has never gone away.      Cheko reports that his sleep \"on and off.\" With help from staff, sleeping through the night, with 1 awakening to use RR and back to sleep.  If not busy, he will lay in recliner and sleep.   He has been staying busy. Doing extra work.  [facility]- frustration with losing sight. Requiring holding onto staff's arm in unfamiliar places. [place] more hope after apt. Adaptive piece to magnify iPad.   Deficits causing increase in depression. Resorts to saying he is going to kill himself at any frustration.     Ripped off right middle toenail.  Targets 4 peers in the home, Drastic shift in moods from irritable to laughing.     4/29/23 [facility] ER in [place] for bronchitis  PHQ 9 on 5/4/23 score 15 moderately severe  ROBIN 7 on 5/4/23 score 16 severe anxiety     his grandma passed away November 2022. It's been hard, but he looks at FB pictures and distracts by talking to staff and working.     tries to find ways to control having anxiety attacks:         7/4/22 SIB violent hitting head, threatening to kill self & others, 911 called and squadded to  ER.      Continues with cardiac services. On 3 heart medications  Appetite decreased when anxious     Infatuation with others dirty attends w/sexual arousal  Increased urinary incontinence; especially when frustrated & in the night     He has had both COVID " vaccinations.  Resides: [place] since October 5, 2021. His home has 7 same-age male peers. Shares room w/1 male peer. Caregiver reports that he still remains angry 90% of the time. Irritability with threatening, punching walls, kicking items, and waving his fists/arm swings.     He utilizes headphones, which appear to be noise canceling. Tries to use his iPad, but having difficulty.  SIB. Pounds the sided of his temples, punch and kick the door, only when he does something he feels wrong. IE: drops something or gets something wrong.  If he runs into the wall, he will want to punch the wall.  Towards others: will raise his arm like he is going to punch them.     MEDICATIONS: Past: Ritalin- effect unknown.  Adderall, Strattera and Concerta caused emotional changesâ€“moodiness.  Sertraline (Zoloft)â€“appeared to be ineffective.  With the start of BuSpar, staff note improvement in fighting with others, anger, verbal threats towards others and self. Continues to have irritability towards same peer. Staff report skin excoriation improved. No open areas: fingers, back of head, nose and in ear.   Abilify- minimal improvement in irritably  ziprasidone (Geodon)- had not yet been maximized to its full potential. Declined increase. Med education provided that this was not a medication failure. May need to consider using again in the future.  Psy/SI/HI/aggression: Denies A/VH, paranoia. Denies SI/HI  Continuous talking appears to be ongoing since prior to age 9.  SIB: easily frustrated. skin excoriation on fingers.      Appetite: good. 2016. Dx dysphagia velopharyngeal insufficiency. Seen & evaluation by otolaryngic Dr. New Mahmood. No concerns noted & recommended speech therapy. Chopped tough meats.   Daily schedule: [employer], since September 2019. He comes home happy.      History of 2-infantile surgeries:Hypospadius & inguinal hernia and hydrocele for repair.  Med Physicians:  PCP:Dr. Smith: routine &  "PRN visits.   Endocrinology: Dr. Castano from [facility].   Genetics: Dr. Marlen Rogers. Denied DX of Marfan Syndrome & Ruiz- Fryns Syndrome  Cardiologist: Dr. Sonny Thomas. Started carvedilol for Left ventricular dysfunction.  Evaluated y Dr. Amara Bean, on 5/13/21  Dentist: Routine.   Hx of excessive saliva. Most likely dt keeping his head down, chin on chest. With the DC of Risperdal, Cheko reports that he no longer bites the inside of his cheek. He also seems to be able to enunciate better.   Eye: Wears bifocals   Neurologist: last seen in 2016. Appears primary care doctor prescribes Lamictal for epilepsy. Unknown last seizure. Per old chart, seizures started at approximately 8 years of age. Has upward eye rolling with facial twitching and fist clenching. EEG at that time, mother reported was normal.  Neurologist diagnosed ADHD; \"Fidgety boy who bounces,\" history of impulsiveness with darting, issues keeping his hands to himself, growling behaviors, a worrier who is bothered by noises from the smoke detector or a fire alarm. He insists on things being arranged in a certain way, somewhat fearful above low tolerance and is easily stressed.     ALLERGIES: NKDA     MEDICAL REVIEW OF SYSTEMS:  GEN: denies fever, chills, night sweats  HEENT: denies changes in vision, eye pain, hearing changes, no symptoms of upper respiratory infection  CARDIO: denies chest pain and palpitations   RESP: denies shortness of breath  GI: denies nausea/vomiting/constipation/diarrhea, or blood in the stool  : denies burning/frequency/urgency, or bloody urine  NEURO: denies tremor, dizziness, numbness or tingling. Hx infantile seizures  MSK: denies muscle spasms or stiffness. See 'Communication\" below. Sits with torso leaning to left, difficult for him to sit straight & when head is looking at this writer, left ear tilted towards his left shoulder. Asked ct to stand: Left shoulder approx 3 inches higher than right. Lumbar Lordosis " noted.  Suspect Structural scoliosis  DERM: denies new onset of rash     Med SE: none noted or reported     COMPLIANCE: good     MMS:  ORIENTATION   alert  ambulatory  cooperative   dysmorphic features     BEHAVIOR:  enters easily  eye contact normal  gait normal  reciprocal interaction  spontaneous speech     MEMORY:  poor remote  poor recent     SENSORY :  Bifocal glasses     COMMUNICATION:  conversational  speech dysarthria- very difficult to understand. Per chart: nasal talk. Improved with Risperdal DC.   Facial asymmetrical. Entire left side of face lower than right. Question if part of speech issue.   Staff state stroke has been ruled out.     AFFECT:  normal/full     MOOD: anxious      THOUGHT PROCESS:  concrete   perseverative      THOUGHT Content:  obsessive     CONCENTRATION  Easily distracted     FUND OF KNOWLEDGE :  mod disability- fx at approx age 8 yo     JUDGEMENT: posed situations     EPS: none noted or reported.   AIMS negative     LABS REVIEWED:  May 2023 in chart  November 2022 prolactin 56 NG/mL (4â€“15)  Oct 2020:  TSH, Thyroid, T4, prolactin & Cortisol- WNL  October 2019:  CBC/Diff: WBC 3.7; RBC 4.35  TSH, Lipid, & Vitamin D (58)- WNL  CMP: Alk phos 106; creatinine 0.57, Globulin 2.2      Feb 2019: reviewed. WBC, RBC, H/H were low at that time.  2016 old labs reviewed     EKG = sees cardiologist  July 2022 in chart  May 2021  73 bpm  QTc 412  May 2019:   85 bpm  QTc 435

## 2023-11-20 ENCOUNTER — TELEMEDICINE (OUTPATIENT)
Dept: BEHAVIORAL HEALTH | Facility: CLINIC | Age: 28
End: 2023-11-20
Payer: MEDICAID

## 2023-11-20 VITALS
HEIGHT: 67 IN | SYSTOLIC BLOOD PRESSURE: 101 MMHG | TEMPERATURE: 97.1 F | WEIGHT: 133 LBS | RESPIRATION RATE: 20 BRPM | BODY MASS INDEX: 20.88 KG/M2 | HEART RATE: 97 BPM | DIASTOLIC BLOOD PRESSURE: 69 MMHG

## 2023-11-20 DIAGNOSIS — F41.9 ANXIETY: ICD-10-CM

## 2023-11-20 DIAGNOSIS — Z72.89 SELF-INJURIOUS BEHAVIOR: ICD-10-CM

## 2023-11-20 DIAGNOSIS — Z79.899 ENCOUNTER FOR LONG-TERM (CURRENT) USE OF HIGH-RISK MEDICATION: ICD-10-CM

## 2023-11-20 DIAGNOSIS — F91.9 DISRUPTIVE BEHAVIOR DISORDER: ICD-10-CM

## 2023-11-20 PROCEDURE — 99214 OFFICE O/P EST MOD 30 MIN: CPT | Performed by: NURSE PRACTITIONER

## 2023-11-20 RX ORDER — BUSPIRONE HYDROCHLORIDE 5 MG/1
TABLET ORAL
Qty: 60 TABLET | Refills: 2 | Status: SHIPPED | OUTPATIENT
Start: 2023-11-20 | End: 2024-02-20 | Stop reason: SDUPTHER

## 2023-11-20 RX ORDER — SERTRALINE HYDROCHLORIDE 100 MG/1
200 TABLET, FILM COATED ORAL DAILY
Qty: 60 TABLET | Refills: 5 | Status: SHIPPED | OUTPATIENT
Start: 2023-11-20 | End: 2024-02-20

## 2023-11-20 RX ORDER — RISPERIDONE 3 MG/1
3 TABLET ORAL 2 TIMES DAILY
Qty: 60 TABLET | Refills: 2 | Status: SHIPPED | OUTPATIENT
Start: 2023-11-20 | End: 2024-02-20 | Stop reason: SDUPTHER

## 2023-11-20 RX ORDER — DIVALPROEX SODIUM 500 MG/1
500 TABLET, FILM COATED, EXTENDED RELEASE ORAL NIGHTLY
Qty: 30 TABLET | Refills: 2 | Status: SHIPPED | OUTPATIENT
Start: 2023-11-20 | End: 2023-11-20 | Stop reason: SDUPTHER

## 2023-11-20 RX ORDER — DESVENLAFAXINE 50 MG/1
50 TABLET, EXTENDED RELEASE ORAL DAILY
Qty: 30 TABLET | Refills: 2 | Status: SHIPPED | OUTPATIENT
Start: 2023-11-20 | End: 2024-02-20 | Stop reason: SDUPTHER

## 2023-11-20 RX ORDER — DIVALPROEX SODIUM 500 MG/1
500 TABLET, FILM COATED, EXTENDED RELEASE ORAL NIGHTLY
Qty: 30 TABLET | Refills: 2 | Status: SHIPPED | OUTPATIENT
Start: 2023-11-20 | End: 2024-02-20 | Stop reason: SDUPTHER

## 2023-11-20 NOTE — PROGRESS NOTES
"ASSESSMENT: Mr. Gonzalez presents with continued low frustration tolerance resulting in self-injurious behavior that he reports \"feels good\".  Upon review of the low sertraline serum level, discussed possibility of switching both antidepressants.  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  Therefore, may need to consider other medications outside of SSRI or SNRI.  However, TCA in severe gene to drug column as well.  See treatment plan below.     Pharmacogenomics Testing (PGT) June 2021 results reviewed:  Normal Folic Acid Conversion  Use As Directed: Desvenlafaxine (Pristiq), Buspirone (BuSpar), & sertraline (Zoloft) and Divalproex Sodium (Depakote).   Moderate gene to drug interaction: Risperdal with the clinical consideration that serum level may be too low and higher doses may be required.     PLAN:                                                                                                                         problems treated   f/u requested to prevent relapse   medications renewed/re-ordered          1. Start Divalproex Sodium (Depakote) 500 mg by mouth at HS for moods. Staff aware that will elevate Lamictal level.  Continue desvenlafaxine  50 mg by mouth every morning for anxiety.  2. Continue BuSpar 5 mg by mouth q AM and 5 mg by mouth at 3 PM for ROBIN.  3. Continue risperidone 3 mg by mouth twice daily for impulse control disorder and behaviors. He was previously on 5 mg daily and was not effective. 7/14/22 Discussed lab results. Medication counseling and reviewed prolactinemia hx, staff, Guardian and ct were aware and accepted responsibility. HX prolactinemia November 2022: 56ng/mL (4-15). Reports asymptomatic.  4. Continue sertraline (Zoloft) 200 mg by mouth daily for anxiety AEB frustration & resentment towards peers. Next apt DC. Sertraline serum: 16 ng/ml therapeutic range . Even doubling (above max recommendation) would bring barely to bottom of low range.  5. Return to " "Clinic 2-3 months or earlier if needed. Call [phone] to reschedule.  6.  Serum levels ordered, to be completed in 2 weeks for Depakote, Lamictal, Risperidone, and BuSpar serum levels. Hold all 4 meds for at least 10 hours prior to lab draw.    If you have any questions or concerns, do not hesitate to contact my office.  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      Last seen October 2023.  At that time, no medication changes due to pending lab results.  August 2023. At that time, no medication changes.  July 2023. At that time, venlafaxine was decreased.  June 26, 2023. At that time, venlafaxine was increased and timing on BuSpar was adjusted (from 12 hours to 8 hours).  June 1, 2023. At that time, no medication changes. On venlafaxine HCL ER for 11 days and was his last day on Clonazepam.   May 2023. At that time, venlafaxine HCL ER was started.   March 2023. At that time, clonazepam was decreased due to possible disinhibition.  December 2022. At that time, no medication changes.  November 2022. At that time, BuSpar was started.  October 2022. At that time, no medication changes.  July 2022. At that time, Risperdal was restarted per request for skin excoriation, Ziprasidone and Colace were discontinued.  May 2022. At that time, sertraline (Zoloft) was increased.  April 2022: sertraline was increased.   February 23, 2022. At that time, Sertraline was increased.  February 1, 2022. At that time, NAC was DC'd, Ziprasidone (Geodon) & Clonazepam were increased.  January 5, 2022. At that time, Geodon (Ziprasidone) was started   November 2021. At that time, Lurasidone (Latuda) was increased.  September 2021. At that time, Abilify was " "DC'd (not effective) and Latuda was started (chosen dt new cardiac concerns).  August 2021. At that time, sertraline (ZoloftÂ®) was increased.   June 2021. At that time, fluoxetine (Prozac) was DC'd & sertraline (ZoloftÂ®) was started.   May 2021. At that time, Abilify was increased.  April 2021. At that time, no medication changes.  February 2021. At that time, no medication changes.  January 28, 2021. At that time, no medication changes.  January 5, 2021. At that time, Klonopin was decreased, Abilify & Colace were started.  November 2020. At that time, NAC was increased.   October 2020. At that time, Klonopin was decreased and Fluoxetine was increased.   August 2020. At that time, no medication changes.  July 2020. At that time, NAC was increased.  May 2020. At that time, no medication changes.  January 2020. At that time, no medication changes.  November 2019. At that time, NAC was increased. Jan 2020, Risperdal was DC'd.   September. At that time, Risperdal was increased and NAC was started.   Aug 2019. At that time, fluoxetine was increased and Risperdal was decreased.  June 2019. At that time, fluoxetine was increased and Risperdal was decreased.  April 2019. At that time, no med changes.     Cheko reports that he is \"good\".  Staff concerned with SIB with injury: scratches on face, hits his head, and will pull off his own finger/toe nails if bothering him.  All day \"Bullying\" of peer that is lower functioning (not able to defend self against Cheko).  has been working and staying busy.  Had a [place] in the pavilion with his family yesterday.  States he feels and gets along pretty good lately.  Staff report SIB 45 min of pounding on head in frustration after peer involvement, also hitting self more.   No de-escalation would work. Hitting dents into the wall.   Repeats kill myself verbals.     He visits his Mom and goes to [place]. He enjoys going there, especially for the cats. One Calico cat, Maribell, sleeps " "on his chest.  5/20/23 started taking venlafaxine HCL ER. Significant change in behaviors: worse: hitting self and others more. Threatening to harm and kill himself more. Increased behaviors surrounding wanting to eat more. Describes as increased obsessions with food. Increased SIB: scratching (scratches on face) & hitting self (sores on back of head). Property destruction: broke his iPad. Closer to hitting others. Increased frustration level with longer, more difficult periods to calm down. Redirection no longer works. IE: shoe fell off; took 30+ minutes to calm.  \"Angry with self constantly\"  Considering changing Counselors due to pattern of talking. Sees therapist weekly.  Seriousness of targeting peers with more cognitive issues, communication concerns and those with WC or walkers. Has never gone away.      Cheko reports that his sleep \"on and off.\" With help from staff, sleeping through the night, with 1 awakening to use RR and back to sleep.  If not busy, he will lay in recliner and sleep.   He has been staying busy. Doing extra work.  [facility]- frustration with losing sight. Requiring holding onto staff's arm in unfamiliar places. [place] more hope after apt. Adaptive piece to magnify iPad.   Deficits causing increase in depression. Resorts to saying he is going to kill himself at any frustration.     Ripped off right middle toenail.  Targets 4 peers in the home, Drastic shift in moods from irritable to laughing.     4/29/23 [facility] ER in [place] for bronchitis  PHQ 9 on 5/4/23 score 15 moderately severe  ROBIN 7 on 5/4/23 score 16 severe anxiety     his grandma passed away November 2022. It's been hard, but he looks at FB pictures and distracts by talking to staff and working.     tries to find ways to control having anxiety attacks:         7/4/22 SIB violent hitting head, threatening to kill self & others, 911 called and squadded to  ER.      Continues with cardiac services. On 3 heart " medications  Appetite decreased when anxious     Infatuation with others dirty attends w/sexual arousal  Increased urinary incontinence; especially when frustrated & in the night     He has had both COVID vaccinations.  [facility]. His home has 7 same-age male peers. Shares room w/1 male peer. Caregiver reports that he still remains angry 90% of the time. Irritability with threatening, punching walls, kicking items, and waving his fists/arm swings.     He utilizes headphones, which appear to be noise canceling. Tries to use his iPad, but having difficulty.  SIB. Pounds the sided of his temples, punch and kick the door, only when he does something he feels wrong. IE: drops something or gets something wrong.  If he runs into the wall, he will want to punch the wall.  Towards others: will raise his arm like he is going to punch them.     MEDICATIONS: Past: Ritalin- effect unknown.  Adderall, Strattera and Concerta caused emotional changesâ€“moodiness.  Sertraline (Zoloft)â€“appeared to be ineffective.  With the start of BuSpar, staff note improvement in fighting with others, anger, verbal threats towards others and self. Continues to have irritability towards same peer. Staff report skin excoriation improved. No open areas: fingers, back of head, nose and in ear.   Abilify- minimal improvement in irritably  ziprasidone (Geodon)- had not yet been maximized to its full potential. Declined increase. Med education provided that this was not a medication failure. May need to consider using again in the future.  Psy/SI/HI/aggression: Denies A/VH, paranoia. Denies SI/HI  Continuous talking appears to be ongoing since prior to age 9.  SIB: easily frustrated. skin excoriation on fingers.      Appetite: good. 2016. Dx dysphagia velopharyngeal insufficiency. Seen & evaluation by otolaryngic Dr. New Mahmood. No concerns noted & recommended speech therapy. Chopped tough meats.   Daily  "schedule: [employer], since September 2019. He comes home happy.      History of 2-infantile surgeries:Hypospadius & inguinal hernia and hydrocele for repair.  Med Physicians:  PCP:Dr. Smith: routine & PRN visits.   Endocrinology: Dr. Castano from [facility].   Genetics: Dr. Marlen Rogers. Denied DX of Marfan Syndrome & Ruiz- Fryns Syndrome  Cardiologist: Dr. Sonny Thomas. Started carvedilol for Left ventricular dysfunction.  Evaluated y Dr. Amara Bean, on 5/13/21  Dentist: Routine.   Hx of excessive saliva. Most likely dt keeping his head down, chin on chest. With the DC of Risperdal, Cheko reports that he no longer bites the inside of his cheek. He also seems to be able to enunciate better.   Eye: Wears bifocals   Neurologist: last seen in 2016. Appears primary care doctor prescribes Lamictal for epilepsy. Unknown last seizure. Per old chart, seizures started at approximately 8 years of age. Has upward eye rolling with facial twitching and fist clenching. EEG at that time, mother reported was normal.  Neurologist diagnosed ADHD; \"Fidgety boy who bounces,\" history of impulsiveness with darting, issues keeping his hands to himself, growling behaviors, a worrier who is bothered by noises from the smoke detector or a fire alarm. He insists on things being arranged in a certain way, somewhat fearful above low tolerance and is easily stressed.     ALLERGIES: NKDA     MEDICAL REVIEW OF SYSTEMS:  GEN: denies fever, chills, night sweats  HEENT: denies changes in vision, eye pain, hearing changes, no symptoms of upper respiratory infection  CARDIO: denies chest pain and palpitations   RESP: denies shortness of breath  GI: denies nausea/vomiting/constipation/diarrhea, or blood in the stool  : denies burning/frequency/urgency, or bloody urine  NEURO: denies tremor, dizziness, numbness or tingling. Hx infantile seizures  MSK: denies muscle spasms or stiffness. See 'Communication\" below. Sits with torso leaning to " left, difficult for him to sit straight & when head is looking at this writer, left ear tilted towards his left shoulder. Asked ct to stand: Left shoulder approx 3 inches higher than right. Lumbar Lordosis noted.  Suspect Structural scoliosis  DERM: denies new onset of rash     Med SE: none noted or reported     COMPLIANCE: good     MMS:  ORIENTATION   alert  ambulatory  cooperative   dysmorphic features     BEHAVIOR:  enters easily  eye contact normal  gait normal  reciprocal interaction  spontaneous speech     MEMORY:  poor remote  poor recent     SENSORY :  Bifocal glasses     COMMUNICATION:  conversational  speech dysarthria- very difficult to understand. Per chart: nasal talk. Improved with Risperdal DC.   Facial asymmetrical. Entire left side of face lower than right. Question if part of speech issue.   Staff state stroke has been ruled out.     AFFECT:  normal/full     MOOD: anxious      THOUGHT PROCESS:  concrete   perseverative      THOUGHT Content:  obsessive     CONCENTRATION  Easily distracted     FUND OF KNOWLEDGE :  mod disability- fx at approx age 6 yo     JUDGEMENT: posed situations     EPS: none noted or reported.   AIMS negative     LABS REVIEWED:  May 2023 in chart  November 2022 prolactin 56 NG/mL (4â€“15)  Oct 2020:  TSH, Thyroid, T4, prolactin & Cortisol- WNL  October 2019:  CBC/Diff: WBC 3.7; RBC 4.35  TSH, Lipid, & Vitamin D (58)- WNL  CMP: Alk phos 106; creatinine 0.57, Globulin 2.2      Feb 2019: reviewed. WBC, RBC, H/H were low at that time.  2016 old labs reviewed     EKG = sees cardiologist  July 2022 in chart  May 2021  73 bpm  QTc 412  May 2019:   85 bpm  QTc 435

## 2024-01-10 ENCOUNTER — TELEMEDICINE (OUTPATIENT)
Dept: BEHAVIORAL HEALTH | Facility: CLINIC | Age: 29
End: 2024-01-10
Payer: MEDICAID

## 2024-01-10 DIAGNOSIS — Z72.89 SELF-INJURIOUS BEHAVIOR: ICD-10-CM

## 2024-01-10 DIAGNOSIS — F91.9 DISRUPTIVE BEHAVIOR DISORDER: ICD-10-CM

## 2024-01-10 DIAGNOSIS — F41.9 ANXIETY: ICD-10-CM

## 2024-01-10 PROCEDURE — 90791 PSYCH DIAGNOSTIC EVALUATION: CPT | Performed by: COUNSELOR

## 2024-01-10 NOTE — PROGRESS NOTES
Total Time: 33 min   Diagnosis: Disruptive behavior disorder, Self-injurious behavior, Anxiety  Visit Type: epic  Reason for visit: new, managing his frustrations    Disruptive behavior disorder, Self-injurious behavior, Anxiety  Jenny (nurse): needs help with managing his emotions, frustrations, even something as small as don't put that there, hell call himself dumb and hit himself, hell get in others space and pretend to hit, will pound on his computer, picks wall, hits himself pretty hard, bullys his house mates. Does facetime with mom and music on his tablet, but otherwise not a lot of coping. Mom is minimally involved, dad isn't involved anymore either (moved?) so he doesn't really get to see him anymore. Staff report he is impulsive. 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 cleaning  Lives in a group home, including him there are 8 total, all guys  Sleep: reports to sleep well  Eating: reports he eats well  Meds: (noted he has a heart problem) reports they help  Therapy before: he doesn't think so  Happy: when the cats purr  What makes you sad or mad: he said, oh boy, I don't know  Coping: tries to listen to music ([place] and Protestant)  Family: sees mom, “rest of the family”, when asked about brothers/sister, he said not really  Guardian: mom  What has been bothering you: frustration, its “constantly, every day”, when asked what frustrates him, he said everything. What do you do; he said “I shit myself or say Im going to kill myself” when asked if he really wants to hurt himself, he didn't really answer and changed topics  Hospitalization- “many times, here and there” but then he talked about spraining his ankle  Dating: no, and when asked if he wants to, he said “Im good for now”  Hoping to gain: manage his weight, less frustrated, feel less anxious  When asked what  makes him anxious, he said it depends on what he is doing, hates ramirez, also hates winter  Notes he likes to play with his ipad  When asked about his impulses/decision making: he notes he thinks he makes great decisions  When asked about hurting himself or others: he stated no        Will want to work on:  - emotional identification, expression, regulation  - CBT related to his anger outbursts, changing his thoughts to change his BX related to his frustrations and worry  - coping skills, building relationships, decision making (STOP, think)

## 2024-02-02 ENCOUNTER — APPOINTMENT (OUTPATIENT)
Dept: CARDIOLOGY | Facility: CLINIC | Age: 29
End: 2024-02-02
Payer: MEDICAID

## 2024-02-07 ENCOUNTER — TELEMEDICINE (OUTPATIENT)
Dept: BEHAVIORAL HEALTH | Facility: CLINIC | Age: 29
End: 2024-02-07
Payer: MEDICAID

## 2024-02-07 DIAGNOSIS — F91.9 DISRUPTIVE BEHAVIOR DISORDER: ICD-10-CM

## 2024-02-07 DIAGNOSIS — F41.9 ANXIETY: ICD-10-CM

## 2024-02-07 DIAGNOSIS — Z72.89 SELF-INJURIOUS BEHAVIOR: ICD-10-CM

## 2024-02-07 PROCEDURE — 90832 PSYTX W PT 30 MINUTES: CPT | Performed by: COUNSELOR

## 2024-02-07 PROCEDURE — 1036F TOBACCO NON-USER: CPT | Performed by: COUNSELOR

## 2024-02-07 NOTE — PROGRESS NOTES
"Total Time: 32 min  Diagnosis: Disruptive behavior disorder, Self-injurious behavior, Anxiety  Visit Type: epic  Reason for visit: managing his worry which can lead to disruptive BX    - when asked how he is, he stated good, busy, busy with work  - he notes he is going to see his family this week and will see their animals and he talked about his work animals and how much he loves them  - talked some about graduating high school and “not wanting to go back” and that his mom got remarried around that time  - when asked about his frustrations he said he is still, when asked why, he said at work, that some people “drive him nuts”; when asked if its different or same people, he said its different each week  - notes he was sick about a week ago, fever, not feeling well   - spent some time talking about his picking (had a scar on his forehead)    focused on:  - continued to build rapport  - started to focus on his emotional identification and starting to work on thought stopping, exploring new coping skills, deep breathing, change his thoughts      - notes he is trying to \"walk away and go outside\"; talked about cleaning which puts him in a good mood, we talked about watching cat videos or holding a stuffed cat    "

## 2024-02-20 ENCOUNTER — OFFICE VISIT (OUTPATIENT)
Dept: BEHAVIORAL HEALTH | Facility: CLINIC | Age: 29
End: 2024-02-20
Payer: MEDICAID

## 2024-02-20 VITALS
DIASTOLIC BLOOD PRESSURE: 98 MMHG | BODY MASS INDEX: 20.88 KG/M2 | HEIGHT: 67 IN | TEMPERATURE: 98.4 F | RESPIRATION RATE: 17 BRPM | SYSTOLIC BLOOD PRESSURE: 122 MMHG | WEIGHT: 133 LBS | HEART RATE: 93 BPM

## 2024-02-20 DIAGNOSIS — Z72.89 SELF-INJURIOUS BEHAVIOR: ICD-10-CM

## 2024-02-20 DIAGNOSIS — F41.9 ANXIETY: Primary | ICD-10-CM

## 2024-02-20 DIAGNOSIS — Z79.899 ENCOUNTER FOR LONG-TERM (CURRENT) USE OF HIGH-RISK MEDICATION: ICD-10-CM

## 2024-02-20 DIAGNOSIS — F91.9 DISRUPTIVE BEHAVIOR DISORDER: ICD-10-CM

## 2024-02-20 PROCEDURE — 1036F TOBACCO NON-USER: CPT | Performed by: NURSE PRACTITIONER

## 2024-02-20 PROCEDURE — 99214 OFFICE O/P EST MOD 30 MIN: CPT | Performed by: NURSE PRACTITIONER

## 2024-02-20 RX ORDER — RISPERIDONE 3 MG/1
3 TABLET ORAL 2 TIMES DAILY
Qty: 60 TABLET | Refills: 2 | Status: SHIPPED | OUTPATIENT
Start: 2024-02-20 | End: 2024-04-30 | Stop reason: SDUPTHER

## 2024-02-20 RX ORDER — DESVENLAFAXINE 50 MG/1
50 TABLET, EXTENDED RELEASE ORAL DAILY
Qty: 30 TABLET | Refills: 2 | Status: SHIPPED | OUTPATIENT
Start: 2024-02-20 | End: 2024-04-30 | Stop reason: SDUPTHER

## 2024-02-20 RX ORDER — BUSPIRONE HYDROCHLORIDE 5 MG/1
TABLET ORAL
Qty: 60 TABLET | Refills: 2 | Status: SHIPPED | OUTPATIENT
Start: 2024-02-20 | End: 2024-04-30 | Stop reason: SDUPTHER

## 2024-02-20 RX ORDER — DIVALPROEX SODIUM 500 MG/1
500 TABLET, FILM COATED, EXTENDED RELEASE ORAL NIGHTLY
Qty: 30 TABLET | Refills: 2 | Status: SHIPPED | OUTPATIENT
Start: 2024-02-20 | End: 2024-04-30 | Stop reason: SDUPTHER

## 2024-02-20 RX ORDER — SERTRALINE HYDROCHLORIDE 100 MG/1
TABLET, FILM COATED ORAL
Qty: 11 TABLET | Refills: 0 | Status: SHIPPED | OUTPATIENT
Start: 2024-02-20 | End: 2024-04-11 | Stop reason: ALTCHOICE

## 2024-02-20 NOTE — PATIENT INSTRUCTIONS
ASSESSMENT: Mr. Gonzalez presents at baseline mental health wise.  Upon review of the low sertraline serum level, discussed possibility of switching both antidepressants.  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  Therefore, may need to consider other medications outside of SSRI or SNRI.  However, TCA in severe gene to drug column as well.  See treatment plan below.     Pharmacogenomics Testing (PGT) June 2021 results reviewed:  Normal Folic Acid Conversion  Use As Directed: Desvenlafaxine (Pristiq), Buspirone (BuSpar), & sertraline (Zoloft) and Divalproex Sodium (Depakote).   Moderate gene to drug interaction: Risperdal with the clinical consideration that serum level may be too low and higher doses may be required.     PLAN:                                                                                                                      problems treated   f/u requested to prevent relapse   medications renewed/re-ordered     1. Taper off: sertraline (Zoloft) 100 mg by mouth daily x 1 week, then decrease to 50 mg by mouth daily x 1 week, then DC.  Sertraline serum: 16 ng/ml therapeutic range . Even doubling (above max recommendation) would bring barely to bottom of low range.  2. Continue BuSpar 5 mg by mouth q AM and 5 mg by mouth at 3 PM for ROBIN.  3. Continue risperidone 3 mg by mouth twice daily for impulse control disorder and behaviors. He was previously on 5 mg daily and was not effective. 7/14/22 Discussed lab results. Medication counseling and reviewed prolactinemia hx, staff, Guardian and ct were aware and accepted responsibility. HX prolactinemia November 2022: 56ng/mL (4-15). Reports asymptomatic.  4. Continue Divalproex Sodium (Depakote) 500 mg by mouth at HS for moods.   5. Continue desvenlafaxine  50 mg by mouth every morning for anxiety.  6.  Risks, benefits, alternatives, off-label uses, and side effects of medications have been discussed with patient/caregiver. There  is no report of signs/symptoms consistent with medication-induced impairment in daily functioning. At this time, benefits of medication felt to outweigh potential risks. Will continue to reassess need for psychotropic medication at regular 3-month intervals.  7. Return to clinic 1 month or earlier if needed. Call [phone] to reschedule.           Thank you for seeing me today. If you have any questions or concerns, do not hesitate to contact my office.  Tamanna Romano    TREATMENT TYPE         counseling and coordination of care; addressing signs and symptoms of illness; risks/benefits and side effects of medications; and behavioral approaches to illness.  This note was created using electronic dictation. There may be errors in syntax and meaning. Please contact the office with any questions.  For [facility] residents, [employer] is a 24/7 hotline you can call for assistance [[phone]].   Please call 911 or go to your closest Emergency Room if you feel worse. This includes thoughts of hurting yourself or anyone else, or having other troubles such as hearing voices, seeing visions, or having new and scary thoughts about the people around you.

## 2024-02-20 NOTE — PROGRESS NOTES
ASSESSMENT: Mr. Gonzalez presents at baseline mental health wise.  Upon review of the low sertraline serum level, discussed possibility of switching both antidepressants.  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  Therefore, may need to consider other medications outside of SSRI or SNRI.  However, TCA in severe gene to drug column as well.  See treatment plan below.     Pharmacogenomics Testing (PGT) June 2021 results reviewed:  Normal Folic Acid Conversion  Use As Directed: Desvenlafaxine (Pristiq), Buspirone (BuSpar), & sertraline (Zoloft) and Divalproex Sodium (Depakote).   Moderate gene to drug interaction: Risperdal with the clinical consideration that serum level may be too low and higher doses may be required.     PLAN:                                                                                                                      problems treated   f/u requested to prevent relapse   medications renewed/re-ordered     1. Taper off: sertraline (Zoloft) 100 mg by mouth daily x 1 week, then decrease to 50 mg by mouth daily x 1 week, then DC.  Sertraline serum: 16 ng/ml therapeutic range . Even doubling (above max recommendation) would bring barely to bottom of low range.  2. Continue BuSpar 5 mg by mouth q AM and 5 mg by mouth at 3 PM for ROBIN.  3. Continue risperidone 3 mg by mouth twice daily for impulse control disorder and behaviors. He was previously on 5 mg daily and was not effective. 7/14/22 Discussed lab results. Medication counseling and reviewed prolactinemia hx, staff, Guardian and ct were aware and accepted responsibility. HX prolactinemia November 2022: 56ng/mL (4-15). Reports asymptomatic.  4. Continue Divalproex Sodium (Depakote) 500 mg by mouth at HS for moods.   5. Continue desvenlafaxine  50 mg by mouth every morning for anxiety.  6.  Risks, benefits, alternatives, off-label uses, and side effects of medications have been discussed with patient/caregiver. There  "is no report of signs/symptoms consistent with medication-induced impairment in daily functioning. At this time, benefits of medication felt to outweigh potential risks. Will continue to reassess need for psychotropic medication at regular 3-month intervals.  7. Return to clinic 1 month or earlier if needed. Call [phone] to reschedule.           Thank you for seeing me today. If you have any questions or concerns, do not hesitate to contact my office.  Tamanna Romano    TREATMENT TYPE         counseling and coordination of care; addressing signs and symptoms of illness; risks/benefits and side effects of medications; and behavioral approaches to illness.  This note was created using electronic dictation. There may be errors in syntax and meaning. Please contact the office with any questions.  For [facility] residents, [employer] is a 24/7 hotline you can call for assistance [[phone]].   Please call 911 or go to your closest Emergency Room if you feel worse. This includes thoughts of hurting yourself or anyone else, or having other troubles such as hearing voices, seeing visions, or having new and scary thoughts about the people around you.       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      Last seen November 2023. At that time, Depakote was started.   October 2023.  At that time, no medication changes due to pending lab results.  August 2023. At that time, no medication changes.  July 2023. At that time, venlafaxine was decreased.  June 26, 2023. At that time, venlafaxine was increased and timing on BuSpar was adjusted (from 12 hours to 8 hours).  June 1, 2023. At that time, no medication changes. On venlafaxine HCL ER for 11 days " and was his last day on Clonazepam.   May 2023. At that time, venlafaxine HCL ER was started.   March 2023. At that time, clonazepam was decreased due to possible disinhibition.  December 2022. At that time, no medication changes.  November 2022. At that time, BuSpar was started.  October 2022. At that time, no medication changes.  July 2022. At that time, Risperdal was restarted per request for skin excoriation, Ziprasidone and Colace were discontinued.  May 2022. At that time, sertraline (Zoloft) was increased.  April 2022: sertraline was increased.   February 23, 2022. At that time, Sertraline was increased.  February 1, 2022. At that time, NAC was DC'd, Ziprasidone (Geodon) & Clonazepam were increased.  January 5, 2022. At that time, Geodon (Ziprasidone) was started   November 2021. At that time, Lurasidone (Latuda) was increased.  September 2021. At that time, Abilify was DC'd (not effective) and Latuda was started (chosen dt new cardiac concerns).  August 2021. At that time, sertraline (ZoloftÂ®) was increased.   June 2021. At that time, fluoxetine (Prozac) was DC'd & sertraline (ZoloftÂ®) was started.   May 2021. At that time, Abilify was increased.  April 2021. At that time, no medication changes.  February 2021. At that time, no medication changes.  January 28, 2021. At that time, no medication changes.  January 5, 2021. At that time, Klonopin was decreased, Abilify & Colace were started.  November 2020. At that time, NAC was increased.   October 2020. At that time, Klonopin was decreased and Fluoxetine was increased.   August 2020. At that time, no medication changes.  July 2020. At that time, NAC was increased.  May 2020. At that time, no medication changes.  January 2020. At that time, no medication changes.  November 2019. At that time, NAC was increased. Jan 2020, Risperdal was DC'd.   September. At that time, Risperdal was increased and NAC was started.   Aug 2019. At that time, fluoxetine was  "increased and Risperdal was decreased.  June 2019. At that time, fluoxetine was increased and Risperdal was decreased.  April 2019. At that time, no med changes.     Cheko reports that he is \"good\".  Staff concerned with SIB with injury: scratches on face, hits his head, and will pull off his own finger/toe nails if bothering him.  All day \"Bullying\" of peer that is lower functioning (not able to defend self against Cheko).  has been working and staying busy.  Had a picnic in the pav[place] with his family yesterday.  States he feels and gets along pretty good lately.  Staff report SIB 45 min of pounding on head in frustration after peer involvement, also hitting self more.   No de-escalation would work. Hitting dents into the wall.   Repeats kill myself verbals.     He visits his Mom and goes to [place]. He enjoys going there, especially for the cats. One Calico cat, Maribell, sleeps on his chest.  5/20/23 started taking venlafaxine HCL ER. Significant change in behaviors: worse: hitting self and others more. Threatening to harm and kill himself more. Increased behaviors surrounding wanting to eat more. Describes as increased obsessions with food. Increased SIB: scratching (scratches on face) & hitting self (sores on back of head). Property destruction: broke his iPad. Closer to hitting others. Increased frustration level with longer, more difficult periods to calm down. Redirection no longer works. IE: shoe fell off; took 30+ minutes to calm.  \"Angry with self constantly\"  Considering changing Counselors due to pattern of talking. Sees therapist weekly.  Seriousness of targeting peers with more cognitive issues, communication concerns and those with WC or walkers. Has never gone away.      Cheko reports that his sleep \"on and off.\" With help from staff, sleeping through the night, with 1 awakening to use RR and back to sleep.  If not busy, he will lay in recliner and sleep.   He has been staying busy. Doing extra " work.  [facility]- frustration with losing sight. Requiring holding onto staff's arm in unfamiliar places. [place] more hope after apt. Adaptive piece to magnify iPad.   Deficits causing increase in depression. Resorts to saying he is going to kill himself at any frustration.     Ripped off right middle toenail.  Targets 4 peers in the home, Drastic shift in moods from irritable to laughing.     4/29/23 [facility] ER in [place] for bronchitis  PHQ 9 on 5/4/23 score 15 moderately severe  ROBIN 7 on 5/4/23 score 16 severe anxiety     his grandma passed away November 2022. It's been hard, but he looks at FB pictures and distracts by talking to staff and working.     tries to find ways to control having anxiety attacks:         7/4/22 SIB violent hitting head, threatening to kill self & others, 911 called and squadded to  ER.      Continues with cardiac services. On 3 heart medications  Appetite decreased when anxious     Infatuation with others dirty attends w/sexual arousal  Increased urinary incontinence; especially when frustrated & in the night     He has had both COVID vaccinations.  Resides: [facility]. [facility] since October 5, 2021. His home has 7 same-age male peers. Shares room w/1 male peer. Caregiver reports that he still remains angry 90% of the time. Irritability with threatening, punching walls, kicking items, and waving his fists/arm swings.     He utilizes headphones, which appear to be noise canceling. Tries to use his iPad, but having difficulty.  SIB. Pounds the sided of his temples, punch and kick the door, only when he does something he feels wrong. IE: drops something or gets something wrong.  If he runs into the wall, he will want to punch the wall.  Towards others: will raise his arm like he is going to punch them.     MEDICATIONS: Past: Ritalin- effect unknown.  Adderall, Strattera and Concerta caused emotional changesâ€“moodiness.  Sertraline (Zoloft)â€“appeared to be  ineffective.  With the start of BuSpar, staff note improvement in fighting with others, anger, verbal threats towards others and self. Continues to have irritability towards same peer. Staff report skin excoriation improved. No open areas: fingers, back of head, nose and in ear.   Abilify- minimal improvement in irritably  ziprasidone (Geodon)- had not yet been maximized to its full potential. Declined increase. Med education provided that this was not a medication failure. May need to consider using again in the future.  Psy/SI/HI/aggression: Denies A/VH, paranoia. Denies SI/HI  Continuous talking appears to be ongoing since prior to age 9.  SIB: easily frustrated. skin excoriation on fingers.      Appetite: good. 2016. Dx dysphagia velopharyngeal insufficiency. Seen & evaluation by otolaryngic Dr. New Mahmood. No concerns noted & recommended speech therapy. Chopped tough meats.   Daily schedule: Learning [employer] since September 2019. He comes home happy.      History of 2-infantile surgeries:Hypospadius & inguinal hernia and hydrocele for repair.  Med Physicians:  PCP:Dr. Smith: routine & PRN visits.   Endocrinology: Dr. Castano from [facility].   Genetics: Dr. Marlen Rogers. Denied DX of Marfan Syndrome & Ruiz- Fryns Syndrome  Cardiologist: Dr. Sonny Thomas. Started carvedilol for Left ventricular dysfunction.  Evaluated y Dr. Amara Bean, on 5/13/21  Dentist: Routine.   Hx of excessive saliva. Most likely dt keeping his head down, chin on chest. With the DC of Risperdal, Cheko reports that he no longer bites the inside of his cheek. He also seems to be able to enunciate better.   Eye: Wears bifocals   Neurologist: last seen in 2016. Appears primary care doctor prescribes Lamictal for epilepsy. Unknown last seizure. Per old chart, seizures started at approximately 8 years of age. Has upward eye rolling with facial twitching and fist clenching. EEG at that time, mother reported was normal.  Neurologist diagnosed  "ADHD; \"Fidgety boy who bounces,\" history of impulsiveness with darting, issues keeping his hands to himself, growling behaviors, a worrier who is bothered by noises from the smoke detector or a fire alarm. He insists on things being arranged in a certain way, somewhat fearful above low tolerance and is easily stressed.     ALLERGIES: NKDA     MEDICAL REVIEW OF SYSTEMS:  GEN: denies fever, chills, night sweats  HEENT: denies changes in vision, eye pain, hearing changes, no symptoms of upper respiratory infection  CARDIO: denies chest pain and palpitations   RESP: denies shortness of breath  GI: denies nausea/vomiting/constipation/diarrhea, or blood in the stool  : denies burning/frequency/urgency, or bloody urine  NEURO: denies tremor, dizziness, numbness or tingling. Hx infantile seizures  MSK: denies muscle spasms or stiffness. See 'Communication\" below. Sits with torso leaning to left, difficult for him to sit straight & when head is looking at this writer, left ear tilted towards his left shoulder. Asked ct to stand: Left shoulder approx 3 inches higher than right. Lumbar Lordosis noted.  Suspect Structural scoliosis  DERM: denies new onset of rash     Med SE: none noted or reported     COMPLIANCE: good     MMS:  ORIENTATION   alert  ambulatory  cooperative   dysmorphic features     BEHAVIOR:  enters easily  eye contact normal  gait normal  reciprocal interaction  spontaneous speech     MEMORY:  poor remote  poor recent     SENSORY :  Bifocal glasses     COMMUNICATION:  conversational  speech dysarthria- very difficult to understand. Per chart: nasal talk. Improved with Risperdal DC.   Facial asymmetrical. Entire left side of face lower than right. Question if part of speech issue.   Staff state stroke has been ruled out.     AFFECT:  normal/full     MOOD: anxious      THOUGHT PROCESS:  concrete   perseverative      THOUGHT Content:  obsessive     CONCENTRATION  Easily distracted     FUND OF KNOWLEDGE :  mod " disability- fx at approx age 6 yo     JUDGEMENT: posed situations     EPS: none noted or reported.   AIMS negative     LABS REVIEWED:  Dec 2023   Valproic Acid Lvl 36.7 ug/mL  50.0 - 100.0   Lamotrigine Lvl 9.6 ug/mL  3.0 - 15.0 ug/mL   May 2023 in chart  November 2022 prolactin 56 NG/mL (4â€“15)  Oct 2020:  TSH, Thyroid, T4, prolactin & Cortisol- WNL  October 2019:  CBC/Diff: WBC 3.7; RBC 4.35  TSH, Lipid, & Vitamin D (58)- WNL  CMP: Alk phos 106; creatinine 0.57, Globulin 2.2      Feb 2019: reviewed. WBC, RBC, H/H were low at that time.  2016 old labs reviewed     EKG = sees cardiologist  July 2022 in chart  May 2021  73 bpm  QTc 412  May 2019:   85 bpm  QTc 435

## 2024-03-08 ENCOUNTER — LAB (OUTPATIENT)
Dept: LAB | Facility: LAB | Age: 29
End: 2024-03-08
Payer: MEDICAID

## 2024-03-08 ENCOUNTER — OFFICE VISIT (OUTPATIENT)
Dept: CARDIOLOGY | Facility: CLINIC | Age: 29
End: 2024-03-08
Payer: MEDICAID

## 2024-03-08 VITALS
OXYGEN SATURATION: 96 % | SYSTOLIC BLOOD PRESSURE: 102 MMHG | WEIGHT: 148.2 LBS | TEMPERATURE: 97.7 F | RESPIRATION RATE: 16 BRPM | HEART RATE: 106 BPM | BODY MASS INDEX: 23.21 KG/M2 | DIASTOLIC BLOOD PRESSURE: 65 MMHG

## 2024-03-08 DIAGNOSIS — I42.9 CARDIOMYOPATHY, UNSPECIFIED TYPE (MULTI): ICD-10-CM

## 2024-03-08 DIAGNOSIS — I42.9 CARDIOMYOPATHY, UNSPECIFIED TYPE (MULTI): Primary | ICD-10-CM

## 2024-03-08 LAB — BNP SERPL-MCNC: 9 PG/ML (ref 0–99)

## 2024-03-08 PROCEDURE — 1036F TOBACCO NON-USER: CPT | Performed by: INTERNAL MEDICINE

## 2024-03-08 PROCEDURE — 99214 OFFICE O/P EST MOD 30 MIN: CPT | Performed by: INTERNAL MEDICINE

## 2024-03-08 PROCEDURE — 83880 ASSAY OF NATRIURETIC PEPTIDE: CPT

## 2024-03-08 PROCEDURE — 36415 COLL VENOUS BLD VENIPUNCTURE: CPT

## 2024-03-08 RX ORDER — BENZONATATE 100 MG/1
100 CAPSULE ORAL 3 TIMES DAILY PRN
COMMUNITY

## 2024-03-08 RX ORDER — CYCLOBENZAPRINE HCL 10 MG
10 TABLET ORAL 3 TIMES DAILY PRN
COMMUNITY

## 2024-03-08 RX ORDER — CLOBETASOL PROPIONATE 0.5 MG/G
CREAM TOPICAL 2 TIMES DAILY PRN
COMMUNITY

## 2024-03-08 RX ORDER — VENLAFAXINE 75 MG/1
75 TABLET ORAL DAILY
COMMUNITY
End: 2024-04-30 | Stop reason: ALTCHOICE

## 2024-03-08 NOTE — PROGRESS NOTES
Advanced Heart Failure and Cardiac Transplantation Cardiology    Kehinde Gonzalez is a 28 y.o. male from [facility], he lives in a group home/facility. He is here today with a nurse Simran, and a caregiver Sarah. They report to me that to their knowledge he has done well. No edema. He has not been complaining of shortness of breath.    Exam: /65   Pulse 106   Temp 36.5 °C (97.7 °F)   Resp 16   Wt 67.2 kg (148 lb 3.2 oz)   SpO2 96%   BMI 23.21 kg/m²   No JVD  RRR  CTA  No LE edema    Current Outpatient Medications   Medication Instructions    acetaminophen (TYLENOL) 650 mg, oral, Every 6 hours PRN    bacitracin zinc-polymyxin B 500-10,000 unit/gram bandage 1 each, Topical, 2 times daily    benzonatate (TESSALON) 100 mg, oral, 3 times daily PRN, Do not crush or chew.    bisacodyl (Dulcolax) 10 mg suppository rectal    brompheniramine-pseudoephedrin (Dimetapp) 1-15 mg/5 mL liquid 20 mL, oral, Every 4 hours PRN    busPIRone (Buspar) 5 mg tablet BuSpar 5 mg by mouth q AM and 5 mg by mouth at 3 PM    carvedilol (Coreg) 3.125 mg tablet 1 tablet, oral, 2 times daily    cetirizine (ZyrTEC) 10 mg tablet 1 tablet, oral, Daily    cholecalciferol (VITAMIN D-3) 1,000 Units, oral, Daily    clobetasol (Temovate) 0.05 % cream Topical, 2 times daily    cyclobenzaprine (FLEXERIL) 10 mg, oral, 3 times daily PRN    dapagliflozin (Farxiga) 10 mg 1 tablet, oral, Daily    desvenlafaxine (PRISTIQ) 50 mg, oral, Daily, Do not crush, chew, or split.    dextran 70-hypromellose drops ophthalmic (eye)    dextromethorphan-guaifenesin (Robitussin DM)  mg/5 mL syrup 5 mL, oral    diphenhydramine HCl (BENADRYL ORAL) 25 mg, oral, Every 6 hours PRN    divalproex (DEPAKOTE ER) 500 mg, oral, Nightly, Do not crush, chew, or split.    docusate sodium (Colace) 100 mg capsule oral    fluticasone (Flonase) 50 mcg/actuation nasal spray 1 spray    guaiFENesin (Robitussin) 100 mg/5 mL syrup oral, Every 4 hours RT    High Potency Multivitamin  "400 mcg tablet     hypromellose (GENTEAL TEARS SEVERE GEL OPHT) ophthalmic (eye)    ibuprofen 600 mg tablet 1 tablet, oral, Every 8 hours PRN    Lactobacillus acidophilus (PROBIOTIC ACIDOPHILUS ORAL) oral    lamoTRIgine (LAMICTAL) 150 mg, oral, 2 times daily    lidocaine-aloe vera (Solarcaine Cool Aloe) 0.5 % spray Topical    loperamide (Imodium) 2 mg capsule Initial: 4 mg, followed by 2 mg PRN after each loose stool for 3 days. Max: 8 mg in 1 day.    magnesium hydroxide (Milk of Magnesia) 400 mg/5 mL suspension oral, Daily PRN    naproxen (NAPROSYN) 500 mg, oral    risperiDONE (RISPERDAL) 3 mg, oral, 2 times daily    sacubitriL-valsartan (Entresto) 24-26 mg tablet 1 tablet, oral, 2 times daily    sertraline (Zoloft) 100 mg tablet Take 100 mg by mouth daily x 1 week, then decrease to 50 mg by mouth daily x 1 week, then DC.    sodium phosphates 19-7 gram/197 mL enema 1 enema, rectal    spironolactone (ALDACTONE) 12.5 mg, oral, Daily    Triple Antibiotic ointment     venlafaxine (EFFEXOR) 75 mg, oral, Daily     Past medical history (non-cardiac):  -- History of cognitive/developmental delay  -- History of seizures  -- History of self-injurious behavior     Cardiovascular history:  --Presumed nonischemic cardiomyopathy of unknown cause  --Stage B HFrEF (\"Pre-heart failure\") with no prior signs or symptoms of disease    Assessment: Does not seem to have progressed, probably still Stage B as noted above.    Plan:  -- Check BNP  -- No medication changes at this time  -- Routine visit in 1 year    Amara Bean MD, MPH  Advanced Heart Failure and Transplant Cardiology  [facility]  [facility]      "

## 2024-03-08 NOTE — PATIENT INSTRUCTIONS
To reach Dr. Bean's office please call [phone] ([facility]). Fax [phone]. Call [phone] to schedule an appointment. You may also contact the HF RNs at [email or web address]    Thank you for coming to your appointment today. If you have any questions or need cardiac  medication refills, please call the Heart Failure Office at [phone] option [phone].

## 2024-03-20 ENCOUNTER — APPOINTMENT (OUTPATIENT)
Dept: BEHAVIORAL HEALTH | Facility: CLINIC | Age: 29
End: 2024-03-20
Payer: MEDICAID

## 2024-03-21 ENCOUNTER — PATIENT MESSAGE (OUTPATIENT)
Dept: PRIMARY CARE | Facility: CLINIC | Age: 29
End: 2024-03-21
Payer: MEDICAID

## 2024-03-21 DIAGNOSIS — E23.7: ICD-10-CM

## 2024-03-21 DIAGNOSIS — E55.9 VITAMIN D DEFICIENCY: Primary | ICD-10-CM

## 2024-03-21 DIAGNOSIS — E55.9 VITAMIN D DEFICIENCY: ICD-10-CM

## 2024-03-21 RX ORDER — MULTIVITAMIN WITH FOLIC ACID 400 MCG
TABLET ORAL
Qty: 90 TABLET | Refills: 3 | Status: SHIPPED | OUTPATIENT
Start: 2024-03-21 | End: 2024-03-26 | Stop reason: SDUPTHER

## 2024-03-25 ENCOUNTER — APPOINTMENT (OUTPATIENT)
Dept: BEHAVIORAL HEALTH | Facility: CLINIC | Age: 29
End: 2024-03-25
Payer: MEDICAID

## 2024-03-25 PROBLEM — S93.401A SPRAIN OF RIGHT ANKLE: Status: ACTIVE | Noted: 2023-09-11

## 2024-03-25 PROBLEM — L98.1 DERMATITIS FACTITIA: Status: ACTIVE | Noted: 2023-04-06

## 2024-03-26 DIAGNOSIS — E55.9 VITAMIN D DEFICIENCY: ICD-10-CM

## 2024-03-26 DIAGNOSIS — E23.7: ICD-10-CM

## 2024-03-26 RX ORDER — MULTIVITAMIN WITH FOLIC ACID 400 MCG
TABLET ORAL
Qty: 90 TABLET | Refills: 3 | Status: SHIPPED | OUTPATIENT
Start: 2024-03-26 | End: 2024-03-26 | Stop reason: SDUPTHER

## 2024-03-26 RX ORDER — MULTIVITAMIN WITH FOLIC ACID 400 MCG
TABLET ORAL
Qty: 90 TABLET | Refills: 3 | Status: SHIPPED | OUTPATIENT
Start: 2024-03-26

## 2024-03-28 ENCOUNTER — TELEPHONE (OUTPATIENT)
Dept: PRIMARY CARE | Facility: CLINIC | Age: 29
End: 2024-03-28
Payer: MEDICAID

## 2024-03-28 PROBLEM — D64.9 ANEMIA: Status: ACTIVE | Noted: 2024-03-28

## 2024-03-28 PROBLEM — S93.401A SPRAIN OF RIGHT ANKLE: Status: RESOLVED | Noted: 2024-03-28 | Resolved: 2024-03-28

## 2024-04-11 ENCOUNTER — OFFICE VISIT (OUTPATIENT)
Dept: PRIMARY CARE | Facility: CLINIC | Age: 29
End: 2024-04-11
Payer: MEDICAID

## 2024-04-11 VITALS
RESPIRATION RATE: 18 BRPM | WEIGHT: 150 LBS | DIASTOLIC BLOOD PRESSURE: 74 MMHG | OXYGEN SATURATION: 97 % | SYSTOLIC BLOOD PRESSURE: 111 MMHG | HEIGHT: 67 IN | TEMPERATURE: 97.9 F | BODY MASS INDEX: 23.54 KG/M2 | HEART RATE: 99 BPM

## 2024-04-11 DIAGNOSIS — E55.9 VITAMIN D DEFICIENCY: ICD-10-CM

## 2024-04-11 DIAGNOSIS — D35.2 PROLACTINOMA (MULTI): ICD-10-CM

## 2024-04-11 DIAGNOSIS — Z00.00 ROUTINE GENERAL MEDICAL EXAMINATION AT A HEALTH CARE FACILITY: Primary | ICD-10-CM

## 2024-04-11 DIAGNOSIS — Q04.0 AGENESIS OF CORPUS CALLOSUM (MULTI): ICD-10-CM

## 2024-04-11 DIAGNOSIS — G40.409 OTHER GENERALIZED EPILEPSY AND EPILEPTIC SYNDROMES, NOT INTRACTABLE, WITHOUT STATUS EPILEPTICUS (MULTI): ICD-10-CM

## 2024-04-11 DIAGNOSIS — Z11.59 ENCOUNTER FOR HEPATITIS C SCREENING TEST FOR LOW RISK PATIENT: ICD-10-CM

## 2024-04-11 DIAGNOSIS — I50.22 CHRONIC SYSTOLIC (CONGESTIVE) HEART FAILURE (MULTI): ICD-10-CM

## 2024-04-11 DIAGNOSIS — Z11.4 SCREENING FOR HUMAN IMMUNODEFICIENCY VIRUS WITHOUT PRESENCE OF RISK FACTORS: ICD-10-CM

## 2024-04-11 PROCEDURE — 1036F TOBACCO NON-USER: CPT | Performed by: FAMILY MEDICINE

## 2024-04-11 PROCEDURE — 99395 PREV VISIT EST AGE 18-39: CPT | Performed by: FAMILY MEDICINE

## 2024-04-11 ASSESSMENT — ENCOUNTER SYMPTOMS
ARTHRALGIAS: 0
WEAKNESS: 0
WHEEZING: 0
HEMATOCHEZIA: 0
SORE THROAT: 0
NUMBNESS: 0
DYSURIA: 0
PHOTOPHOBIA: 0
HEADACHES: 0
SPEECH DIFFICULTY: 1
SEIZURES: 0
NECK STIFFNESS: 0
NECK PAIN: 0
NAUSEA: 0
ABDOMINAL DISTENTION: 0
FACIAL ASYMMETRY: 0
ANOREXIA: 0
LEG SWELLING: 0
POLYDIPSIA: 0
BRUISES/BLEEDS EASILY: 0
CHEST TIGHTNESS: 0
CHILLS: 0
BLOOD IN STOOL: 0
WOUND: 0
EYE DISCHARGE: 0
FATIGUE: 0
MYALGIAS: 0
TROUBLE SWALLOWING: 0
APPETITE CHANGE: 0
HEMATEMESIS: 0
HEMATURIA: 0
FLANK PAIN: 0
BACK PAIN: 0
CHOKING: 0
ADENOPATHY: 0
JOINT SWELLING: 0
DIAPHORESIS: 0
VOMITING: 0
EYE ITCHING: 0
PARESTHESIAS: 0
CONFUSION: 0
SINUS PRESSURE: 0
EYE PAIN: 0
ACTIVITY CHANGE: 0
DYSPHORIC MOOD: 0
TREMORS: 0
LIGHT-HEADEDNESS: 0
UNEXPECTED WEIGHT CHANGE: 0
DIZZINESS: 0
HALLUCINATIONS: 0
DIARRHEA: 0
RHINORRHEA: 0
FEVER: 0
COUGH: 0
PALPITATIONS: 0
WEIGHT LOSS: 0
SHORTNESS OF BREATH: 0
SLEEP DISTURBANCE: 0
EYE REDNESS: 0
VOICE CHANGE: 0
ABDOMINAL PAIN: 0
CONSTIPATION: 0
FREQUENCY: 0

## 2024-04-11 NOTE — PROGRESS NOTES
"Subjective   Patient ID: Kehinde Gonzalez is a 29 y.o. male who presents for Annual Exam (And review labs ).    Patient has a PMH of epilepsy, corpus callosal agenesis, and optic nerve hypoplasia.    Patient presents today for his annual exam. Recent labs are significant for low white count, hemoglobin at the lower limit of normal, low hematocrit, low creatinine, lipid and thyroid panel within normal limits. Patient denies dark stools, tachycardia, increasing pallor, shortness of breath, fatigue, palpitations, chest pain. Patient notes he is \"not a morning person.\" Overall, he feels about the same. His last visit to see the optometrist was last year.      Subjective  Kehinde Gonzalez is a 29 y.o. male and is here for a comprehensive physical exam. The patient reports see below.    Do you take any herbs or supplements that were not prescribed by a doctor? no  Are you taking calcium supplements? no  Are you taking aspirin daily? no      History:  Any STD's in the past? none      Anemia  Presents for follow-up visit. There has been no abdominal pain, anorexia, bruising/bleeding easily, confusion, fever, leg swelling, light-headedness, malaise/fatigue, pallor, palpitations, paresthesias, pica or weight loss. Signs of blood loss that are not present include hematemesis, hematochezia and melena. There are no compliance problems.         Review of Systems   Constitutional:  Negative for activity change, appetite change, chills, diaphoresis, fatigue, fever, malaise/fatigue, unexpected weight change and weight loss.   HENT:  Negative for congestion, ear pain, hearing loss, nosebleeds, postnasal drip, rhinorrhea, sinus pressure, sneezing, sore throat, tinnitus, trouble swallowing and voice change.    Eyes:  Negative for photophobia, pain, discharge, redness, itching and visual disturbance.   Respiratory:  Negative for cough, choking, chest tightness, shortness of breath and wheezing.    Cardiovascular:  Negative for chest pain, " "palpitations and leg swelling.   Gastrointestinal:  Negative for abdominal distention, abdominal pain, anorexia, blood in stool, constipation, diarrhea, hematemesis, hematochezia, melena, nausea and vomiting.   Endocrine: Negative for cold intolerance, heat intolerance, polydipsia and polyuria.   Genitourinary:  Negative for dysuria, flank pain, frequency, hematuria and urgency.   Musculoskeletal:  Positive for gait problem. Negative for arthralgias, back pain, joint swelling, myalgias, neck pain and neck stiffness.   Skin:  Negative for pallor, rash and wound.   Allergic/Immunologic: Negative for immunocompromised state.   Neurological:  Positive for speech difficulty. Negative for dizziness, tremors, seizures, syncope, facial asymmetry, weakness, light-headedness, numbness, headaches and paresthesias.        Developmentally disabled   Hematological:  Negative for adenopathy. Does not bruise/bleed easily.   Psychiatric/Behavioral:  Positive for behavioral problems. Negative for confusion, dysphoric mood, hallucinations, sleep disturbance and suicidal ideas.      Objective   /74 (BP Location: Left arm, Patient Position: Sitting, BP Cuff Size: Adult long)   Pulse 99   Temp 36.6 °C (97.9 °F) (Temporal)   Resp 18   Ht 1.702 m (5' 7\")   Wt 68 kg (150 lb)   SpO2 97%   BMI 23.49 kg/m²     Physical Exam  Constitutional:       General: He is not in acute distress.     Appearance: He is not ill-appearing or diaphoretic.      Comments: Marfanoid habitus   HENT:      Head: Normocephalic and atraumatic.      Right Ear: External ear normal.      Left Ear: External ear normal.      Nose: Nose normal. No rhinorrhea.      Mouth/Throat:      Mouth: Mucous membranes are moist.   Eyes:      General: Lids are normal. No scleral icterus.        Right eye: No discharge.         Left eye: No discharge.      Conjunctiva/sclera: Conjunctivae normal.   Cardiovascular:      Rate and Rhythm: Normal rate and regular rhythm.      " Pulses: Normal pulses.      Heart sounds: No murmur heard.  Pulmonary:      Effort: Pulmonary effort is normal. No respiratory distress.      Breath sounds: No decreased breath sounds, wheezing, rhonchi or rales.   Abdominal:      General: Bowel sounds are normal. There is no distension.      Palpations: Abdomen is soft. There is no mass.      Tenderness: There is no abdominal tenderness. There is no guarding or rebound.   Musculoskeletal:         General: No swelling or tenderness.      Cervical back: No rigidity or tenderness.      Right lower leg: No edema.      Left lower leg: No edema.   Lymphadenopathy:      Cervical: No cervical adenopathy.      Upper Body:      Right upper body: No supraclavicular adenopathy.      Left upper body: No supraclavicular adenopathy.   Skin:     General: Skin is warm and dry.      Coloration: Skin is not jaundiced or pale.      Findings: No erythema, lesion or rash.   Neurological:      General: No focal deficit present.      Mental Status: He is alert and oriented to person, place, and time.      Sensory: No sensory deficit.      Motor: No weakness or tremor.      Coordination: Coordination abnormal.      Gait: Gait abnormal.   Psychiatric:         Mood and Affect: Mood normal. Affect is not inappropriate.      Comments: Garbled speech unchanged  Exhibits behavioral abnormalities on occasion       Assessment/Plan   Diagnoses and all orders for this visit:  Routine general medical examination at a health care facility  -     Follow Up In [facility] - PCP  -     Follow Up In [facility] - PCP - Established; Future  -     Follow Up In [facility] - PCP - Health Maintenance; Future  Vitamin D deficiency  -     Follow Up In [facility] - PCP  -     Follow Up In [facility] - PCP - Established; Future  -     Comprehensive Metabolic Panel; Future  -     Magnesium; Future  -     Vitamin D 25-Hydroxy,Total (for eval of Vitamin D levels);  Future  -     [facility]; Future  Chronic systolic (congestive) heart failure (CMS/HCC)  -     Follow Up In [facility] - PCP  -     Follow Up In [facility]; Future  -     CBC and Auto Differential; Future  -     Comprehensive Metabolic Panel; Future  -     Lipid Panel; Future  -     Magnesium; Future  -     TSH with reflex to Free T4 if abnormal; Future  -     [facility]; Future  Other generalized epilepsy and epileptic syndromes, not intractable, without status epilepticus (CMS/HCC)  -     Follow Up In [facility] - PCP  -     Follow Up In [facility]; Future  -     CBC and Auto Differential; Future  -     Comprehensive Metabolic Panel; Future  -     [facility]; Future  Agenesis of corpus callosum (CMS/HCC)  -     Follow Up In [facility] - PCP  -     Follow Up In [facility]; Future  -     Follow Up [facility]; Future  Encounter for hepatitis C screening test for low risk patient  -     Hepatitis C antibody; Future  -     Follow Up In [facility]; Future  Screening for human immunodeficiency virus without presence of risk factors  -     HIV 1/2 Antigen/Antibody Screen with Reflex to Confirmation; Future  -     Follow Up In [facility]; Future  Prolactinoma (CMS/HCC)  -     Follow Up In [facility]; Future  -     Prolactin level; Future  -     Follow Up In [facility]; Future    Reviewed recent labs. Patient continues to have low white count, hemoglobin at the lower limit of normal, and low hematocrit. His current levels do not appear to be significantly outside his  baseline. No need for hematology referral at this time unless he develops symptomatology or worsening hemoglobin/hematocrit below his baseline. His vitamin D level is within normal limits, will plan to continue at current dose of multivitamin and vitamin D. Caregiver voiced understanding and agreement with the treatment plan.    Patient remains a fall risk. Risk prevention instructions discussed. Caregiver voiced understanding and agreement.    Plan for follow up in 1 year with labs.  Patient was seen and evaluated with student. I was present for critical portion of history and physical exam. I reviewed note with student and agree with findings as written  TJW DO    2. Patient Counseling:  --Nutrition: Stressed importance of moderation in sodium/caffeine intake, saturated fat and cholesterol, caloric balance, sufficient intake of fresh fruits, vegetables, fiber, calcium, iron.  --Exercise: Stressed the importance of regular exercise.   --Substance Abuse: Discussed cessation/primary prevention of tobacco, alcohol, or other drug use; driving or other dangerous activities under the influence; availability of treatment for abuse.   --Injury prevention: Discussed safety belts, safety helmets, smoke detector, smoking near bedding or upholstery.   --Dental health: Discussed importance of regular tooth brushing, flossing, and dental visits.  --Immunizations reviewed.  --Discussed benefits of screening colonoscopy.  Due age 45  3. Discussed the patient's BMI with him.  The BMI is in the acceptable range.  4. Follow up in one year

## 2024-04-11 NOTE — PROGRESS NOTES
"Subjective   Patient ID: Kehinde Gonzalez is a 29 y.o. male who presents for Annual Exam (And review labs ).    HPI     Review of Systems    Objective   /74 (BP Location: Left arm, Patient Position: Sitting, BP Cuff Size: Adult long)   Pulse 99   Temp 36.6 °C (97.9 °F) (Temporal)   Resp 18   Ht 1.702 m (5' 7\")   Wt 68 kg (150 lb)   SpO2 97%   BMI 23.49 kg/m²     Physical Exam    Assessment/Plan          "

## 2024-04-17 ENCOUNTER — APPOINTMENT (OUTPATIENT)
Dept: PRIMARY CARE | Facility: CLINIC | Age: 29
End: 2024-04-17
Payer: MEDICAID

## 2024-04-30 ENCOUNTER — OFFICE VISIT (OUTPATIENT)
Dept: BEHAVIORAL HEALTH | Facility: CLINIC | Age: 29
End: 2024-04-30
Payer: MEDICAID

## 2024-04-30 DIAGNOSIS — F41.9 ANXIETY: ICD-10-CM

## 2024-04-30 DIAGNOSIS — Z72.89 SELF-INJURIOUS BEHAVIOR: ICD-10-CM

## 2024-04-30 DIAGNOSIS — Z79.899 ENCOUNTER FOR LONG-TERM (CURRENT) USE OF HIGH-RISK MEDICATION: ICD-10-CM

## 2024-04-30 DIAGNOSIS — F91.9 DISRUPTIVE BEHAVIOR DISORDER: Primary | ICD-10-CM

## 2024-04-30 PROCEDURE — 99214 OFFICE O/P EST MOD 30 MIN: CPT | Performed by: NURSE PRACTITIONER

## 2024-04-30 RX ORDER — RISPERIDONE 3 MG/1
3 TABLET ORAL 2 TIMES DAILY
Qty: 60 TABLET | Refills: 3 | Status: SHIPPED | OUTPATIENT
Start: 2024-04-30 | End: 2024-08-28

## 2024-04-30 RX ORDER — BUSPIRONE HYDROCHLORIDE 5 MG/1
TABLET ORAL
Qty: 60 TABLET | Refills: 3 | Status: SHIPPED | OUTPATIENT
Start: 2024-04-30

## 2024-04-30 RX ORDER — DIVALPROEX SODIUM 500 MG/1
500 TABLET, FILM COATED, EXTENDED RELEASE ORAL NIGHTLY
Qty: 30 TABLET | Refills: 3 | Status: SHIPPED | OUTPATIENT
Start: 2024-04-30 | End: 2024-08-28

## 2024-04-30 RX ORDER — DESVENLAFAXINE 50 MG/1
50 TABLET, EXTENDED RELEASE ORAL DAILY
Qty: 30 TABLET | Refills: 3 | Status: SHIPPED | OUTPATIENT
Start: 2024-04-30 | End: 2024-08-28

## 2024-04-30 ASSESSMENT — PATIENT HEALTH QUESTIONNAIRE - PHQ9
8. MOVING OR SPEAKING SO SLOWLY THAT OTHER PEOPLE COULD HAVE NOTICED. OR THE OPPOSITE, BEING SO FIGETY OR RESTLESS THAT YOU HAVE BEEN MOVING AROUND A LOT MORE THAN USUAL: NOT AT ALL
5. POOR APPETITE OR OVEREATING: NOT AT ALL
6. FEELING BAD ABOUT YOURSELF - OR THAT YOU ARE A FAILURE OR HAVE LET YOURSELF OR YOUR FAMILY DOWN: SEVERAL DAYS
9. THOUGHTS THAT YOU WOULD BE BETTER OFF DEAD, OR OF HURTING YOURSELF: MORE THAN HALF THE DAYS
7. TROUBLE CONCENTRATING ON THINGS, SUCH AS READING THE NEWSPAPER OR WATCHING TELEVISION: NOT AT ALL
3. TROUBLE FALLING OR STAYING ASLEEP OR SLEEPING TOO MUCH: SEVERAL DAYS
1. LITTLE INTEREST OR PLEASURE IN DOING THINGS: SEVERAL DAYS
2. FEELING DOWN, DEPRESSED OR HOPELESS: NOT AT ALL
4. FEELING TIRED OR HAVING LITTLE ENERGY: NOT AT ALL

## 2024-04-30 NOTE — PATIENT INSTRUCTIONS
ASSESSMENT: Mr. Gonzalez presents at baseline mental health wise.  No changes noted with DC of sertraline.  Referral for therapist Carmelita Camacho re-submitted.  See treatment plan below.     Pharmacogenomics Testing (PGT) June 2021 results reviewed:  Normal Folic Acid Conversion  Use As Directed: Desvenlafaxine (Pristiq), Buspirone (BuSpar), & sertraline (Zoloft) and Divalproex Sodium (Depakote).   Moderate gene to drug interaction: Risperdal with the clinical consideration that serum level may be too low and higher doses may be required.     PLAN:                                                                                                                      problems treated   f/u requested to prevent relapse   medications renewed/re-ordered     1. Continue desvenlafaxine  50 mg by mouth every morning for anxiety.  2. Continue BuSpar 5 mg by mouth q AM and 5 mg by mouth at 3 PM for ROBIN.  3. Continue risperidone 3 mg by mouth twice daily for impulse control disorder and behaviors. He was previously on 5 mg daily and was not effective. 7/14/22 Discussed lab results. Medication counseling and reviewed prolactinemia hx, staff, Guardian and ct were aware and accepted responsibility. HX prolactinemia November 2022: 56ng/mL (4-15). Reports asymptomatic.  4. Continue Divalproex Sodium (Depakote) 500 mg by mouth at HS for moods.   5. Risks, benefits, alternatives, off-label uses, and side effects of medications have been discussed with patient/caregiver. There is no report of signs/symptoms consistent with medication-induced impairment in daily functioning. At this time, benefits of medication felt to outweigh potential risks. Will continue to reassess need for psychotropic medication at regular 3-month intervals.  6. Return to clinic 3 months or earlier if needed. Call [phone] to reschedule.   7.  Prescription for risperidone serum level and valproic acid serum level given to be added to routine labs.  No  risperidone and no Depakote for at least 10 hours prior to lab draw.     Thank you for seeing me today. If you have any questions or concerns, do not hesitate to contact my office.  Tamanna Romano

## 2024-04-30 NOTE — PROGRESS NOTES
ASSESSMENT: Mr. Gonzalez presents at baseline mental health wise.  No changes noted with DC of sertraline.  Referral for therapist Carmelita Camacho re-submitted.  See treatment plan below.     Pharmacogenomics Testing (PGT) June 2021 results reviewed:  Normal Folic Acid Conversion  Use As Directed: Desvenlafaxine (Pristiq), Buspirone (BuSpar), & sertraline (Zoloft) and Divalproex Sodium (Depakote).   Moderate gene to drug interaction: Risperdal with the clinical consideration that serum level may be too low and higher doses may be required.     PLAN:                                                                                                                      problems treated   f/u requested to prevent relapse   medications renewed/re-ordered     1. Continue desvenlafaxine  50 mg by mouth every morning for anxiety.  2. Continue BuSpar 5 mg by mouth q AM and 5 mg by mouth at 3 PM for ROBIN.  3. Continue risperidone 3 mg by mouth twice daily for impulse control disorder and behaviors. He was previously on 5 mg daily and was not effective. 7/14/22 Discussed lab results. Medication counseling and reviewed prolactinemia hx, staff, Guardian and ct were aware and accepted responsibility. HX prolactinemia November 2022: 56ng/mL (4-15). Reports asymptomatic.  4. Continue Divalproex Sodium (Depakote) 500 mg by mouth at HS for moods.   5. Risks, benefits, alternatives, off-label uses, and side effects of medications have been discussed with patient/caregiver. There is no report of signs/symptoms consistent with medication-induced impairment in daily functioning. At this time, benefits of medication felt to outweigh potential risks. Will continue to reassess need for psychotropic medication at regular 3-month intervals.  6. Return to clinic 3 months or earlier if needed. Call [phone] to reschedule.   7.  Prescription for risperidone serum level and valproic acid serum level given to be added to routine labs.  No  "risperidone and no Depakote for at least 10 hours prior to lab draw.        Thank you for seeing me today. If you have any questions or concerns, do not hesitate to contact my office.  Tamanna Romano    TREATMENT TYPE         counseling and coordination of care; addressing signs and symptoms of illness; risks/benefits and side effects of medications; and behavioral approaches to illness.  This note was created using electronic dictation. There may be errors in syntax and meaning. Please contact the office with any questions.  For [facility] residents, [employer] is a 24/7 hotline you can call for assistance [[phone]].   Please call 911 or go to your closest Emergency Room if you feel worse. This includes thoughts of hurting yourself or anyone else, or having other troubles such as hearing voices, seeing visions, or having new and scary thoughts about the people around you.       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      Last seen February 2024.  At that time, sertraline was discontinued due to ineffective and low serum levels at maximum dose.  November 2023. At that time, Depakote was started.   October 2023.  At that time, no medication changes due to pending lab results.  August 2023. At that time, no medication changes.  July 2023. At that time, venlafaxine was decreased.  June 26, 2023. At that time, venlafaxine was increased and timing on BuSpar was adjusted (from 12 hours to 8 hours).  June 1, 2023. At that time, no medication changes. On venlafaxine HCL ER for 11 days and was his last day on Clonazepam.   May 2023. At that time, venlafaxine HCL ER was started.   March 2023. At that time, clonazepam was decreased " due to possible disinhibition.  December 2022. At that time, no medication changes.  November 2022. At that time, BuSpar was started.  October 2022. At that time, no medication changes.  July 2022. At that time, Risperdal was restarted per request for skin excoriation, Ziprasidone and Colace were discontinued.  May 2022. At that time, sertraline (Zoloft) was increased.  April 2022: sertraline was increased.   February 23, 2022. At that time, Sertraline was increased.  February 1, 2022. At that time, NAC was DC'd, Ziprasidone (Geodon) & Clonazepam were increased.  January 5, 2022. At that time, Geodon (Ziprasidone) was started   November 2021. At that time, Lurasidone (Latuda) was increased.  September 2021. At that time, Abilify was DC'd (not effective) and Latuda was started (chosen dt new cardiac concerns).  August 2021. At that time, sertraline (ZoloftÂ®) was increased.   June 2021. At that time, fluoxetine (Prozac) was DC'd & sertraline (ZoloftÂ®) was started.   May 2021. At that time, Abilify was increased.  April 2021. At that time, no medication changes.  February 2021. At that time, no medication changes.  January 28, 2021. At that time, no medication changes.  January 5, 2021. At that time, Klonopin was decreased, Abilify & Colace were started.  November 2020. At that time, NAC was increased.   October 2020. At that time, Klonopin was decreased and Fluoxetine was increased.   August 2020. At that time, no medication changes.  July 2020. At that time, NAC was increased.  May 2020. At that time, no medication changes.  January 2020. At that time, no medication changes.  November 2019. At that time, NAC was increased. Jan 2020, Risperdal was DC'd.   September. At that time, Risperdal was increased and NAC was started.   Aug 2019. At that time, fluoxetine was increased and Risperdal was decreased.  June 2019. At that time, fluoxetine was increased and Risperdal was decreased.  April 2019. At that time, no med  "changes.     Cheko reports that he is \"good\".  PHQ-9 score 5 on 4/30/24    Staff concerned with SIB with injury: scratches on face, hits his head, and will pull off his own finger/toe nails if bothering him.  All day \"Bullying\" of peer that is lower functioning (not able to defend self against Cheko).  has been working and staying busy.  Had a picnic in the pavilion with his family yesterday.  States he feels and gets along pretty good lately.  Staff report SIB 45 min of pounding on head in frustration after peer involvement, also hitting self more.   No de-escalation would work. Hitting dents into the wall.   Repeats kill myself verbals.     He visits his Mom and goes to [facility]. He enjoys going there, especially for the cats. One Calico cat, Maribell, sleeps on his chest.  5/20/23 started taking venlafaxine HCL ER. Significant change in behaviors: worse: hitting self and others more. Threatening to harm and kill himself more. Increased behaviors surrounding wanting to eat more. Describes as increased obsessions with food. Increased SIB: scratching (scratches on face) & hitting self (sores on back of head). Property destruction: broke his iPad. Closer to hitting others. Increased frustration level with longer, more difficult periods to calm down. Redirection no longer works. IE: shoe fell off; took 30+ minutes to calm.  \"Angry with self constantly\"  Considering changing Counselors due to pattern of talking. Sees therapist weekly.  Seriousness of targeting peers with more cognitive issues, communication concerns and those with WC or walkers. Has never gone away.      Cheko reports that his sleep \"on and off.\" With help from staff, sleeping through the night, with 1 awakening to use RR and back to sleep.  If not busy, he will lay in recliner and sleep.   He has been staying busy. Doing extra work.  [facility]- frustration with losing sight. Requiring holding onto staff's arm in unfamiliar places. Felt " more hope after apt. Adaptive piece to magnify iPad.   Deficits causing increase in depression. Resorts to saying he is going to kill himself at any frustration.     Ripped off right middle toenail.  Targets 4 peers in the home, Drastic shift in moods from irritable to laughing.     4/29/23 [facility] ER in [place] for bronchitis  PHQ 9 on 5/4/23 score 15 moderately severe  ROBIN 7 on 5/4/23 score 16 severe anxiety     his grandma passed away November 2022. It's been hard, but he looks at FB pictures and distracts by talking to staff and working.     tries to find ways to control having anxiety attacks:         7/4/22 SIB violent hitting head, threatening to kill self & others, 911 called and squadded to  ER.      Continues with cardiac services. On 3 heart medications  Appetite decreased when anxious     Infatuation with others dirty attends w/sexual arousal  Increased urinary incontinence; especially when frustrated & in the night     Resides: [place] since October 5, 2021. His home has 7 same-age male peers. Shares room w/1 male peer. Caregiver reports that he still remains angry 90% of the time. Irritability with threatening, punching walls, kicking items, and waving his fists/arm swings.     He utilizes headphones, which appear to be noise canceling. Tries to use his iPad, but having difficulty.  SIB. Pounds the sided of his temples, punch and kick the door, only when he does something he feels wrong. IE: drops something or gets something wrong.  If he runs into the wall, he will want to punch the wall.  Towards others: will raise his arm like he is going to punch them.     MEDICATIONS: Past: Ritalin- effect unknown.  Adderall, Strattera and Concerta caused emotional changesâ€“moodiness.  Sertraline (Zoloft)â€“appeared to be ineffective.  With the start of BuSpar, staff note improvement in fighting with others, anger, verbal threats towards others and self. Continues to have irritability towards  "same peer. Staff report skin excoriation improved. No open areas: fingers, back of head, nose and in ear.   Abilify- minimal improvement in irritably  ziprasidone (Geodon)- had not yet been maximized to its full potential. Declined increase. Med education provided that this was not a medication failure. May need to consider using again in the future.  Psy/SI/HI/aggression: Denies A/VH, paranoia. Denies SI/HI  Continuous talking appears to be ongoing since prior to age 9.  SIB: easily frustrated. skin excoriation on fingers.      Appetite: good. 2016. Dx dysphagia velopharyngeal insufficiency. Seen & evaluation by otolaryngic Dr. New Mahmood. No concerns noted & recommended speech therapy. Chopped tough meats.   Daily schedule: Learning [employer], since September 2019. He comes home happy.      History of 2-infantile surgeries:Hypospadius & inguinal hernia and hydrocele for repair.  Med Physicians:  PCP:Dr. Smith: routine & PRN visits.   Endocrinology: Dr. Castano from [facility].   Genetics: Dr. Marlen Rogers. Denied DX of Marfan Syndrome & Ruiz- Fryns Syndrome  Cardiologist: Dr. Sonny Thomas. Started carvedilol for Left ventricular dysfunction.  Evaluated y Dr. Amara Bean, on 5/13/21  Dentist: Routine.   Hx of excessive saliva. Most likely dt keeping his head down, chin on chest. With the DC of Risperdal, Cheko reports that he no longer bites the inside of his cheek. He also seems to be able to enunciate better.   Eye: Wears bifocals   Neurologist: last seen in 2016. Appears primary care doctor prescribes Lamictal for epilepsy. Unknown last seizure. Per old chart, seizures started at approximately 8 years of age. Has upward eye rolling with facial twitching and fist clenching. EEG at that time, mother reported was normal.  Neurologist diagnosed ADHD; \"Fidgety boy who bounces,\" history of impulsiveness with darting, issues keeping his hands to himself, growling behaviors, a worrier who is bothered by noises from the " "smoke detector or a fire alarm. He insists on things being arranged in a certain way, somewhat fearful above low tolerance and is easily stressed.     ALLERGIES: NKDA     MEDICAL REVIEW OF SYSTEMS:  GEN: denies fever, chills, night sweats  HEENT: denies changes in vision, eye pain, hearing changes, no symptoms of upper respiratory infection  CARDIO: denies chest pain and palpitations   RESP: denies shortness of breath  GI: denies nausea/vomiting/constipation/diarrhea, or blood in the stool  : denies burning/frequency/urgency, or bloody urine  NEURO: denies tremor, dizziness, numbness or tingling. Hx infantile seizures  MSK: denies muscle spasms or stiffness. See 'Communication\" below. Sits with torso leaning to left, difficult for him to sit straight & when head is looking at this writer, left ear tilted towards his left shoulder. Asked ct to stand: Left shoulder approx 3 inches higher than right. Lumbar Lordosis noted.  Suspect Structural scoliosis  DERM: denies new onset of rash     Med SE: none noted or reported     COMPLIANCE: good     MMS:  ORIENTATION   alert  ambulatory  cooperative   dysmorphic features     BEHAVIOR:  enters easily  eye contact normal  gait normal  reciprocal interaction  spontaneous speech     MEMORY:  poor remote  poor recent     SENSORY :  Bifocal glasses     COMMUNICATION:  conversational  speech dysarthria- very difficult to understand. Per chart: nasal talk. Improved with Risperdal DC.   Facial asymmetrical. Entire left side of face lower than right. Question if part of speech issue.   Staff state stroke has been ruled out.     AFFECT:  normal/full     MOOD: anxious      THOUGHT PROCESS:  concrete   perseverative      THOUGHT Content:  obsessive     CONCENTRATION  Easily distracted     FUND OF KNOWLEDGE :  mod disability- fx at approx age 6 yo     JUDGEMENT: posed situations     EPS: none noted or reported.   AIMS negative      LABS REVIEWED:  Dec 2023   Valproic Acid Lvl 36.7 " ug/mL  50.0 - 100.0   Lamotrigine Lvl 9.6 ug/mL  3.0 - 15.0 ug/mL   May 2023 in chart  November 2022 prolactin 56 NG/mL (4â€“15)  Oct 2020:  TSH, Thyroid, T4, prolactin & Cortisol- WNL  October 2019:  CBC/Diff: WBC 3.7; RBC 4.35  TSH, Lipid, & Vitamin D (58)- WNL  CMP: Alk phos 106; creatinine 0.57, Globulin 2.2      Feb 2019: reviewed. WBC, RBC, H/H were low at that time.  2016 old labs reviewed     EKG = sees cardiologist  July 2022 in chart  May 2021  73 bpm  QTc 412  May 2019:   85 bpm  QTc 435

## 2024-06-18 ENCOUNTER — PATIENT MESSAGE (OUTPATIENT)
Dept: NEUROLOGY | Age: 29
End: 2024-06-18

## 2024-07-30 ENCOUNTER — APPOINTMENT (OUTPATIENT)
Dept: BEHAVIORAL HEALTH | Facility: CLINIC | Age: 29
End: 2024-07-30
Payer: MEDICAID

## 2024-08-14 ENCOUNTER — APPOINTMENT (OUTPATIENT)
Dept: BEHAVIORAL HEALTH | Facility: CLINIC | Age: 29
End: 2024-08-14
Payer: MEDICAID

## 2024-08-14 DIAGNOSIS — F91.9 DISRUPTIVE BEHAVIOR DISORDER: ICD-10-CM

## 2024-08-14 DIAGNOSIS — F41.9 ANXIETY: ICD-10-CM

## 2024-08-14 PROCEDURE — 90832 PSYTX W PT 30 MINUTES: CPT | Performed by: COUNSELOR

## 2024-08-14 NOTE — PROGRESS NOTES
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
chicken dumplings

## 2024-08-21 ENCOUNTER — APPOINTMENT (OUTPATIENT)
Dept: BEHAVIORAL HEALTH | Facility: CLINIC | Age: 29
End: 2024-08-21
Payer: MEDICAID

## 2024-08-22 ENCOUNTER — APPOINTMENT (OUTPATIENT)
Dept: BEHAVIORAL HEALTH | Facility: CLINIC | Age: 29
End: 2024-08-22
Payer: MEDICAID

## 2024-08-23 ENCOUNTER — OFFICE VISIT (OUTPATIENT)
Dept: BEHAVIORAL HEALTH | Facility: CLINIC | Age: 29
End: 2024-08-23
Payer: MEDICAID

## 2024-08-23 DIAGNOSIS — F91.9 DISRUPTIVE BEHAVIOR DISORDER: ICD-10-CM

## 2024-08-23 DIAGNOSIS — F41.9 ANXIETY: ICD-10-CM

## 2024-08-23 PROCEDURE — 90834 PSYTX W PT 45 MINUTES: CPT | Performed by: COUNSELOR

## 2024-08-23 NOTE — PROGRESS NOTES
Total Time: 40 min  Diagnosis: anxiety, disruptive BX  Visit Type: epic  Reason for visit: managing his worry and disruptive BX    - notes a friend from a house next door  unexpectedly so he was pretty sad about that, but it also made him think of his grandma passing away  - notes he got a haircut which he was excited about  - talked some more about his housemate and getting along with others  - keeping busy with no work program; being out in the garden  - received some updates from  and new Q; increase in verbal aggression     Focused on:  - active listening  - coping skills  - ways to get along with peers, finding things in common  - ways to remember his recently lost friend/family memories

## 2024-08-27 ENCOUNTER — APPOINTMENT (OUTPATIENT)
Dept: BEHAVIORAL HEALTH | Facility: CLINIC | Age: 29
End: 2024-08-27
Payer: MEDICAID

## 2024-09-06 ENCOUNTER — APPOINTMENT (OUTPATIENT)
Dept: BEHAVIORAL HEALTH | Facility: CLINIC | Age: 29
End: 2024-09-06
Payer: MEDICAID

## 2024-09-06 VITALS
RESPIRATION RATE: 18 BRPM | HEART RATE: 77 BPM | DIASTOLIC BLOOD PRESSURE: 68 MMHG | TEMPERATURE: 97.8 F | SYSTOLIC BLOOD PRESSURE: 113 MMHG | WEIGHT: 146 LBS | BODY MASS INDEX: 22.87 KG/M2

## 2024-09-06 DIAGNOSIS — F91.9 DISRUPTIVE BEHAVIOR DISORDER: ICD-10-CM

## 2024-09-06 DIAGNOSIS — F48.2 PSEUDOBULBAR AFFECT: Primary | ICD-10-CM

## 2024-09-06 DIAGNOSIS — F41.9 ANXIETY: ICD-10-CM

## 2024-09-06 DIAGNOSIS — Z72.89 SELF-INJURIOUS BEHAVIOR: ICD-10-CM

## 2024-09-06 DIAGNOSIS — Z79.899 ENCOUNTER FOR LONG-TERM (CURRENT) USE OF MEDICATIONS: ICD-10-CM

## 2024-09-06 PROCEDURE — 1036F TOBACCO NON-USER: CPT | Performed by: NURSE PRACTITIONER

## 2024-09-06 PROCEDURE — 99214 OFFICE O/P EST MOD 30 MIN: CPT | Performed by: NURSE PRACTITIONER

## 2024-09-06 RX ORDER — DEXTROMETHORPHAN HYDROBROMIDE AND QUINIDINE SULFATE 20; 10 MG/1; MG/1
1 CAPSULE, GELATIN COATED ORAL EVERY 12 HOURS
Qty: 62 CAPSULE | Refills: 0 | Status: SHIPPED | OUTPATIENT
Start: 2024-09-06 | End: 2024-09-06 | Stop reason: WASHOUT

## 2024-09-06 RX ORDER — DIVALPROEX SODIUM 500 MG/1
500 TABLET, FILM COATED, EXTENDED RELEASE ORAL NIGHTLY
Qty: 30 TABLET | Refills: 3 | Status: SHIPPED | OUTPATIENT
Start: 2024-09-06 | End: 2025-01-04

## 2024-09-06 RX ORDER — BUSPIRONE HYDROCHLORIDE 5 MG/1
TABLET ORAL
Qty: 60 TABLET | Refills: 3 | Status: SHIPPED | OUTPATIENT
Start: 2024-09-06

## 2024-09-06 RX ORDER — DESVENLAFAXINE 50 MG/1
50 TABLET, EXTENDED RELEASE ORAL DAILY
Qty: 30 TABLET | Refills: 3 | Status: SHIPPED | OUTPATIENT
Start: 2024-09-06 | End: 2025-01-04

## 2024-09-06 ASSESSMENT — ABNORMAL INVOLUNTARY MOVEMENT SCALE (AIMS)
TONGUE: NONE, NORMAL
AIMS_PATIENT_INCAPACITATION: NONE, NORMAL
UPPER_BODY_EXTREMITIES: NONE, NORMAL
FACIAL_EXPRESSION_MUSCLES: NONE, NORMAL
CURRENT_PROBLEMS_TEETH_DENTURES: NO
LIPS_PARIETAL: NONE, NORMAL
JAW: NONE, NORMAL
AIMS_PATIENT_AWARENESS: NO AWARENESS
PATIENT_WEARS_DENTURES: NO
NECK_SHOULDER_HIPS: NONE, NORMAL
AIMS_SEVERITY: 0
LOWER_BODY_EXTREMITIES: NONE, NORMAL

## 2024-09-06 NOTE — PROGRESS NOTES
ASSESSMENT: Mr. Gonzalez presents unchanged in emotional dysregulation.  Emotions are out of proportion or are not congruent with what is going on around him.  Diagnosis update: Pseudobulbar affect (PBA)  See treatment plan below.     Pharmacogenomics Testing (PGT) June 2021 results reviewed:  Normal Folic Acid Conversion  Use As Directed: Desvenlafaxine (Pristiq), Buspirone (BuSpar), & sertraline (Zoloft) and Divalproex Sodium (Depakote).   Moderate gene to drug interaction: Risperdal with the clinical consideration that serum level may be too low and higher doses may be required. (Extensive metabolizers)     PLAN:                                                                                                                      problems treated   f/u requested to prevent relapse   medications renewed/re-ordered     Using quiNIDine and carvedilol together can lower your blood pressure and slow your heart rate. This can cause a slow heartbeat, headaches, dizziness, or feeling like you might pass out.     Request for cardiac clearance sent to cardiologist: Dr. Amara Bean  Ejection fraction in 2020: 25-30%.  Should be 50-70%.  A) Discontinue risperidone 3 mg by mouth twice daily for impulse control disorder and behaviors. He was previously on 5 mg daily and was not effective. HX prolactinemia November 2022: 56ng/mL (4-15). Reports asymptomatic.  Unable to obtain risperidone serum level to see how he metabolizes this medication.  History of leukopenia and neutropenia.  April 2024 WBC 2.9 (4.8-10.8); ANC 1300 (greater than 1500).  B) Rx for EKG given.  To be completed prior to starting Nuedexta.    The remaining orders will occur once cardiac clearance and EKG results received:  C) On the 8th day of not receiving Risperidone (IE last dose Risperidone received 9/6/24; on the morning of 9/14/24):  Start dextromethorphan- quinidine (Nuedexta) 20-10 mg by mouth every morning x 7 days, then increase to twice daily for  pseudobulbar affect.  9/6/24 CNS-LS (Center for Neurologic Study- Lability Scale) score 28(13 required for PBA tx)  D) Discontinue dextromethorphan-guaifenesin 5 ml Q 4 hr PRN  E) re- challenge RX for EKG given. To be completed 24-48 hours after starting Nuedexta.  2. Continue BuSpar 5 mg by mouth q AM and 5 mg by mouth at 3 PM for ROBIN.  3. Continue desvenlafaxine  50 mg by mouth every morning for anxiety.  4. Continue Divalproex Sodium (Depakote) 500 mg by mouth at HS for moods.   5. Risks, benefits, alternatives, off-label uses, and side effects of medications have been discussed with patient/caregiver. There is no report of signs/symptoms consistent with medication-induced impairment in daily functioning. At this time, benefits of medication felt to outweigh potential risks. Will continue to reassess need for psychotropic medication at regular  intervals.      6. Return to clinic Friday, October 25, 2024 at 1 PM in person or earlier if needed. Call [phone] to reschedule.     Thank you for seeing me today. If you have any questions or concerns, do not hesitate to contact my office.  Tamanna Romano    TREATMENT TYPE         counseling and coordination of care; addressing signs and symptoms of illness; risks/benefits and side effects of medications; and behavioral approaches to illness.  This note was created using electronic dictation. There may be errors in syntax and meaning. Please contact the office with any questions.  For [facility] residents, Mobile Crisis is a 24/7 hotline you can call for assistance [[phone]].   Please call 911 or go to your closest Emergency Room if you feel worse. This includes thoughts of hurting yourself or anyone else, or having other troubles such as hearing voices, seeing visions, or having new and scary thoughts about the people around you.       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      Last seen April 2024.  At that time, no medication changes.  February 2024.  At that time, sertraline was discontinued due to ineffective and low serum levels at maximum dose.  November 2023. At that time, Depakote was started.   October 2023.  At that time, no medication changes due to pending lab results.  August 2023. At that time, no medication changes.  July 2023. At that time, venlafaxine was decreased.  June 26, 2023. At that time, venlafaxine was increased and timing on BuSpar was adjusted (from 12 hours to 8 hours).  June 1, 2023. At that time, no medication changes. On venlafaxine HCL ER for 11 days and was his last day on Clonazepam.   May 2023. At that time, venlafaxine HCL ER was started.   March 2023. At that time, clonazepam was decreased due to possible disinhibition.  December 2022. At that time, no medication changes.  November 2022. At that time, BuSpar was started.  October 2022. At that time, no medication changes.  July 2022. At that time, Risperdal was restarted per request for skin excoriation, Ziprasidone and Colace were discontinued.  May 2022. At that time, sertraline (Zoloft) was increased.  April 2022: sertraline was increased.   February 23, 2022. At that time, Sertraline was increased.  February 1, 2022. At that time, NAC was DC'd, Ziprasidone (Geodon) & Clonazepam were increased.  January 5, 2022. At that time, Geodon (Ziprasidone) was started   November 2021. At that time, Lurasidone (Latuda) was increased.  September 2021. At that time, Abilify was DC'd (not effective) and Latuda was started (chosen dt new cardiac concerns).  August 2021. At that time, sertraline (ZoloftÂ®) was increased.   June 2021. At that time, fluoxetine " "(Prozac) was DC'd & sertraline (ZoloftÂ®) was started.   May 2021. At that time, Abilify was increased.  April 2021. At that time, no medication changes.  February 2021. At that time, no medication changes.  January 28, 2021. At that time, no medication changes.  January 5, 2021. At that time, Klonopin was decreased, Abilify & Colace were started.  November 2020. At that time, NAC was increased.   October 2020. At that time, Klonopin was decreased and Fluoxetine was increased.   August 2020. At that time, no medication changes.  July 2020. At that time, NAC was increased.  May 2020. At that time, no medication changes.  January 2020. At that time, no medication changes.  November 2019. At that time, NAC was increased. Jan 2020, Risperdal was DC'd.   September. At that time, Risperdal was increased and NAC was started.   Aug 2019. At that time, fluoxetine was increased and Risperdal was decreased.  June 2019. At that time, fluoxetine was increased and Risperdal was decreased.  April 2019. At that time, no med changes.     Cheko reports that he is \"good\".  No longer in Day Program dt not attending and refusing activities. He prefers to be on his iPad.  On waiting list to go to [facility]. Increased frustration and anger focused at peers.  Agenesis of the corpus callosum  PHQ-9 score 5 on 4/30/24  Staff report that a significant change in that he is now specifically identifying a day that he will \"kill\" or \"shoot\" himself.  For example, I am going to shoot myself on Friday.  Staff report he always appears negative.  He can become in a rage at any moment, readily on edge, crying one moment, never really appears aggressive towards staff.  Increased skin excoriation.  Staff reports that his behaviors are incongruent with what is going on and are out of proportion.    Staff concerned with SIB with injury: scratches on face, hits his head, and will pull off his own finger/toe nails if bothering him.  All day \"Bullying\" of " "peer that is lower functioning (not able to defend self against Cheko).  has been working and staying busy.  Had a picnic in the pavilion with his family yesterday.  States he feels and gets along pretty good lately.  Staff report SIB 45 min of pounding on head in frustration after peer involvement, also hitting self more.   No de-escalation would work. Hitting dents into the wall.   Repeats kill myself verbals.     He visits his Mom and goes to [employer]. He enjoys going there, especially for the cats. One [place] cat, Maribell, sleeps on his chest.  5/20/23 started taking venlafaxine HCL ER. Significant change in behaviors: worse: hitting self and others more. Threatening to harm and kill himself more. Increased behaviors surrounding wanting to eat more. Describes as increased obsessions with food. Increased SIB: scratching (scratches on face) & hitting self (sores on back of head). Property destruction: broke his iPad. Closer to hitting others. Increased frustration level with longer, more difficult periods to calm down. Redirection no longer works. IE: shoe fell off; took 30+ minutes to calm.  \"Angry with self constantly\"  Seriousness of targeting peers with more cognitive issues, communication concerns and those with WC or walkers. Has never gone away.      Cheko reports that his sleep \"on and off.\" With help from staff, sleeping through the night, with 1 awakening to use RR and back to sleep.  If not busy, he will lay in recliner and sleep.   He has been staying busy. Doing extra work.  [place] [facility] [place]- frustration with losing sight. Requiring holding onto staff's arm in unfamiliar places. [place] more hope after apt. Adaptive piece to magnify iPad.   Deficits causing increase in depression. Resorts to saying he is going to kill himself at any frustration.     Ripped off right middle toenail.  Targets 4 peers in the home, Drastic shift in moods from irritable to laughing.     4/29/23 [facility] in [place] for " bronchitis  PHQ 9 on 5/4/23 score 15 moderately severe  ORBIN 7 on 5/4/23 score 16 severe anxiety     his grandma passed away November 2022. It's been hard, but he looks at FB pictures and distracts by talking to staff and working.     tries to find ways to control having anxiety attacks:         7/4/22 SIB violent hitting head, threatening to kill self & others, 911 called and squadded to  ER.      Continues with cardiac services. On 3 heart medications  Appetite decreased when anxious     Infatuation with others dirty attends w/sexual arousal  Increased urinary incontinence; especially when frustrated & in the night     Resides: [place] since October 5, 2021. His home has 7 same-age male peers. Shares room w/1 male peer. Caregiver reports that he still remains angry 90% of the time. Irritability with threatening, punching walls, kicking items, and waving his fists/arm swings.     He utilizes headphones, which appear to be noise canceling. Tries to use his iPad, but having difficulty.  SIB. Pounds the sided of his temples, punch and kick the door, only when he does something he feels wrong. IE: drops something or gets something wrong.  If he runs into the wall, he will want to punch the wall.  Towards others: will raise his arm like he is going to punch them.     MEDICATIONS: Past: Ritalin- effect unknown.  Adderall, Strattera and Concerta caused emotional changesâ€“moodiness.  Sertraline (Zoloft)â€“appeared to be ineffective.  With the start of BuSpar, staff note improvement in fighting with others, anger, verbal threats towards others and self. Continues to have irritability towards same peer. Staff report skin excoriation improved. No open areas: fingers, back of head, nose and in ear.   Abilify- minimal improvement in irritably  ziprasidone (Geodon)- had not yet been maximized to its full potential. Declined increase. Med education provided that this was not a medication failure. May need to  "consider using again in the future.  Psy/SI/HI/aggression: Denies A/VH, paranoia. Denies SI/HI  Continuous talking appears to be ongoing since prior to age 9.  SIB: easily frustrated. skin excoriation on fingers.      Appetite: good. 2016. Dx dysphagia velopharyngeal insufficiency. Seen & evaluation by otolaryngic Dr. New Mahmood. No concerns noted & recommended speech therapy. Chopped tough meats.   Daily schedule: [employer] since September 2019. He comes home happy.      History of 2-infantile surgeries:Hypospadius & inguinal hernia and hydrocele for repair.  Med Physicians:  PCP:Dr. Smith: routine & PRN visits.   Endocrinology: Dr. Castano from [facility].   Genetics: Dr. Marlen Rogers. Denied DX of Marfan Syndrome & Ruiz- Fryns Syndrome  Cardiologist: Started carvedilol for Left ventricular dysfunction.  Evaluated y Dr. Amara Bean, on 5/13/21  Dentist: Routine.   Hx of excessive saliva. Most likely dt keeping his head down, chin on chest. With the DC of Risperdal, Cheko reports that he no longer bites the inside of his cheek. He also seems to be able to enunciate better.   Eye: Wears bifocals   Neurologist: last seen in 2016. Appears primary care doctor prescribes Lamictal for epilepsy. Unknown last seizure. Per old chart, seizures started at approximately 8 years of age. Has upward eye rolling with facial twitching and fist clenching. EEG at that time, mother reported was normal.  Neurologist diagnosed ADHD; \"Fidgety boy who bounces,\" history of impulsiveness with darting, issues keeping his hands to himself, growling behaviors, a worrier who is bothered by noises from the smoke detector or a fire alarm. He insists on things being arranged in a certain way, somewhat fearful above low tolerance and is easily stressed.     ALLERGIES: NKDA     MEDICAL REVIEW OF SYSTEMS:  GEN: denies fever, chills, night sweats  HEENT: denies changes in vision, eye pain, hearing changes, no symptoms of upper respiratory " "infection  CARDIO: denies chest pain and palpitations   RESP: denies shortness of breath  GI: denies nausea/vomiting/constipation/diarrhea, or blood in the stool  : denies burning/frequency/urgency, or bloody urine  NEURO: denies tremor, dizziness, numbness or tingling. Hx infantile seizures  MSK: denies muscle spasms or stiffness. See 'Communication\" below. Sits with torso leaning to left, difficult for him to sit straight & when head is looking at this writer, left ear tilted towards his left shoulder. Asked ct to stand: Left shoulder approx 3 inches higher than right. Lumbar Lordosis noted.  Suspect Structural scoliosis  DERM: denies new onset of rash     Med SE: none noted or reported     COMPLIANCE: good     MMS:  ORIENTATION   alert  ambulatory  cooperative   dysmorphic features     BEHAVIOR:  enters easily  eye contact normal  gait normal  reciprocal interaction  spontaneous speech     MEMORY:  poor remote  poor recent     SENSORY :  Bifocal glasses     COMMUNICATION:  conversational  speech dysarthria- very difficult to understand. Per chart: nasal talk. Improved with Risperdal DC.   Facial asymmetrical. Entire left side of face lower than right. Question if part of speech issue.   Staff state stroke has been ruled out.     AFFECT:  normal/full     MOOD: anxious      THOUGHT PROCESS:  concrete   perseverative      THOUGHT Content:  obsessive     CONCENTRATION  Easily distracted     FUND OF KNOWLEDGE :  mod disability- fx at approx age 6 yo     JUDGEMENT: posed situations     EPS: none noted or reported.   AIMS negative 9/6/2024     LABS REVIEWED:  July 2024 VPA 25.8 micrograms/mL ()  April 2024  WBC 2.9  4.8 - 10.8 K/uL   Dec 2023   Valproic Acid Lvl 36.7 ug/mL  50.0 - 100.0   Lamotrigine Lvl 9.6 ug/mL  3.0 - 15.0 ug/mL   May 2023 in chart  November 2022 prolactin 56 NG/mL (4â€“15)  Oct 2020:  TSH, Thyroid, T4, prolactin & Cortisol- WNL  October 2019:  CBC/Diff: WBC 3.7; RBC 4.35  TSH, Lipid, & " Vitamin D (58)- WNL  CMP: Alk phos 106; creatinine 0.57, Globulin 2.2      Feb 2019: reviewed. WBC, RBC, H/H were low at that time.     EKG = sees cardiologist  July 2022   Normal sinus rhythm  Right axis deviation  Incomplete right bundle branch block  Abnormal ECG  74 bpm  No previous ECGs available    Qtc 412 ms  May 2021  73 bpm  QTc 412  May 2019:   85 bpm  QTc 435

## 2024-09-06 NOTE — PATIENT INSTRUCTIONS
ASSESSMENT: Mr. Gonzalez presents unchanged in emotional dysregulation.  Emotions are out of proportion or are not congruent with what is going on around him.  Diagnosis update: Pseudobulbar affect (PBA)  See treatment plan below.     Pharmacogenomics Testing (PGT) June 2021 results reviewed:  Normal Folic Acid Conversion  Use As Directed: Desvenlafaxine (Pristiq), Buspirone (BuSpar), & sertraline (Zoloft) and Divalproex Sodium (Depakote).   Moderate gene to drug interaction: Risperdal with the clinical consideration that serum level may be too low and higher doses may be required. (Extensive metabolizers)     PLAN:                                                                                                                      problems treated   f/u requested to prevent relapse   medications renewed/re-ordered     Using quiNIDine and carvedilol together can lower your blood pressure and slow your heart rate. This can cause a slow heartbeat, headaches, dizziness, or feeling like you might pass out.     Request for cardiac clearance sent to cardiologist: Dr. Amara Bean  Ejection fraction in 2020: 25-30%.  Should be 50-70%.  A) Discontinue risperidone 3 mg by mouth twice daily for impulse control disorder and behaviors. He was previously on 5 mg daily and was not effective. HX prolactinemia November 2022: 56ng/mL (4-15). Reports asymptomatic.  Unable to obtain risperidone serum level to see how he metabolizes this medication.  History of leukopenia and neutropenia.  April 2024 WBC 2.9 (4.8-10.8); ANC 1300 (greater than 1500).  B) Rx for EKG given.  To be completed prior to starting Nuedexta.    The remaining orders will occur once cardiac clearance and EKG results received:  C) On the 8th day of not receiving Risperidone (IE last dose Risperidone received 9/6/24; on the morning of 9/14/24):  Start dextromethorphan- quinidine (Nuedexta) 20-10 mg by mouth every morning x 7 days, then increase to twice daily for  pseudobulbar affect.  9/6/24 CNS-LS (Center for Neurologic Study- Lability Scale) score 28(13 required for PBA tx)  D) Discontinue dextromethorphan-guaifenesin 5 ml Q 4 hr PRN  E) re- challenge RX for EKG given. To be completed 24-48 hours after starting Nuedexta.  2. Continue BuSpar 5 mg by mouth q AM and 5 mg by mouth at 3 PM for ROBIN.  3. Continue desvenlafaxine  50 mg by mouth every morning for anxiety.  4. Continue Divalproex Sodium (Depakote) 500 mg by mouth at HS for moods.   5. Risks, benefits, alternatives, off-label uses, and side effects of medications have been discussed with patient/caregiver. There is no report of signs/symptoms consistent with medication-induced impairment in daily functioning. At this time, benefits of medication felt to outweigh potential risks. Will continue to reassess need for psychotropic medication at regular  intervals.      6. Return to clinic Friday, October 25, 2024 at 1 PM in person or earlier if needed. Call [phone] to reschedule.     Thank you for seeing me today. If you have any questions or concerns, do not hesitate to contact my office.  Tamanna Romano    TREATMENT TYPE         counseling and coordination of care; addressing signs and symptoms of illness; risks/benefits and side effects of medications; and behavioral approaches to illness.  This note was created using electronic dictation. There may be errors in syntax and meaning. Please contact the office with any questions.  For [facility] residents, Mobile Crisis is a 24/7 hotline you can call for assistance [[phone]].   Please call 911 or go to your closest Emergency Room if you feel worse. This includes thoughts of hurting yourself or anyone else, or having other troubles such as hearing voices, seeing visions, or having new and scary thoughts about the people around you.

## 2024-09-10 ENCOUNTER — APPOINTMENT (OUTPATIENT)
Dept: BEHAVIORAL HEALTH | Facility: CLINIC | Age: 29
End: 2024-09-10
Payer: MEDICAID

## 2024-09-17 ENCOUNTER — TELEMEDICINE (OUTPATIENT)
Dept: BEHAVIORAL HEALTH | Facility: CLINIC | Age: 29
End: 2024-09-17
Payer: MEDICAID

## 2024-09-17 ENCOUNTER — APPOINTMENT (OUTPATIENT)
Dept: BEHAVIORAL HEALTH | Facility: CLINIC | Age: 29
End: 2024-09-17
Payer: MEDICAID

## 2024-09-17 DIAGNOSIS — F91.9 DISRUPTIVE BEHAVIOR DISORDER: ICD-10-CM

## 2024-09-17 DIAGNOSIS — F41.9 ANXIETY: ICD-10-CM

## 2024-09-17 PROCEDURE — 90834 PSYTX W PT 45 MINUTES: CPT | Performed by: COUNSELOR

## 2024-09-17 NOTE — PROGRESS NOTES
Total Time: 38 min (4 virtual, 34 via phone)  Diagnosis: anxiety, disruptive BX  Visit Type: via phone (their laptop had no sound)  Reason for visit: managing his worry and disruptive BX    - notes he is bored without a job, is nervous to start, thinks he wont be able to see down the hallways  - notes hes been “up to no good” and picking on peers and telling them what to do  - agreed to “try 3 things”- start positive, ask staff for help to redirect his peers, and do Spiderman stretches  - staff: think he is a bully, want him to work on all the things, agreed to start small, want him to process his emotions; get him to baseline then process what happened    Focused on:  - active listening  - coping skills  - ways to get along with peers, finding things in common; work his list of 3  - processing emotions, emotion identification, regulation, expression

## 2024-09-18 ENCOUNTER — HOSPITAL ENCOUNTER (OUTPATIENT)
Dept: CARDIOLOGY | Facility: HOSPITAL | Age: 29
Discharge: HOME | End: 2024-09-18
Payer: MEDICAID

## 2024-09-18 DIAGNOSIS — Z79.899 ENCOUNTER FOR LONG-TERM (CURRENT) USE OF MEDICATIONS: ICD-10-CM

## 2024-09-18 LAB
ATRIAL RATE: 82 BPM
P AXIS: 64 DEGREES
P OFFSET: 185 MS
P ONSET: 139 MS
PR INTERVAL: 148 MS
Q ONSET: 213 MS
QRS COUNT: 13 BEATS
QRS DURATION: 92 MS
QT INTERVAL: 360 MS
QTC CALCULATION(BAZETT): 420 MS
QTC FREDERICIA: 399 MS
R AXIS: 100 DEGREES
T AXIS: 28 DEGREES
T OFFSET: 393 MS
VENTRICULAR RATE: 82 BPM

## 2024-09-18 PROCEDURE — 93010 ELECTROCARDIOGRAM REPORT: CPT | Performed by: INTERNAL MEDICINE

## 2024-09-18 PROCEDURE — 93005 ELECTROCARDIOGRAM TRACING: CPT

## 2024-09-20 ENCOUNTER — OFFICE VISIT (OUTPATIENT)
Dept: NEUROLOGY | Age: 29
End: 2024-09-20
Payer: MEDICAID

## 2024-09-20 VITALS
WEIGHT: 147 LBS | HEART RATE: 100 BPM | SYSTOLIC BLOOD PRESSURE: 122 MMHG | DIASTOLIC BLOOD PRESSURE: 67 MMHG | BODY MASS INDEX: 21.09 KG/M2

## 2024-09-20 DIAGNOSIS — H55.00 NYSTAGMUS: ICD-10-CM

## 2024-09-20 DIAGNOSIS — Q04.0 AGENESIS OF CORPUS CALLOSUM (HCC): ICD-10-CM

## 2024-09-20 DIAGNOSIS — R56.9 GENERALIZED SEIZURES (HCC): ICD-10-CM

## 2024-09-20 DIAGNOSIS — H47.033 OPTIC NERVE HYPOPLASIA, BILATERAL: ICD-10-CM

## 2024-09-20 DIAGNOSIS — D35.2 PROLACTINOMA (HCC): ICD-10-CM

## 2024-09-20 DIAGNOSIS — R56.9 GENERALIZED SEIZURES (HCC): Primary | ICD-10-CM

## 2024-09-20 DIAGNOSIS — R26.9 ABNORMALITY OF GAIT AND MOBILITY: ICD-10-CM

## 2024-09-20 DIAGNOSIS — R29.91 MARFANOID HABITUS: ICD-10-CM

## 2024-09-20 DIAGNOSIS — G40.409 OTHER GENERALIZED EPILEPSY, NOT INTRACTABLE, WITHOUT STATUS EPILEPTICUS (HCC): ICD-10-CM

## 2024-09-20 LAB
ALBUMIN SERPL-MCNC: 4.7 G/DL (ref 3.5–4.6)
ALP SERPL-CCNC: 91 U/L (ref 35–104)
ALT SERPL-CCNC: 28 U/L (ref 0–41)
AST SERPL-CCNC: 21 U/L (ref 0–40)
BASOPHILS # BLD: 0.1 K/UL (ref 0–0.2)
BASOPHILS NFR BLD: 2 %
BILIRUB DIRECT SERPL-MCNC: <0.2 MG/DL (ref 0–0.4)
BILIRUB INDIRECT SERPL-MCNC: ABNORMAL MG/DL (ref 0–0.6)
BILIRUB SERPL-MCNC: 0.3 MG/DL (ref 0.2–0.7)
EOSINOPHIL # BLD: 0.1 K/UL (ref 0–0.7)
EOSINOPHIL NFR BLD: 2 %
ERYTHROCYTE [DISTWIDTH] IN BLOOD BY AUTOMATED COUNT: 12.7 % (ref 11.5–14.5)
HCT VFR BLD AUTO: 43.1 % (ref 42–52)
HGB BLD-MCNC: 15.2 G/DL (ref 14–18)
LYMPHOCYTES # BLD: 1 K/UL (ref 1–4.8)
LYMPHOCYTES NFR BLD: 27 %
MCH RBC QN AUTO: 31.3 PG (ref 27–31.3)
MCHC RBC AUTO-ENTMCNC: 35.3 % (ref 33–37)
MCV RBC AUTO: 88.9 FL (ref 79–92.2)
MONOCYTES # BLD: 0.3 K/UL (ref 0.2–0.8)
MONOCYTES NFR BLD: 8.6 %
NEUTROPHILS # BLD: 1.9 K/UL (ref 1.4–6.5)
NEUTS SEG NFR BLD: 58 %
PLATELET # BLD AUTO: 176 K/UL (ref 130–400)
PLATELET BLD QL SMEAR: ADEQUATE
PROLACTIN SERPL-MCNC: 40.1 NG/ML (ref 4–15.2)
PROT SERPL-MCNC: 7.4 G/DL (ref 6.3–8)
RBC # BLD AUTO: 4.85 M/UL (ref 4.7–6.1)
SLIDE REVIEW: ABNORMAL
SMUDGE CELLS BLD QL SMEAR: 7.6
VARIANT LYMPHS NFR BLD: 3 %
WBC # BLD AUTO: 3.3 K/UL (ref 4.8–10.8)

## 2024-09-20 PROCEDURE — 99214 OFFICE O/P EST MOD 30 MIN: CPT | Performed by: PSYCHIATRY & NEUROLOGY

## 2024-09-22 LAB — LAMOTRIGINE SERPL-MCNC: 9.9 UG/ML (ref 3–15)

## 2024-09-23 DIAGNOSIS — F48.2 PBA (PSEUDOBULBAR AFFECT): ICD-10-CM

## 2024-09-23 NOTE — PROGRESS NOTES
Start dextromethorphan- quinidine (Nuedexta) 20-10 mg by mouth every morning x 7 days, then increase to twice daily for pseudobulbar affect.

## 2024-09-24 ENCOUNTER — TELEPHONE (OUTPATIENT)
Dept: BEHAVIORAL HEALTH | Facility: CLINIC | Age: 29
End: 2024-09-24
Payer: MEDICAID

## 2024-10-08 ENCOUNTER — TELEMEDICINE (OUTPATIENT)
Dept: BEHAVIORAL HEALTH | Facility: CLINIC | Age: 29
End: 2024-10-08
Payer: MEDICAID

## 2024-10-08 ENCOUNTER — APPOINTMENT (OUTPATIENT)
Dept: PRIMARY CARE | Facility: CLINIC | Age: 29
End: 2024-10-08
Payer: MEDICAID

## 2024-10-08 DIAGNOSIS — F91.9 DISRUPTIVE BEHAVIOR DISORDER: ICD-10-CM

## 2024-10-08 DIAGNOSIS — F41.9 ANXIETY: ICD-10-CM

## 2024-10-08 PROCEDURE — 90832 PSYTX W PT 30 MINUTES: CPT | Performed by: COUNSELOR

## 2024-10-08 NOTE — PROGRESS NOTES
Total Time: 32 min  Diagnosis: anxiety, disruptive BX  Visit Type: epic  Reason for visit: managing his worry and disruptive BX    - notes overall things have been better; still struggling a bit with peers, but overall better  - ripped off his nail the other day, reports he is not sure why, “felt like it”  - could be better with his stretching, not as consistent, is doing well with his list and starting positive  - closer to a start date with day hab  - spent a lot of time talking about his eye sight challenge    Focused on:  - active listening  - coping skills  - ways to get along with peers, finding things in common; work his list of 3, adding in walking  - processing emotions, emotion identification, regulation, expression

## 2024-10-25 ENCOUNTER — APPOINTMENT (OUTPATIENT)
Dept: BEHAVIORAL HEALTH | Facility: CLINIC | Age: 29
End: 2024-10-25
Payer: MEDICAID

## 2024-10-25 VITALS
RESPIRATION RATE: 17 BRPM | TEMPERATURE: 98 F | HEART RATE: 92 BPM | SYSTOLIC BLOOD PRESSURE: 108 MMHG | DIASTOLIC BLOOD PRESSURE: 70 MMHG

## 2024-10-25 DIAGNOSIS — Z72.89 SELF-INJURIOUS BEHAVIOR: ICD-10-CM

## 2024-10-25 DIAGNOSIS — F41.9 ANXIETY: ICD-10-CM

## 2024-10-25 DIAGNOSIS — Z79.899 ENCOUNTER FOR LONG-TERM (CURRENT) USE OF MEDICATIONS: ICD-10-CM

## 2024-10-25 DIAGNOSIS — F91.9 DISRUPTIVE BEHAVIOR DISORDER: ICD-10-CM

## 2024-10-25 DIAGNOSIS — F48.2 PBA (PSEUDOBULBAR AFFECT): ICD-10-CM

## 2024-10-25 DIAGNOSIS — G25.0 ESSENTIAL TREMOR: ICD-10-CM

## 2024-10-25 DIAGNOSIS — F48.2 PSEUDOBULBAR AFFECT: Primary | ICD-10-CM

## 2024-10-25 PROCEDURE — 99215 OFFICE O/P EST HI 40 MIN: CPT | Performed by: NURSE PRACTITIONER

## 2024-10-25 PROCEDURE — 1036F TOBACCO NON-USER: CPT | Performed by: NURSE PRACTITIONER

## 2024-10-25 RX ORDER — PROPRANOLOL HYDROCHLORIDE 10 MG/1
5 TABLET ORAL 2 TIMES DAILY
Qty: 10 TABLET | Refills: 0 | Status: SHIPPED | OUTPATIENT
Start: 2024-10-25 | End: 2024-11-04

## 2024-10-25 RX ORDER — BUSPIRONE HYDROCHLORIDE 5 MG/1
TABLET ORAL
Qty: 60 TABLET | Refills: 3 | Status: SHIPPED | OUTPATIENT
Start: 2024-10-25

## 2024-10-25 RX ORDER — DESVENLAFAXINE 50 MG/1
50 TABLET, EXTENDED RELEASE ORAL DAILY
Qty: 30 TABLET | Refills: 3 | Status: SHIPPED | OUTPATIENT
Start: 2024-10-25 | End: 2025-02-22

## 2024-10-25 RX ORDER — DIVALPROEX SODIUM 500 MG/1
500 TABLET, FILM COATED, EXTENDED RELEASE ORAL NIGHTLY
Qty: 30 TABLET | Refills: 3 | Status: SHIPPED | OUTPATIENT
Start: 2024-10-25 | End: 2025-02-22

## 2024-10-25 NOTE — PROGRESS NOTES
ASSESSMENT: Mr. Gonzalez presents second day on Nuedexta.  Difficult time off of risperidone.  withdrawal tremor noted during assessment and reported starting as soon as he discontinued risperidone.  Even though he is already taking quinidine (part of Nuedexta) along with carvedilol, 10-day prescription of propranolol given.  If he appears oversedated or at risk for falls, discontinue propranolol immediately.  See treatment plan below.     Pharmacogenomics Testing (PGT) June 2021 results reviewed:  Normal Folic Acid Conversion  Use As Directed: Desvenlafaxine (Pristiq), Buspirone (BuSpar), & sertraline (Zoloft) and Divalproex Sodium (Depakote).   Moderate gene to drug interaction: Risperdal with the clinical consideration that serum level may be too low and higher doses may be required. (Extensive metabolizers). He was previously on 5 mg daily and was not effective. HX prolactinemia November 2022: 56ng/mL (therapeutic range 4-15). Reports asymptomatic. Unable to obtain risperidone serum level to see how he metabolizes this medication. History of leukopenia and neutropenia. April 2024 WBC 2.9 (range should be 4.8-10.8); ANC 1300 (should be greater than 1500).      PLAN:                                                                                                                      problems treated   f/u requested to prevent relapse   medications renewed/re-ordered     Using quiNIDine and carvedilol together can lower your blood pressure and slow your heart rate. This can cause a slow heartbeat, headaches, dizziness, or feeling like you might pass out.     Request for cardiac clearance sent to cardiologist: Dr. Amara Bean  Ejection fraction in 2020: 25-30%.  Should be 50-70%.  Start propranolol 5 mg by mouth twice a day for 10 days, then discontinue for tremors related to discontinuation of risperidone.  Increase to BID now: dextromethorphan- quinidine (Nuedexta) 20-10 mg by mouth twice daily for  pseudobulbar affect.  9/6/24 CNS-LS (Center for Neurologic Study- Lability Scale) score 28(13 required for PBA tx)  A) re- challenge RX for EKG given. To be completed 24-48 hours after starting Nuedexta.  3. Continue desvenlafaxine  50 mg by mouth every morning for anxiety.  4. Continue Divalproex Sodium (Depakote) 500 mg by mouth at HS for moods.   5. Continue BuSpar 5 mg by mouth q AM and 5 mg by mouth at 3 PM for ROBIN.  6. Risks, benefits, alternatives, off-label uses, and side effects of medications have been discussed with patient/caregiver. There is no report of signs/symptoms consistent with medication-induced impairment in daily functioning. At this time, benefits of medication felt to outweigh potential risks. Will continue to reassess need for psychotropic medication at regular  intervals.      7. Return to clinic January 16, 2025 at 8 AM virtual or earlier if needed. Call [phone] to reschedule.     Thank you for seeing me today. If you have any questions or concerns, do not hesitate to contact my office.  Tamanna Romano    TREATMENT TYPE         counseling and coordination of care; addressing signs and symptoms of illness; risks/benefits and side effects of medications; and behavioral approaches to illness.  This note was created using electronic dictation. There may be errors in syntax and meaning. Please contact the office with any questions.  For [facility] residents, [employer] is a 24/7 hotline you can call for assistance [[phone]].   Please call 911 or go to your closest Emergency Room if you feel worse. This includes thoughts of hurting yourself or anyone else, or having other troubles such as hearing voices, seeing visions, or having new and scary thoughts about the people around you.       PRESENT FOR APPOINTMENT  Client   NAHID Gongora, known 1.5 yrs  Ai Hodgson  Not present: Isabelle Marquez, mother/Guardian  Virtual or Telephone Consent    An interactive audio and video  "telecommunication system which permits real time communications between the patient (at the originating site) and provider (at the distant site) was utilized to provide this telehealth service.   Verbal consent was requested and obtained from Kehinde Gonzalez on this date, 10/25/24 for a telehealth visit.       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      Last seen September 2024.  At that time, risperidone and dextromethorphan-guaifenesin were discontinued. dextromethorphan- quinidine (Nuedexta) was started.  April 2024.  At that time, no medication changes.  February 2024.  At that time, sertraline was discontinued due to ineffective and low serum levels at maximum dose.  November 2023. At that time, Depakote was started.   October 2023.  At that time, no medication changes due to pending lab results.  August 2023. At that time, no medication changes.  July 2023. At that time, venlafaxine was decreased.  June 26, 2023. At that time, venlafaxine was increased and timing on BuSpar was adjusted (from 12 hours to 8 hours).  June 1, 2023. At that time, no medication changes. On venlafaxine HCL ER for 11 days and was his last day on Clonazepam.   May 2023. At that time, venlafaxine HCL ER was started.   March 2023. At that time, clonazepam was decreased due to possible disinhibition.  December 2022. At that time, no medication changes.  November 2022. At that time, BuSpar was started.  October 2022. At that time, no medication changes.  July 2022. At that time, Risperdal was restarted per request for skin excoriation, Ziprasidone and Colace were discontinued.  May 2022. At that time, sertraline (Zoloft) was increased.  April 2022: sertraline was increased.   February 23, 2022. At that time, Sertraline was increased.  February 1, 2022. At that time, NAC was DC'd, Ziprasidone " "(Geodon) & Clonazepam were increased.  January 5, 2022. At that time, Geodon (Ziprasidone) was started   November 2021. At that time, Lurasidone (Latuda) was increased.  September 2021. At that time, Abilify was DC'd (not effective) and Latuda was started (chosen dt new cardiac concerns).  August 2021. At that time, sertraline (ZoloftÂ®) was increased.   June 2021. At that time, fluoxetine (Prozac) was DC'd & sertraline (ZoloftÂ®) was started.   May 2021. At that time, Abilify was increased.  April 2021. At that time, no medication changes.  February 2021. At that time, no medication changes.  January 28, 2021. At that time, no medication changes.  January 5, 2021. At that time, Klonopin was decreased, Abilify & Colace were started.  November 2020. At that time, NAC was increased.   October 2020. At that time, Klonopin was decreased and Fluoxetine was increased.   August 2020. At that time, no medication changes.  July 2020. At that time, NAC was increased.  May 2020. At that time, no medication changes.  January 2020. At that time, no medication changes.  November 2019. At that time, NAC was increased. Jan 2020, Risperdal was DC'd.   September. At that time, Risperdal was increased and NAC was started.   Aug 2019. At that time, fluoxetine was increased and Risperdal was decreased.  June 2019. At that time, fluoxetine was increased and Risperdal was decreased.  April 2019. At that time, no med changes.     Cheko reports that he is \"good\".  No longer in Day Program dt not attending and refusing activities. He prefers to be on his iPad.  On waiting list to go to [facility]. Increased frustration and anger focused at peers.  Agenesis of the corpus callosum  PHQ-9 score 5 on 4/30/24  Staff report that a significant change in that he is now specifically identifying a day that he will \"kill\" or \"shoot\" himself.  For example, I am going to shoot myself on Friday.  Staff report he always appears negative.  He can become " "in a rage at any moment, readily on edge, crying one moment, never really appears aggressive towards staff.  Increased skin excoriation.  Staff reports that his behaviors are incongruent with what is going on and are out of proportion.    Staff concerned with SIB with injury: scratches on face, hits his head, and will pull off his own finger/toe nails if bothering him.  All day \"Bullying\" of peer that is lower functioning (not able to defend self against Cheko).  has been working and staying busy.  Had a picnic in the pavilion with his family yesterday.  States he feels and gets along pretty good lately.  Staff report SIB 45 min of pounding on head in frustration after peer involvement, also hitting self more.   No de-escalation would work. Hitting dents into the wall.   Repeats kill myself verbals.     He visits his Mom and goes to [place]. He enjoys going there, especially for the cats. One [place]ico cat, Maribell, sleeps on his chest.  5/20/23 started taking venlafaxine HCL ER. Significant change in behaviors: worse: hitting self and others more. Threatening to harm and kill himself more. Increased behaviors surrounding wanting to eat more. Describes as increased obsessions with food. Increased SIB: scratching (scratches on face) & hitting self (sores on back of head). Property destruction: broke his iPad. Closer to hitting others. Increased frustration level with longer, more difficult periods to calm down. Redirection no longer works. IE: shoe fell off; took 30+ minutes to calm.  \"Angry with self constantly\"  Seriousness of targeting peers with more cognitive issues, communication concerns and those with WC or walkers. Has never gone away.      Cheko reports that his sleep \"on and off.\" With help from staff, sleeping through the night, with 1 awakening to use RR and back to sleep.  If not busy, he will lay in recliner and sleep.   He has been staying busy. Doing extra work.  [facility]- frustration " with losing sight. Requiring holding onto staff's arm in unfamiliar places. [place] more hope after apt. Adaptive piece to magnify iPad.   Deficits causing increase in depression. Resorts to saying he is going to kill himself at any frustration.     Ripped off right middle toenail.  Targets 4 peers in the home, Drastic shift in moods from irritable to laughing.     4/29/23 [facility] ER in [place] for bronchitis  PHQ 9 on 5/4/23 score 15 moderately severe  ROBIN 7 on 5/4/23 score 16 severe anxiety     his grandma passed away November 2022. It's been hard, but he looks at FB pictures and distracts by talking to staff and working.     tries to find ways to control having anxiety attacks:         7/4/22 SIB violent hitting head, threatening to kill self & others, 911 called and squadded to  ER.      Continues with cardiac services. On 3 heart medications  Appetite decreased when anxious     Infatuation with others dirty attends w/sexual arousal  Increased urinary incontinence; especially when frustrated & in the night     Resides: [place] since October 5, 2021. His home has 7 same-age male peers. Shares room w/1 male peer. Caregiver reports that he still remains angry 90% of the time. Irritability with threatening, punching walls, kicking items, and waving his fists/arm swings.     He utilizes headphones, which appear to be noise canceling. Tries to use his iPad, but having difficulty.  SIB. Pounds the sided of his temples, punch and kick the door, only when he does something he feels wrong. IE: drops something or gets something wrong.  If he runs into the wall, he will want to punch the wall.  Towards others: will raise his arm like he is going to punch them.     MEDICATIONS: Past: Ritalin- effect unknown.  Adderall, Strattera and Concerta caused emotional changesâ€“moodiness.  Sertraline (Zoloft)â€“appeared to be ineffective.  With the start of BuSpar, staff note improvement in fighting with others, anger,  "verbal threats towards others and self. Continues to have irritability towards same peer. Staff report skin excoriation improved. No open areas: fingers, back of head, nose and in ear.   Abilify- minimal improvement in irritably  ziprasidone (Geodon)- had not yet been maximized to its full potential. Declined increase. Med education provided that this was not a medication failure. May need to consider using again in the future.  Psy/SI/HI/aggression: Denies A/VH, paranoia. Denies SI/HI  Continuous talking appears to be ongoing since prior to age 9.  SIB: easily frustrated. skin excoriation on fingers.      Appetite: good. 2016. Dx dysphagia velopharyngeal insufficiency. Seen & evaluation by otolaryngic Dr. New Mahmood. No concerns noted & recommended speech therapy. Chopped tough meats.   Daily schedule: Learning [employer], since September 2019. He comes home happy.      History of 2-infantile surgeries:Hypospadius & inguinal hernia and hydrocele for repair.  Med Physicians:  PCP:Dr. Smith: routine & PRN visits.   Endocrinology: Dr. Castano from [facility].   Genetics: Dr. Marlen Rogers. Denied DX of Marfan Syndrome & Ruiz- Fryns Syndrome  Cardiologist: Started carvedilol for Left ventricular dysfunction.  Evaluated y Dr. Amara Bean, on 5/13/21  Dentist: Routine.   Hx of excessive saliva. Most likely dt keeping his head down, chin on chest. With the DC of Risperdal, Cheko reports that he no longer bites the inside of his cheek. He also seems to be able to enunciate better.   Eye: Wears bifocals   Neurologist: last seen in 2016. Appears primary care doctor prescribes Lamictal for epilepsy. Unknown last seizure. Per old chart, seizures started at approximately 8 years of age. Has upward eye rolling with facial twitching and fist clenching. EEG at that time, mother reported was normal.  Neurologist diagnosed ADHD; \"Fidgety boy who bounces,\" history of impulsiveness with darting, issues keeping his hands to himself, " "growling behaviors, a worrier who is bothered by noises from the smoke detector or a fire alarm. He insists on things being arranged in a certain way, somewhat fearful above low tolerance and is easily stressed.     MEDICAL REVIEW OF SYSTEMS:  GEN: denies fever, chills, night sweats  HEENT: denies changes in vision, eye pain, hearing changes, no symptoms of upper respiratory infection  CARDIO: denies chest pain and palpitations   RESP: denies shortness of breath  GI: denies nausea/vomiting/constipation/diarrhea, or blood in the stool  : denies burning/frequency/urgency, or bloody urine  NEURO: denies tremor, dizziness, numbness or tingling. Hx infantile seizures  MSK: denies muscle spasms or stiffness. See 'Communication\" below. Sits with torso leaning to left, difficult for him to sit straight & when head is looking at this writer, left ear tilted towards his left shoulder. Asked ct to stand: Left shoulder approx 3 inches higher than right. Lumbar Lordosis noted.  Suspect Structural scoliosis  DERM: denies new onset of rash     Med SE: none noted or reported     COMPLIANCE: good     MMS:  ORIENTATION   alert  ambulatory  cooperative   dysmorphic features     BEHAVIOR:  enters easily  eye contact normal  gait normal  reciprocal interaction  spontaneous speech     MEMORY:  poor remote  poor recent     SENSORY :  Bifocal glasses     COMMUNICATION:  conversational  speech dysarthria- very difficult to understand. Per chart: nasal talk. Improved with Risperdal DC.   Facial asymmetrical. Entire left side of face lower than right. Question if part of speech issue.   Staff state stroke has been ruled out.     AFFECT:  normal/full     MOOD: anxious      THOUGHT PROCESS:  concrete   perseverative      THOUGHT Content:  obsessive     CONCENTRATION  Easily distracted     FUND OF KNOWLEDGE :  mod disability- fx at approx age 8 yo     JUDGEMENT: posed situations     EPS: none noted or reported.   AIMS negative 9/6/2024   "   LABS REVIEWED:  July 2024 VPA 25.8 micrograms/mL ()  April 2024  WBC 2.9  4.8 - 10.8 K/uL   Dec 2023   Valproic Acid Lvl 36.7 ug/mL  50.0 - 100.0   Lamotrigine Lvl 9.6 ug/mL  3.0 - 15.0 ug/mL   May 2023 in chart  November 2022 prolactin 56 NG/mL (4â€“15)  Oct 2020:  TSH, Thyroid, T4, prolactin & Cortisol- WNL  October 2019:  CBC/Diff: WBC 3.7; RBC 4.35  TSH, Lipid, & Vitamin D (58)- WNL  CMP: Alk phos 106; creatinine 0.57, Globulin 2.2      Feb 2019: reviewed. WBC, RBC, H/H were low at that time.     EKG = sees cardiologist  9/18/2024 prior to Nuedexta start:  82 bpm  QTc 420 MS  Normal sinus rhythm  Rightward axis  Otherwise normal ECG  When compared with ECG of 04-JUL-2022 21:07,  No significant change was found    July 2022   Normal sinus rhythm  Right axis deviation  Incomplete right bundle branch block  Abnormal ECG  74 bpm  No previous ECGs available    Qtc 412 ms  May 2021  73 bpm  QTc 412  May 2019:   85 bpm  QTc 435

## 2024-10-25 NOTE — PATIENT INSTRUCTIONS
ASSESSMENT: Mr. Gonzalez presents second day on Nuedexta.  Difficult time off of risperidone.  withdrawal tremor noted during assessment and reported starting as soon as he discontinued risperidone.  Even though he is already taking quinidine (part of Nuedexta) along with carvedilol, 10-day prescription of propranolol given.  If he appears oversedated or at risk for falls, discontinue propranolol immediately.  See treatment plan below.     Pharmacogenomics Testing (PGT) June 2021 results reviewed:  Normal Folic Acid Conversion  Use As Directed: Desvenlafaxine (Pristiq), Buspirone (BuSpar), & sertraline (Zoloft) and Divalproex Sodium (Depakote).   Moderate gene to drug interaction: Risperdal with the clinical consideration that serum level may be too low and higher doses may be required. (Extensive metabolizers). He was previously on 5 mg daily and was not effective. HX prolactinemia November 2022: 56ng/mL (therapeutic range 4-15). Reports asymptomatic. Unable to obtain risperidone serum level to see how he metabolizes this medication. History of leukopenia and neutropenia. April 2024 WBC 2.9 (range should be 4.8-10.8); ANC 1300 (should be greater than 1500).      PLAN:                                                                                                                      problems treated   f/u requested to prevent relapse   medications renewed/re-ordered     Using quiNIDine and carvedilol together can lower your blood pressure and slow your heart rate. This can cause a slow heartbeat, headaches, dizziness, or feeling like you might pass out.     Request for cardiac clearance sent to cardiologist: Dr. Amara Bean  Ejection fraction in 2020: 25-30%.  Should be 50-70%.  Start propranolol 5 mg by mouth twice a day for 10 days, then discontinue for tremors related to discontinuation of risperidone.  Increase to BID now: dextromethorphan- quinidine (Nuedexta) 20-10 mg by mouth twice daily for  pseudobulbar affect.  9/6/24 CNS-LS (Center for Neurologic Study- Lability Scale) score 28(13 required for PBA tx)  A) re- challenge RX for EKG given. To be completed 24-48 hours after starting Nuedexta.  3. Continue desvenlafaxine  50 mg by mouth every morning for anxiety.  4. Continue Divalproex Sodium (Depakote) 500 mg by mouth at HS for moods.   5. Continue BuSpar 5 mg by mouth q AM and 5 mg by mouth at 3 PM for ROBIN.  6. Risks, benefits, alternatives, off-label uses, and side effects of medications have been discussed with patient/caregiver. There is no report of signs/symptoms consistent with medication-induced impairment in daily functioning. At this time, benefits of medication felt to outweigh potential risks. Will continue to reassess need for psychotropic medication at regular  intervals.      7. Return to clinic January 16, 2025 at 8 AM virtual or earlier if needed. Call [phone] to reschedule.     Thank you for seeing me today. If you have any questions or concerns, do not hesitate to contact my office.  Tamanna Romano    TREATMENT TYPE         counseling and coordination of care; addressing signs and symptoms of illness; risks/benefits and side effects of medications; and behavioral approaches to illness.  This note was created using electronic dictation. There may be errors in syntax and meaning. Please contact the office with any questions.

## 2024-10-28 ENCOUNTER — HOSPITAL ENCOUNTER (OUTPATIENT)
Dept: CARDIOLOGY | Facility: HOSPITAL | Age: 29
Discharge: HOME | End: 2024-10-28
Payer: MEDICAID

## 2024-10-28 ENCOUNTER — LAB (OUTPATIENT)
Dept: LAB | Facility: LAB | Age: 29
End: 2024-10-28
Payer: MEDICAID

## 2024-10-28 DIAGNOSIS — Z79.899 ENCOUNTER FOR LONG-TERM (CURRENT) USE OF HIGH-RISK MEDICATION: ICD-10-CM

## 2024-10-28 DIAGNOSIS — Z79.899 ENCOUNTER FOR LONG-TERM (CURRENT) USE OF MEDICATIONS: ICD-10-CM

## 2024-10-28 PROCEDURE — 93010 ELECTROCARDIOGRAM REPORT: CPT | Performed by: INTERNAL MEDICINE

## 2024-10-28 PROCEDURE — 80165 DIPROPYLACETIC ACID FREE: CPT

## 2024-10-28 PROCEDURE — 36415 COLL VENOUS BLD VENIPUNCTURE: CPT

## 2024-10-28 PROCEDURE — 93005 ELECTROCARDIOGRAM TRACING: CPT

## 2024-10-29 ENCOUNTER — APPOINTMENT (OUTPATIENT)
Dept: BEHAVIORAL HEALTH | Facility: CLINIC | Age: 29
End: 2024-10-29
Payer: MEDICAID

## 2024-10-29 DIAGNOSIS — F41.9 ANXIETY: ICD-10-CM

## 2024-10-29 DIAGNOSIS — F91.9 DISRUPTIVE BEHAVIOR DISORDER: ICD-10-CM

## 2024-10-29 LAB
ATRIAL RATE: 93 BPM
P AXIS: 69 DEGREES
P OFFSET: 189 MS
P ONSET: 140 MS
PR INTERVAL: 144 MS
Q ONSET: 212 MS
QRS COUNT: 15 BEATS
QRS DURATION: 92 MS
QT INTERVAL: 344 MS
QTC CALCULATION(BAZETT): 427 MS
QTC FREDERICIA: 398 MS
R AXIS: 106 DEGREES
T AXIS: 40 DEGREES
T OFFSET: 384 MS
VENTRICULAR RATE: 93 BPM

## 2024-10-29 PROCEDURE — 90834 PSYTX W PT 45 MINUTES: CPT | Performed by: COUNSELOR

## 2024-10-30 LAB
SCAN RESULT: ABNORMAL
VALPROATE FREE SERPL-MCNC: 1.5 UG/ML (ref 4–30)

## 2024-12-05 ENCOUNTER — APPOINTMENT (OUTPATIENT)
Dept: BEHAVIORAL HEALTH | Facility: CLINIC | Age: 29
End: 2024-12-05
Payer: MEDICAID

## 2024-12-05 DIAGNOSIS — F91.9 DISRUPTIVE BEHAVIOR DISORDER: ICD-10-CM

## 2024-12-05 DIAGNOSIS — F41.9 ANXIETY: ICD-10-CM

## 2024-12-05 PROCEDURE — 90832 PSYTX W PT 30 MINUTES: CPT | Performed by: COUNSELOR

## 2024-12-05 NOTE — PROGRESS NOTES
"Total Time: 30 min  Diagnosis: anxiety, disruptive BX  Visit Type: epic  Reason for visit: managing his worry and disruptive BX    - started his new job, its been about 2 weeks and he really likes it  - talked some about thanksgiving, got to stay at his moms house  - still going through some med changes, some better some not: better talking more, clearer, but not: still agitated, not as easily redirected, and gets really upset over smaller things (tripped and then began hitting signs, they wouldnt let him the on bus, went on for 30 min)  - talked about his worries, reports he worries pretty much all the time, about everything (money, living on the street, being alone, etc); catching the worried thoughts when they are smaller       Focused on:  - active listening  - coping skills (reported feeling better with cats, trying to get a stuffed animal cat, make a list of 5-10 things to make the worry \"smaller\")  - continue with ways to get along with peers, finding things in common; work his list of 3, adding in walking, water drinking   - processing emotions, emotion identification, regulation, expression    "

## 2025-01-06 ENCOUNTER — APPOINTMENT (OUTPATIENT)
Dept: BEHAVIORAL HEALTH | Facility: CLINIC | Age: 30
End: 2025-01-06
Payer: MEDICAID

## 2025-01-06 DIAGNOSIS — F91.9 DISRUPTIVE BEHAVIOR DISORDER: ICD-10-CM

## 2025-01-06 DIAGNOSIS — F41.9 ANXIETY: ICD-10-CM

## 2025-01-06 PROCEDURE — 90832 PSYTX W PT 30 MINUTES: CPT | Performed by: COUNSELOR

## 2025-01-06 NOTE — PROGRESS NOTES
"Total Time: 24 min  Diagnosis: anxiety, disruptive BX  Visit Type: epic  Reason for visit: managing his worry and disruptive BX    - staff note things about the same; continues to \"tick\" a lot, a lot of twitching, still not sure about his meds  - he notes he feels \"ok\" but annoyed by peers; staff note it seems more like a habit than actual agitation  - talked about Less verbal cues, more visual, flat affect with responding to his threats     Focused on:  - active listening  - coping skills   - continue with ways to get along with peers, trying to work on a 3 line morning mantra to be reassured about others actions; less verbal queues and more visual  - processing emotions, emotion identification, regulation, expression    "

## 2025-01-08 DIAGNOSIS — R26.9 ABNORMALITY OF GAIT AND MOBILITY: Primary | ICD-10-CM

## 2025-01-08 DIAGNOSIS — I50.22 CHRONIC SYSTOLIC (CONGESTIVE) HEART FAILURE: ICD-10-CM

## 2025-01-16 ENCOUNTER — APPOINTMENT (OUTPATIENT)
Dept: BEHAVIORAL HEALTH | Facility: CLINIC | Age: 30
End: 2025-01-16
Payer: MEDICAID

## 2025-01-16 VITALS — RESPIRATION RATE: 17 BRPM | HEART RATE: 98 BPM | SYSTOLIC BLOOD PRESSURE: 93 MMHG | DIASTOLIC BLOOD PRESSURE: 47 MMHG

## 2025-01-16 DIAGNOSIS — F48.2 PSEUDOBULBAR AFFECT: ICD-10-CM

## 2025-01-16 DIAGNOSIS — F41.9 ANXIETY: Primary | ICD-10-CM

## 2025-01-16 DIAGNOSIS — F48.2 PBA (PSEUDOBULBAR AFFECT): ICD-10-CM

## 2025-01-16 DIAGNOSIS — Z72.89 SELF-INJURIOUS BEHAVIOR: ICD-10-CM

## 2025-01-16 DIAGNOSIS — F91.9 DISRUPTIVE BEHAVIOR DISORDER: ICD-10-CM

## 2025-01-16 PROCEDURE — 1036F TOBACCO NON-USER: CPT | Performed by: NURSE PRACTITIONER

## 2025-01-16 PROCEDURE — 99215 OFFICE O/P EST HI 40 MIN: CPT | Performed by: NURSE PRACTITIONER

## 2025-01-16 RX ORDER — DESVENLAFAXINE 100 MG/1
100 TABLET, EXTENDED RELEASE ORAL DAILY
Qty: 30 TABLET | Refills: 3 | Status: SHIPPED | OUTPATIENT
Start: 2025-01-16

## 2025-01-16 RX ORDER — BUSPIRONE HYDROCHLORIDE 5 MG/1
TABLET ORAL
Qty: 60 TABLET | Refills: 3 | Status: SHIPPED | OUTPATIENT
Start: 2025-01-16

## 2025-01-16 RX ORDER — DIVALPROEX SODIUM 500 MG/1
500 TABLET, FILM COATED, EXTENDED RELEASE ORAL NIGHTLY
Qty: 30 TABLET | Refills: 3 | Status: SHIPPED | OUTPATIENT
Start: 2025-01-16 | End: 2025-05-16

## 2025-01-16 NOTE — PROGRESS NOTES
ASSESSMENT: Mr. Gonzalez presents impulsive, decreased motivation, unable to sit still, decreased focus, increased invasiveness. Not candidate for ADHD stimulant dt past report caused emotional liability and taking daily Boost for weight.  He is described as always in a low mood.  Caregivers report no significant benefits noted with divalproex.    See treatment plan below.     Pharmacogenomics Testing (PGT) June 2021 results reviewed:  Normal Folic Acid Conversion  Use As Directed: Desvenlafaxine (Pristiq), Buspirone (BuSpar), & sertraline (Zoloft) and Divalproex Sodium (Depakote).   Moderate gene to drug interaction: Risperdal with the clinical consideration that serum level may be too low and higher doses may be required. (Extensive metabolizers). He was previously on 5 mg daily and was not effective. HX prolactinemia November 2022: 56ng/mL (therapeutic range 4-15). Reports asymptomatic. Unable to obtain risperidone serum level to see how he metabolizes this medication. History of leukopenia and neutropenia. April 2024 WBC 2.9 (range should be 4.8-10.8); ANC 1300 (should be greater than 1500).      PLAN:                                                                                                     problems treated   f/u requested to prevent relapse   medications renewed/re-ordered    Increase desvenlafaxine 50 mg by mouth every morning for anxiety.  Continue dextromethorphan- quinidine (Nuedexta) 20-10 mg by mouth twice daily for pseudobulbar affect.  9/6/24 CNS-LS (Center for Neurologic Study- Lability Scale) score 28(13 required for PBA tx)  3. Continue BuSpar 5 mg by mouth q AM and 5 mg by mouth at 3 PM for ROBIN.  4. Continue Divalproex Sodium (Depakote) 500 mg by mouth at HS for moods. Started Dec 2023. Staff report no real difference.  5. Risks, benefits, alternatives, off-label uses, and side effects of medications have been discussed with patient/caregiver. There is no report of signs/symptoms consistent  "with medication-induced impairment in daily functioning. At this time, benefits of medication felt to outweigh potential risks. Will continue to reassess need for psychotropic medication at regular  intervals.  6. Return to clinic 1 month virtual or earlier if needed. Call [phone] to reschedule.     Thank you for seeing me today. If you have any questions or concerns, do not hesitate to contact my office.  Tamanna Romano    TREATMENT TYPE         counseling and coordination of care; addressing signs and symptoms of illness; risks/benefits and side effects of medications; and behavioral approaches to illness.  This note was created using electronic dictation. There may be errors in syntax and meaning. Please contact the office with any questions.  For [facility] residents, [employer] is a 24/7 hotline you can call for assistance [[phone]].   Please call 911 or go to your closest Emergency Room if you feel worse. This includes thoughts of hurting yourself or anyone else, or having other troubles such as hearing voices, seeing visions, or having new and scary thoughts about the people around you.       PRESENT FOR APPOINTMENT  Client   NAHID Gongora, known 1.5 yrs  Ai Hodgson  Not present: Isabelle Marquez, mother/Guardian  Virtual or Telephone Consent    An interactive audio and video telecommunication system which permits real time communications between the patient (at the originating site) and provider (at the distant site) was utilized to provide this telehealth service.   Verbal consent was requested and obtained from Kehinde Gonzalez on this date, 01/16/25 for a telehealth visit.       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      Last seen October 2024.  At that time, Nuedexta was increased and propranolol 10-day prescription was given for " withdrawal tremors.  September 2024.  At that time, risperidone and dextromethorphan-guaifenesin were discontinued. dextromethorphan- quinidine (Nuedexta) was started.  April 2024.  At that time, no medication changes.  February 2024.  At that time, sertraline was discontinued due to ineffective and low serum levels at maximum dose.  November 2023. At that time, Depakote was started.   October 2023.  At that time, no medication changes due to pending lab results.  August 2023. At that time, no medication changes.  July 2023. At that time, venlafaxine was decreased.  June 26, 2023. At that time, desvenlafaxine was increased and timing on BuSpar was adjusted (from 12 hours to 8 hours).  June 1, 2023. At that time, no medication changes. On desvenlafaxine HCL ER for 11 days and was his last day on Clonazepam.   May 2023. At that time, desvenlafaxine HCL ER was started.   March 2023. At that time, clonazepam was decreased due to possible disinhibition.  December 2022. At that time, no medication changes.  November 2022. At that time, BuSpar was started.  October 2022. At that time, no medication changes.  July 2022. At that time, Risperdal was restarted per request for skin excoriation, Ziprasidone and Colace were discontinued.  May 2022. At that time, sertraline (Zoloft) was increased.  April 2022: sertraline was increased.   February 23, 2022. At that time, Sertraline was increased.  February 1, 2022. At that time, NAC was DC'd, Ziprasidone (Geodon) & Clonazepam were increased.  January 5, 2022. At that time, Geodon (Ziprasidone) was started   November 2021. At that time, Lurasidone (Latuda) was increased.  September 2021. At that time, Abilify was DC'd (not effective) and Latuda was started (chosen dt new cardiac concerns).  August 2021. At that time, sertraline (ZoloftÂ®) was increased.   June 2021. At that time, fluoxetine (Prozac) was DC'd & sertraline (ZoloftÂ®) was started.   May 2021. At that time, Gareth was  "increased.  April 2021. At that time, no medication changes.  February 2021. At that time, no medication changes.  January 28, 2021. At that time, no medication changes.  January 5, 2021. At that time, Klonopin was decreased, Abilify & Colace were started.  November 2020. At that time, NAC was increased.   October 2020. At that time, Klonopin was decreased and Fluoxetine was increased.   August 2020. At that time, no medication changes.  July 2020. At that time, NAC was increased.  May 2020. At that time, no medication changes.  January 2020. At that time, no medication changes.  November 2019. At that time, NAC was increased. Jan 2020, Risperdal was DC'd.   September. At that time, Risperdal was increased and NAC was started.   Aug 2019. At that time, fluoxetine was increased and Risperdal was decreased.  June 2019. At that time, fluoxetine was increased and Risperdal was decreased.  April 2019. At that time, no med changes.     Cheko reports that he is \"good\".  No longer in Day Program dt not attending and refusing activities. He prefers to be on his iPad.  On waiting list to go to [facility]. Increased frustration and anger focused at peers.  Agenesis of the corpus callosum  PHQ-9 score 5 on 4/30/24    With Neudexta:   specifically identifying a day that he will \"kill\" or \"shoot\" himself.  For example, I am going to shoot myself in 10 years, as compared to Friday.  No longer SIB, but no change in skin excoriation.  Staff report he always appears negative.  He can become in a rage at any moment, readily on edge,  never really appears aggressive towards staff.  No longer having crying episodes.  Behaviors diff to redirect and lasting longer, up to all day. Decreased emotion outbursts, increased angry outbursts. Property dest, glasses x 2, shirts, slamming iPad.  Weird noises, unable to sit still, face movements. Time to eat, Rapid, needs to slow, becomes upset/disruptive.  Speech clearer.  Staff reports that his " "behaviors are incongruent with what is going on and are out of proportion.    Hx SIB with injury: scratches on face, hits his head, and will pull off his own finger/toe nails if bothering him.  All day \"Bullying\" of peer that is lower functioning (not able to defend self against Cheko).  Staff report SIB 45 min of pounding on head in frustration after peer involvement, also hitting self more.   No de-escalation would work. Hitting dents into the wall.   Repeats kill myself verbals.     He visits his Mom and goes to [employer]. He enjoys going there, especially for the cats. One Calico cat, Maribell, sleeps on his chest.  5/20/23 started taking venlafaxine HCL ER. Significant change in behaviors: worse: hitting self and others more. Threatening to harm and kill himself more. Increased behaviors surrounding wanting to eat more. Describes as increased obsessions with food. Increased SIB: scratching (scratches on face) & hitting self (sores on back of head). Property destruction: broke his iPad. Closer to hitting others. Increased frustration level with longer, more difficult periods to calm down. Redirection no longer works. IE: shoe fell off; took 30+ minutes to calm.  \"Angry with self constantly\"  Seriousness of targeting peers with more cognitive issues, communication concerns and those with WC or walkers. Has never gone away.      Cheko reports that his sleep \"on and off.\" With help from staff, sleeping through the night, with 1 awakening to use RR and back to sleep.  If not busy, he will lay in recliner and sleep.   He has been staying busy. Doing extra work.  [facility]- frustration with losing sight. Requiring holding onto staff's arm in unfamiliar places. [place] more hope after apt. Adaptive piece to magnify iPad.   Deficits causing increase in depression. Resorts to saying he is going to kill himself at any frustration.     Ripped off right middle toenail.  Targets 4 peers in the home, Drastic shift in " moods from irritable to laughing.     4/29/23 [facility] in [place] for bronchitis  PHQ 9 on 5/4/23 score 15 moderately severe  ROBIN 7 on 5/4/23 score 16 severe anxiety     his grandma passed away November 2022. It's been hard, but he looks at FB pictures and distracts by talking to staff and working.     7/4/22 SIB violent hitting head, threatening to kill self & others, 911 called and squadded to  ER.      Continues with cardiac services. On 3 heart medications  Appetite decreased when anxious     Infatuation with others dirty attends w/sexual arousal  Increased urinary incontinence; especially when frustrated & in the night     Resides: [facility] since October 5, 2021. His home has 7 same-age male peers. Shares room w/1 male peer. Caregiver reports that he still remains angry 90% of the time. Irritability with threatening, punching walls, kicking items, and waving his fists/arm swings.     He utilizes headphones, which appear to be noise canceling. Tries to use his iPad, but having difficulty.  SIB. Pounds the sided of his temples, punch and kick the door, only when he does something he feels wrong. IE: drops something or gets something wrong.  If he runs into the wall, he will want to punch the wall.  Towards others: will raise his arm like he is going to punch them.     MEDICATIONS: Past: Ritalin- effect unknown.  Adderall, Strattera and Concerta caused emotional changes moodiness.  Sertraline (Zoloft) appeared to be ineffective.  With the start of BuSpar, staff note improvement in fighting with others, anger, verbal threats towards others and self. Continues to have irritability towards same peer. Staff report skin excoriation improved. No open areas: fingers, back of head, nose and in ear.   Abilify- minimal improvement in irritably  ziprasidone (Geodon)- had not yet been maximized to its full potential. Declined increase. Med education provided that this was not a medication failure. May need  "to consider using again in the future.  Psy/SI/HI/aggression: Denies A/VH, paranoia. Denies SI/HI  Continuous talking appears to be ongoing since prior to age 9.  SIB: easily frustrated. skin excoriation on fingers.      Appetite: good. 2016. Dx dysphagia velopharyngeal insufficiency. Seen & evaluation by otolaryngic Dr. New Mahmood. No concerns noted & recommended speech therapy. Chopped tough meats.   Daily schedule: [employer], since September 2019. He comes home happy.      History of 2-infantile surgeries:Hypospadius & inguinal hernia and hydrocele for repair.  Med Physicians:  PCP:Dr. Smith: routine & PRN visits.   Endocrinology: Dr. Castano from [facility].   Genetics: Dr. Marlen Rogers. Denied DX of Marfan Syndrome & Ruiz- Fryns Syndrome  Cardiologist: Started carvedilol for Left ventricular dysfunction.  Evaluated by Dr. Amara Bean, on 5/13/21  Dentist: Routine.   Hx of excessive saliva. Most likely dt keeping his head down, chin on chest. With the DC of Risperdal, Cheko reports that he no longer bites the inside of his cheek. He also seems to be able to enunciate better.   Eye: Wears bifocals   Neurologist: last seen in 2016. Appears primary care doctor prescribes Lamictal for epilepsy. Unknown last seizure. Per old chart, seizures started at approximately 8 years of age. Has upward eye rolling with facial twitching and fist clenching. EEG at that time, mother reported was normal.  Neurologist diagnosed ADHD; \"Fidgety boy who bounces,\" history of impulsiveness with darting, issues keeping his hands to himself, growling behaviors, a worrier who is bothered by noises from the smoke detector or a fire alarm. He insists on things being arranged in a certain way, somewhat fearful above low tolerance and is easily stressed.     MEDICAL REVIEW OF SYSTEMS:  GEN: denies fever, chills, night sweats  HEENT: denies changes in vision, eye pain, hearing changes, no symptoms of upper respiratory infection  CARDIO: " "denies chest pain and palpitations   RESP: denies shortness of breath  GI: denies nausea/vomiting/constipation/diarrhea, or blood in the stool  : denies burning/frequency/urgency, or bloody urine  NEURO: denies tremor, dizziness, numbness or tingling. Hx infantile seizures  MSK: denies muscle spasms or stiffness. See 'Communication\" below. Sits with torso leaning to left, difficult for him to sit straight & when head is looking at this writer, left ear tilted towards his left shoulder. Asked ct to stand: Left shoulder approx 3 inches higher than right. Lumbar Lordosis noted.  Suspect Structural scoliosis  DERM: denies new onset of rash     Med SE: none noted or reported     COMPLIANCE: good     MMS:  ORIENTATION   alert  ambulatory  cooperative   dysmorphic features     BEHAVIOR:  enters easily  eye contact normal  gait normal  reciprocal interaction  spontaneous speech     MEMORY:  poor remote  poor recent     SENSORY :  Bifocal glasses     COMMUNICATION:  conversational  speech dysarthria- very difficult to understand. Per chart: nasal talk. Improved with Risperdal DC.   Facial asymmetrical. Entire left side of face lower than right. Question if part of speech issue.   Staff state stroke has been ruled out.     AFFECT:  normal/full     MOOD: anxious      THOUGHT PROCESS:  concrete   perseverative      THOUGHT Content:  obsessive     CONCENTRATION  Easily distracted     FUND OF KNOWLEDGE :  mod disability- fx at approx age 8 yo     JUDGEMENT: posed situations     EPS: none noted or reported.   AIMS negative 9/6/2024     LABS REVIEWED:  July 2024 VPA 25.8 micrograms/mL ()  April 2024  WBC 2.9  4.8 - 10.8 K/uL   Dec 2023   Valproic Acid Lvl 36.7 ug/mL  50.0 - 100.0   Lamotrigine Lvl 9.6 ug/mL  3.0 - 15.0 ug/mL   May 2023 in chart  November 2022 prolactin 56 NG/mL (4â€“15)  Oct 2020:  TSH, Thyroid, T4, prolactin & Cortisol- WNL  October 2019:  CBC/Diff: WBC 3.7; RBC 4.35  TSH, Lipid, & Vitamin D (58)- " WNL  CMP: Alk phos 106; creatinine 0.57, Globulin 2.2      Feb 2019: reviewed. WBC, RBC, H/H were low at that time.     EKG = sees cardiologist  9/18/2024 prior to Nuedexta start:  82 bpm  QTc 420 MS  Normal sinus rhythm  Rightward axis  Otherwise normal ECG  When compared with ECG of 04-JUL-2022 21:07,  No significant change was found    July 2022   Normal sinus rhythm  Right axis deviation  Incomplete right bundle branch block  Abnormal ECG  74 bpm  No previous ECGs available    Qtc 412 ms  May 2021  73 bpm  QTc 412  May 2019:   85 bpm  QTc 435

## 2025-01-16 NOTE — PATIENT INSTRUCTIONS
ASSESSMENT: Mr. Gonzalez presents impulsive, decreased motivation, unable to sit still, decreased focus, increased invasiveness. Not candidate for ADHD stimulant dt past report caused emotional liability and taking daily Boost for weight.  He is described as always in a low mood.  Caregivers report no significant benefits noted with divalproex.    See treatment plan below.     Pharmacogenomics Testing (PGT) June 2021 results reviewed:  Normal Folic Acid Conversion  Use As Directed: Desvenlafaxine (Pristiq), Buspirone (BuSpar), & sertraline (Zoloft) and Divalproex Sodium (Depakote).   Moderate gene to drug interaction: Risperdal with the clinical consideration that serum level may be too low and higher doses may be required. (Extensive metabolizers). He was previously on 5 mg daily and was not effective. HX prolactinemia November 2022: 56ng/mL (therapeutic range 4-15). Reports asymptomatic. Unable to obtain risperidone serum level to see how he metabolizes this medication. History of leukopenia and neutropenia. April 2024 WBC 2.9 (range should be 4.8-10.8); ANC 1300 (should be greater than 1500).      PLAN:                                                                                                     problems treated   f/u requested to prevent relapse   medications renewed/re-ordered    Increase desvenlafaxine 100 mg (from 50 mg) by mouth every morning for anxiety.  Continue dextromethorphan- quinidine (Nuedexta) 20-10 mg by mouth twice daily for pseudobulbar affect.  9/6/24 CNS-LS (Center for Neurologic Study- Lability Scale) score 28(13 required for PBA tx)  3. Continue BuSpar 5 mg by mouth q AM and 5 mg by mouth at 3 PM for ROBIN.  4. Continue Divalproex Sodium (Depakote) 500 mg by mouth at HS for moods. Started Dec 2023. Staff report no real difference.  5. Risks, benefits, alternatives, off-label uses, and side effects of medications have been discussed with patient/caregiver. There is no report of  signs/symptoms consistent with medication-induced impairment in daily functioning. At this time, benefits of medication felt to outweigh potential risks. Will continue to reassess need for psychotropic medication at regular  intervals.  6. Return to clinic 1 month virtual or earlier if needed. Call [phone] to reschedule.     Thank you for seeing me today. If you have any questions or concerns, do not hesitate to contact my office.  Tamanna Romano    TREATMENT TYPE         counseling and coordination of care; addressing signs and symptoms of illness; risks/benefits and side effects of medications; and behavioral approaches to illness.  This note was created using electronic dictation. There may be errors in syntax and meaning. Please contact the office with any questions.

## 2025-02-07 ENCOUNTER — APPOINTMENT (OUTPATIENT)
Dept: BEHAVIORAL HEALTH | Facility: CLINIC | Age: 30
End: 2025-02-07
Payer: MEDICAID

## 2025-02-07 DIAGNOSIS — F41.9 ANXIETY: ICD-10-CM

## 2025-02-07 DIAGNOSIS — F91.9 DISRUPTIVE BEHAVIOR DISORDER: ICD-10-CM

## 2025-02-07 PROCEDURE — 90832 PSYTX W PT 30 MINUTES: CPT | Performed by: COUNSELOR

## 2025-02-07 NOTE — PROGRESS NOTES
"Total Time: 30 min  Diagnosis: anxiety, disruptive BX  Visit Type: epic  Reason for visit: managing his worry and disruptive BX    - he self reports feeling better but staff report it feels about the same (less aggression but still lots of self talk/hate, twitching)  - when asked about his mantras, he could not remember them, but staff did note they are hanging on his door (be nice, supportive, its going to be a good day); reports feeling he has more staff supports  - reports he is helping with the garbage more; staff report he is \"vocalizing\" more like hooting or meowing   - notes he has an ear ache       Focused on:  - active listening  - coping skills   - continue with ways to get along with peers, trying to work on a 3 line morning mantra to be reassured about others actions; less verbal queues and more visual  - processing emotions, emotion identification, regulation, expression    "

## 2025-02-25 ENCOUNTER — APPOINTMENT (OUTPATIENT)
Dept: BEHAVIORAL HEALTH | Facility: CLINIC | Age: 30
End: 2025-02-25
Payer: MEDICAID

## 2025-02-27 ENCOUNTER — APPOINTMENT (OUTPATIENT)
Dept: BEHAVIORAL HEALTH | Facility: CLINIC | Age: 30
End: 2025-02-27
Payer: MEDICAID

## 2025-02-28 ENCOUNTER — APPOINTMENT (OUTPATIENT)
Dept: BEHAVIORAL HEALTH | Facility: CLINIC | Age: 30
End: 2025-02-28
Payer: MEDICAID

## 2025-03-11 ENCOUNTER — APPOINTMENT (OUTPATIENT)
Dept: CARDIOLOGY | Facility: CLINIC | Age: 30
End: 2025-03-11
Payer: MEDICAID

## 2025-03-14 ENCOUNTER — APPOINTMENT (OUTPATIENT)
Dept: CARDIOLOGY | Facility: CLINIC | Age: 30
End: 2025-03-14
Payer: MEDICAID

## 2025-03-14 VITALS
WEIGHT: 148 LBS | DIASTOLIC BLOOD PRESSURE: 65 MMHG | SYSTOLIC BLOOD PRESSURE: 91 MMHG | HEART RATE: 88 BPM | BODY MASS INDEX: 23.18 KG/M2 | OXYGEN SATURATION: 97 % | TEMPERATURE: 97.6 F

## 2025-03-14 DIAGNOSIS — I42.8 NON-ISCHEMIC CARDIOMYOPATHY (MULTI): Primary | ICD-10-CM

## 2025-03-14 PROCEDURE — 99214 OFFICE O/P EST MOD 30 MIN: CPT | Performed by: INTERNAL MEDICINE

## 2025-03-14 ASSESSMENT — PAIN SCALES - GENERAL: PAINLEVEL_OUTOF10: 0-NO PAIN

## 2025-03-14 NOTE — PROGRESS NOTES
Recent Hospitalizations: None  Accompanied by: Mother    Denies fatigue, chest pain, chest pressure, palpitations, shortness of breath, dyspnea on exertion, orthopnea, PND. No edema noted in BLE.   Denies headaches, dizziness, lightheadedness, and falls.    He denies all cardiac symptoms.

## 2025-03-14 NOTE — PROGRESS NOTES
Heart Failure Cardiology    Kehinde Gonzalez is a 29 y.o. male from [facility], he lives in a group home/facility. He is here today with two caregivers from his facility. They report to me that to their knowledge he has done well. No edema. He has not been complaining of shortness of breath.    He is well appearing today without distress.    Exam: BP 91/65   Pulse 88   Temp 36.4 °C (97.6 °F) (Temporal)   Wt 67.1 kg (148 lb)   SpO2 97%   BMI 23.18 kg/m²   No JVD  RRR  CTA  No LE edema    Current Outpatient Medications   Medication Instructions    acetaminophen (TYLENOL) 650 mg, Every 6 hours PRN    bacitracin zinc-polymyxin B 500-10,000 unit/gram bandage 1 each, 2 times daily PRN    benzonatate (TESSALON) 100 mg, 3 times daily PRN    bisacodyl (Dulcolax) 10 mg suppository 1 suppository, Daily PRN    brompheniramine-pseudoephedrin (Dimetapp) 1-15 mg/5 mL liquid 20 mL, Every 4 hours PRN    busPIRone (Buspar) 5 mg tablet BuSpar 5 mg by mouth q AM and 5 mg by mouth at 3 PM    carvedilol (Coreg) 3.125 mg tablet 1 tablet, 2 times daily    cetirizine (ZyrTEC) 10 mg tablet 1 tablet, Daily    clobetasol (Temovate) 0.05 % cream 2 times daily PRN    cyclobenzaprine (FLEXERIL) 10 mg, 3 times daily PRN    dapagliflozin (Farxiga) 10 mg 1 tablet, Daily    desvenlafaxine (PRISTIQ) 100 mg, oral, Daily, Do not crush, chew, or split.    dextromethorphan-quinidine (NUEDEXTA) 20-10 mg capsule 1 capsule, oral, Every 12 hours    diphenhydramine HCl (BENADRYL ORAL) 25 mg, Every 6 hours PRN    divalproex (DEPAKOTE ER) 500 mg, oral, Nightly, Do not crush, chew, or split.    docusate sodium (Colace) 100 mg capsule 1 capsule, Daily PRN    fluticasone (Flonase) 50 mcg/actuation nasal spray 1 spray    guaiFENesin (Robitussin) 100 mg/5 mL syrup Every 4 hours RT    High Potency Multivitamin 400 mcg tablet Po 1 daily    hypromellose (GENTEAL TEARS SEVERE GEL OPHT) 1 drop, 2 times daily    ibuprofen 600 mg tablet 1 tablet, Every 8 hours PRN     "Lactobacillus acidophilus (PROBIOTIC ACIDOPHILUS ORAL) Take by mouth.    lamoTRIgine (LAMICTAL) 150 mg, 2 times daily    lidocaine-aloe vera (Solarcaine Cool Aloe) 0.5 % spray Apply topically.    loperamide (Imodium) 2 mg capsule Initial: 4 mg, followed by 2 mg PRN after each loose stool for 3 days. Max: 8 mg in 1 day.    magnesium hydroxide (Milk of Magnesia) 400 mg/5 mL suspension Daily PRN    sacubitriL-valsartan (Entresto) 24-26 mg tablet 1 tablet, 2 times daily    sodium phosphates 19-7 gram/197 mL enema 1 enema    spironolactone (ALDACTONE) 12.5 mg, Daily     Past medical history (non-cardiac):  -- History of cognitive/developmental delay  -- History of seizures  -- History of self-injurious behavior     Cardiovascular history:  --Presumed nonischemic cardiomyopathy of unknown cause  --Stage B HFrEF (\"Pre-heart failure\") with no prior signs or symptoms of disease    Assessment: 29 y.o. WM with non-ischemic cardiomyopathy and pre-heart failure (Stage B). He has not progressed to clinical heart failure.     Plan:  -- Stay on same regimen   -- Routine visit in 2 years    Amara Bean MD, MPH  Advanced Heart Failure and Transplant Cardiology  [facility]  [facility]  "

## 2025-03-14 NOTE — PATIENT INSTRUCTIONS
Thank you for coming to see us today! To reach Dr. Bean's office please call [phone]. Fax [phone]. Call [phone] to schedule an appointment. You may also contact the HF RNs at [email or web address]  (Please include your name and date of birth)  For MEDICATION REFILLS, please call [phone] option 6 then option 1.    We will renew your cardiac medications today.   Schedule a follow up with Dr. Bean in two years

## 2025-03-17 ENCOUNTER — APPOINTMENT (OUTPATIENT)
Dept: BEHAVIORAL HEALTH | Facility: CLINIC | Age: 30
End: 2025-03-17
Payer: MEDICAID

## 2025-03-17 DIAGNOSIS — F91.9 DISRUPTIVE BEHAVIOR DISORDER: ICD-10-CM

## 2025-03-17 DIAGNOSIS — F41.9 ANXIETY: ICD-10-CM

## 2025-03-17 PROCEDURE — 90834 PSYTX W PT 45 MINUTES: CPT | Performed by: COUNSELOR

## 2025-03-17 RX ORDER — CARVEDILOL 3.12 MG/1
3.12 TABLET ORAL 2 TIMES DAILY
Qty: 180 TABLET | Refills: 6 | Status: SHIPPED | OUTPATIENT
Start: 2025-03-17

## 2025-03-17 RX ORDER — SPIRONOLACTONE 25 MG/1
12.5 TABLET ORAL DAILY
Qty: 45 TABLET | Refills: 6 | Status: SHIPPED | OUTPATIENT
Start: 2025-03-17 | End: 2026-12-07

## 2025-03-17 RX ORDER — DAPAGLIFLOZIN 10 MG/1
10 TABLET, FILM COATED ORAL DAILY
Qty: 90 TABLET | Refills: 6 | Status: SHIPPED | OUTPATIENT
Start: 2025-03-17

## 2025-03-17 NOTE — PROGRESS NOTES
"Total Time: 39 min  Diagnosis: anxiety, disruptive BX  Visit Type: epic  Reason for visit: managing his worry and disruptive BX    - Really upset at the start, peer in office, screaming, hitting self  - staff noted Peer/peer on bus; he got his and bit, then he hit back  - Night to shine, Nice weather/Garden  - talked about his upcoming Bday and Mom  - notes he is Feeling good, “ok” listening to staff  - Trying to get better with meowing, knows hes doing it  - today at work they did Cookie decorating and karaoke  - Mantras (don’t always have time); talked about a Distraction/topic board       Focused on:  - active listening  - coping skills (distraction and deep breathing in session)  - continue with ways to get along with peers, trying to work on a 3 line morning mantra to be reassured about others actions; less verbal queues and more visual, add in a distraction board and on the other side \"instead of hitting: XXX\"  - processing emotions, emotion identification, regulation, expression    "

## 2025-04-07 ENCOUNTER — TELEPHONE (OUTPATIENT)
Dept: PRIMARY CARE | Facility: CLINIC | Age: 30
End: 2025-04-07
Payer: MEDICAID

## 2025-04-07 DIAGNOSIS — R79.89 ELEVATED PROLACTIN LEVEL: ICD-10-CM

## 2025-04-07 DIAGNOSIS — Z00.00 ROUTINE GENERAL MEDICAL EXAMINATION AT A HEALTH CARE FACILITY: ICD-10-CM

## 2025-04-07 DIAGNOSIS — E55.9 VITAMIN D DEFICIENCY: ICD-10-CM

## 2025-04-07 DIAGNOSIS — D64.9 ANEMIA, UNSPECIFIED TYPE: ICD-10-CM

## 2025-04-07 NOTE — TELEPHONE ENCOUNTER
TW PT  Vinh from [facility] called and states that the labs drawn for pt had to be rejected due to lab person misspelling last name, pt labs need to be redone.Please enter labs and call pt to inform.

## 2025-04-11 ENCOUNTER — APPOINTMENT (OUTPATIENT)
Dept: PRIMARY CARE | Facility: CLINIC | Age: 30
End: 2025-04-11
Payer: MEDICAID

## 2025-04-14 LAB — MAGNESIUM SERPL-MCNC: 2.5 MG/DL (ref 1.5–2.5)

## 2025-04-15 LAB
25(OH)D3+25(OH)D2 SERPL-MCNC: 67 NG/ML (ref 30–100)
ALBUMIN SERPL-MCNC: 4.8 G/DL (ref 3.6–5.1)
ALP SERPL-CCNC: 88 U/L (ref 36–130)
ALT SERPL-CCNC: 38 U/L (ref 9–46)
ANION GAP SERPL CALCULATED.4IONS-SCNC: 8 MMOL/L (CALC) (ref 7–17)
AST SERPL-CCNC: 22 U/L (ref 10–40)
BASOPHILS # BLD AUTO: 41 CELLS/UL (ref 0–200)
BASOPHILS NFR BLD AUTO: 1.2 %
BILIRUB SERPL-MCNC: 0.5 MG/DL (ref 0.2–1.2)
BUN SERPL-MCNC: 22 MG/DL (ref 7–25)
CALCIUM SERPL-MCNC: 9.7 MG/DL (ref 8.6–10.3)
CHLORIDE SERPL-SCNC: 100 MMOL/L (ref 98–110)
CHOLEST SERPL-MCNC: 112 MG/DL
CHOLEST/HDLC SERPL: 1.5 (CALC)
CO2 SERPL-SCNC: 32 MMOL/L (ref 20–32)
CREAT SERPL-MCNC: 0.74 MG/DL (ref 0.6–1.26)
EGFRCR SERPLBLD CKD-EPI 2021: 125 ML/MIN/1.73M2
EOSINOPHIL # BLD AUTO: 71 CELLS/UL (ref 15–500)
EOSINOPHIL NFR BLD AUTO: 2.1 %
ERYTHROCYTE [DISTWIDTH] IN BLOOD BY AUTOMATED COUNT: 13.2 % (ref 11–15)
GLUCOSE SERPL-MCNC: 107 MG/DL (ref 65–139)
HCT VFR BLD AUTO: 46.9 % (ref 38.5–50)
HDLC SERPL-MCNC: 77 MG/DL
HGB BLD-MCNC: 16 G/DL (ref 13.2–17.1)
LDLC SERPL CALC-MCNC: 26 MG/DL (CALC)
LYMPHOCYTES # BLD AUTO: 1136 CELLS/UL (ref 850–3900)
LYMPHOCYTES NFR BLD AUTO: 33.4 %
MCH RBC QN AUTO: 31.4 PG (ref 27–33)
MCHC RBC AUTO-ENTMCNC: 34.1 G/DL (ref 32–36)
MCV RBC AUTO: 92.1 FL (ref 80–100)
MONOCYTES # BLD AUTO: 371 CELLS/UL (ref 200–950)
MONOCYTES NFR BLD AUTO: 10.9 %
NEUTROPHILS # BLD AUTO: 1782 CELLS/UL (ref 1500–7800)
NEUTROPHILS NFR BLD AUTO: 52.4 %
NONHDLC SERPL-MCNC: 35 MG/DL (CALC)
PLATELET # BLD AUTO: 238 THOUSAND/UL (ref 140–400)
PMV BLD REES-ECKER: 9.8 FL (ref 7.5–12.5)
POTASSIUM SERPL-SCNC: 4 MMOL/L (ref 3.5–5.3)
PROLACTIN SERPL-MCNC: 6.2 NG/ML (ref 2–18)
PROT SERPL-MCNC: 7 G/DL (ref 6.1–8.1)
RBC # BLD AUTO: 5.09 MILLION/UL (ref 4.2–5.8)
SODIUM SERPL-SCNC: 140 MMOL/L (ref 135–146)
TRIGL SERPL-MCNC: 31 MG/DL
TSH SERPL-ACNC: 1.97 MIU/L (ref 0.4–4.5)
WBC # BLD AUTO: 3.4 THOUSAND/UL (ref 3.8–10.8)

## 2025-04-18 ENCOUNTER — APPOINTMENT (OUTPATIENT)
Dept: PRIMARY CARE | Facility: CLINIC | Age: 30
End: 2025-04-18
Payer: MEDICAID

## 2025-04-18 VITALS
HEIGHT: 67 IN | OXYGEN SATURATION: 97 % | DIASTOLIC BLOOD PRESSURE: 65 MMHG | HEART RATE: 111 BPM | WEIGHT: 133.5 LBS | RESPIRATION RATE: 17 BRPM | SYSTOLIC BLOOD PRESSURE: 106 MMHG | TEMPERATURE: 95.9 F | BODY MASS INDEX: 20.95 KG/M2

## 2025-04-18 DIAGNOSIS — Z00.00 ROUTINE GENERAL MEDICAL EXAMINATION AT A HEALTH CARE FACILITY: Primary | ICD-10-CM

## 2025-04-18 DIAGNOSIS — I50.22 CHRONIC SYSTOLIC (CONGESTIVE) HEART FAILURE: ICD-10-CM

## 2025-04-18 DIAGNOSIS — Q04.0 AGENESIS OF CORPUS CALLOSUM (MULTI): ICD-10-CM

## 2025-04-18 DIAGNOSIS — G40.409 OTHER GENERALIZED EPILEPSY AND EPILEPTIC SYNDROMES, NOT INTRACTABLE, WITHOUT STATUS EPILEPTICUS (MULTI): ICD-10-CM
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
## 2025-04-18 PROBLEM — D64.9 ANEMIA: Status: RESOLVED | Noted: 2024-03-28 | Resolved: 2025-04-18

## 2025-04-18 PROBLEM — Z72.89 SELF-INJURIOUS BEHAVIOR: Status: ACTIVE | Noted: 2022-07-05

## 2025-04-18 PROBLEM — L30.9 DERMATITIS, UNSPECIFIED: Status: RESOLVED | Noted: 2022-06-21 | Resolved: 2025-04-18

## 2025-04-18 PROBLEM — E23.7: Status: RESOLVED | Noted: 2023-10-09 | Resolved: 2025-04-18

## 2025-04-18 PROBLEM — R79.89 INCREASED PROLACTIN LEVEL: Status: RESOLVED | Noted: 2021-02-17 | Resolved: 2025-04-18

## 2025-04-18 PROBLEM — R79.89 ELEVATED PROLACTIN LEVEL: Status: RESOLVED | Noted: 2023-10-09 | Resolved: 2025-04-18

## 2025-04-18 PROBLEM — R13.10 DYSPHAGIA: Status: RESOLVED | Noted: 2021-02-17 | Resolved: 2025-04-18

## 2025-04-18 PROBLEM — E23.7 DISEASE OF PITUITARY GLAND (MULTI): Status: RESOLVED | Noted: 2021-02-17 | Resolved: 2025-04-18

## 2025-04-18 PROBLEM — D35.2 PROLACTINOMA (MULTI): Status: RESOLVED | Noted: 2023-09-07 | Resolved: 2025-04-18

## 2025-04-18 PROBLEM — I51.9 LEFT VENTRICULAR DYSFUNCTION: Status: RESOLVED | Noted: 2022-02-18 | Resolved: 2025-04-18

## 2025-04-18 PROBLEM — F69 BEHAVIOR CONCERN IN ADULT: Status: RESOLVED | Noted: 2022-08-31 | Resolved: 2025-04-18

## 2025-04-18 PROCEDURE — 3008F BODY MASS INDEX DOCD: CPT | Performed by: FAMILY MEDICINE

## 2025-04-18 PROCEDURE — 99395 PREV VISIT EST AGE 18-39: CPT | Performed by: FAMILY MEDICINE

## 2025-04-18 PROCEDURE — 1036F TOBACCO NON-USER: CPT | Performed by: FAMILY MEDICINE

## 2025-04-18 ASSESSMENT — ENCOUNTER SYMPTOMS
DIZZINESS: 0
NAUSEA: 0
ARTHRALGIAS: 0
FATIGUE: 0
VOICE CHANGE: 0
NUMBNESS: 0
EYE DISCHARGE: 0
SHORTNESS OF BREATH: 0
WHEEZING: 0
CHILLS: 0
ADENOPATHY: 0
EYE ITCHING: 0
APPETITE CHANGE: 0
TREMORS: 0
WOUND: 0
EYE PAIN: 0
VOMITING: 0
DYSURIA: 0
CONSTIPATION: 0
HEADACHES: 0
HALLUCINATIONS: 0
POLYDIPSIA: 0
UNEXPECTED WEIGHT CHANGE: 0
SORE THROAT: 0
HEMATURIA: 0
RHINORRHEA: 0
PHOTOPHOBIA: 0
SEIZURES: 0
NECK STIFFNESS: 0
FREQUENCY: 0
MYALGIAS: 0
JOINT SWELLING: 0
NECK PAIN: 0
EYE REDNESS: 0
CHEST TIGHTNESS: 0
SLEEP DISTURBANCE: 0
BACK PAIN: 0
WEAKNESS: 0
SINUS PRESSURE: 0
DIARRHEA: 0
BLOOD IN STOOL: 0
DYSPHORIC MOOD: 0
ACTIVITY CHANGE: 0
TROUBLE SWALLOWING: 0
DIAPHORESIS: 0
SPEECH DIFFICULTY: 1
FACIAL ASYMMETRY: 0
COUGH: 0
ABDOMINAL DISTENTION: 0
FLANK PAIN: 0
CHOKING: 0

## 2025-04-18 ASSESSMENT — PATIENT HEALTH QUESTIONNAIRE - PHQ9
1. LITTLE INTEREST OR PLEASURE IN DOING THINGS: NOT AT ALL
SUM OF ALL RESPONSES TO PHQ9 QUESTIONS 1 AND 2: 0
2. FEELING DOWN, DEPRESSED OR HOPELESS: NOT AT ALL

## 2025-04-18 NOTE — PROGRESS NOTES
Subjective   Patient ID: Kehinde Gonzalez is a 30 y.o. male who presents for Annual Exam (Physical and review labs.).    Subjective  Kehinde Gonzalez is a 29 y.o. male and is here for a comprehensive physical exam. The patient reports feeling well.    Do you take any herbs or supplements that were not prescribed by a doctor? no  Are you taking calcium supplements? no  Are you taking aspirin daily? no      History:  Any STD's in the past? none         Review of Systems   Constitutional:  Negative for activity change, appetite change, chills, diaphoresis, fatigue and unexpected weight change.   HENT:  Negative for congestion, ear pain, hearing loss, nosebleeds, postnasal drip, rhinorrhea, sinus pressure, sneezing, sore throat, tinnitus, trouble swallowing and voice change.    Eyes:  Negative for photophobia, pain, discharge, redness, itching and visual disturbance.   Respiratory:  Negative for cough, choking, chest tightness, shortness of breath and wheezing.    Cardiovascular:  Negative for chest pain and leg swelling.   Gastrointestinal:  Negative for abdominal distention, blood in stool, constipation, diarrhea, nausea and vomiting.   Endocrine: Negative for cold intolerance, heat intolerance, polydipsia and polyuria.   Genitourinary:  Negative for dysuria, flank pain, frequency, hematuria and urgency.   Musculoskeletal:  Positive for gait problem. Negative for arthralgias, back pain, joint swelling, myalgias, neck pain and neck stiffness.   Skin:  Negative for rash and wound.   Allergic/Immunologic: Negative for immunocompromised state.   Neurological:  Positive for speech difficulty. Negative for dizziness, tremors, seizures, syncope, facial asymmetry, weakness, numbness and headaches.        Developmentally disabled   Hematological:  Negative for adenopathy.   Psychiatric/Behavioral:  Positive for behavioral problems (Adequately controlled with meds). Negative for dysphoric mood, hallucinations, sleep disturbance  "and suicidal ideas.      Objective   /65   Pulse (!) 111   Temp 35.5 °C (95.9 °F) (Temporal)   Resp 17   Ht 1.702 m (5' 7\")   Wt 60.6 kg (133 lb 8 oz)   SpO2 97%   BMI 20.91 kg/m²     Physical Exam  Constitutional:       General: He is not in acute distress.     Appearance: He is not ill-appearing or diaphoretic.      Comments: Marfanoid habitus   HENT:      Head: Normocephalic and atraumatic.      Right Ear: External ear normal.      Left Ear: External ear normal.      Nose: Nose normal. No rhinorrhea.      Mouth/Throat:      Mouth: Mucous membranes are moist.   Eyes:      General: Lids are normal. No scleral icterus.        Right eye: No discharge.         Left eye: No discharge.      Conjunctiva/sclera: Conjunctivae normal.   Cardiovascular:      Rate and Rhythm: Normal rate and regular rhythm.      Pulses: Normal pulses.      Heart sounds: No murmur heard.  Pulmonary:      Effort: Pulmonary effort is normal. No respiratory distress.      Breath sounds: No decreased breath sounds, wheezing, rhonchi or rales.   Abdominal:      General: Bowel sounds are normal. There is no distension.      Palpations: Abdomen is soft. There is no mass.      Tenderness: There is no abdominal tenderness. There is no guarding or rebound.   Musculoskeletal:         General: No swelling or tenderness.      Cervical back: No rigidity or tenderness.      Right lower leg: No edema.      Left lower leg: No edema.   Lymphadenopathy:      Cervical: No cervical adenopathy.      Upper Body:      Right upper body: No supraclavicular adenopathy.      Left upper body: No supraclavicular adenopathy.   Skin:     General: Skin is warm and dry.      Coloration: Skin is not jaundiced or pale.      Findings: No erythema, lesion or rash.   Neurological:      General: No focal deficit present.      Mental Status: He is alert and oriented to person, place, and time.      Sensory: No sensory deficit.      Motor: No weakness or tremor.      " Coordination: Coordination abnormal.      Gait: Gait abnormal.   Psychiatric:         Mood and Affect: Mood normal. Affect is not inappropriate.      Comments: Garbled speech unchanged  Exhibits behavioral abnormalities on occasion       Assessment/Plan   Diagnoses and all orders for this visit:  Routine general medical examination at a health care facility  -     Follow Up In [facility] - Established  -     CBC and Auto Differential; Future  -     Comprehensive Metabolic Panel; Future  -     Lipid Panel; Future  -     Magnesium; Future  -     TSH with reflex to Free T4 if abnormal; Future  -     Follow Up In [facility]; Future  Chronic systolic (congestive) heart failure  -     Follow Up In [facility] Established  -     CBC and Auto Differential; Future  -     Comprehensive Metabolic Panel; Future  -     Lipid Panel; Future  -     Magnesium; Future  -     TSH with reflex to Free T4 if abnormal; Future  -     Follow Up In [facility]; Future  Agenesis of corpus callosum (Multi)  -     Follow Up In [facility]; Future  Other generalized epilepsy and epileptic syndromes, not intractable, 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 Patient Counseling:  --Nutrition: Stressed importance of moderation in sodium/caffeine intake, saturated fat and cholesterol, caloric balance, sufficient intake of fresh fruits, vegetables, fiber, calcium, iron.  --Exercise: Stressed the importance of regular exercise.   --Substance Abuse: Discussed cessation/primary prevention of tobacco, alcohol, or other drug use; driving or  other dangerous activities under the influence; availability of treatment for abuse.   --Injury prevention: Discussed safety belts, safety helmets, smoke detector, smoking near bedding or upholstery.   --Dental health: Discussed importance of regular tooth brushing, flossing, and dental visits.  --Immunizations reviewed.  --Discussed benefits of screening colonoscopy.  Due age 45  3. Discussed the patient's BMI with him.  The BMI is in the acceptable range.  4. Follow up in one year

## 2025-04-21 DIAGNOSIS — F41.9 ANXIETY: ICD-10-CM

## 2025-04-21 RX ORDER — DESVENLAFAXINE 100 MG/1
100 TABLET, EXTENDED RELEASE ORAL DAILY
Qty: 30 TABLET | Refills: 2 | OUTPATIENT
Start: 2025-04-21

## 2025-04-23 ENCOUNTER — APPOINTMENT (OUTPATIENT)
Dept: BEHAVIORAL HEALTH | Facility: CLINIC | Age: 30
End: 2025-04-23
Payer: MEDICAID

## 2025-04-23 VITALS
DIASTOLIC BLOOD PRESSURE: 67 MMHG | HEART RATE: 101 BPM | TEMPERATURE: 97.9 F | RESPIRATION RATE: 18 BRPM | BODY MASS INDEX: 21.24 KG/M2 | SYSTOLIC BLOOD PRESSURE: 103 MMHG | WEIGHT: 135.6 LBS

## 2025-04-23 DIAGNOSIS — F91.9 DISRUPTIVE BEHAVIOR DISORDER: ICD-10-CM

## 2025-04-23 DIAGNOSIS — F71 MODERATE INTELLECTUAL DISABILITY: ICD-10-CM

## 2025-04-23 DIAGNOSIS — F41.9 ANXIETY: Primary | ICD-10-CM

## 2025-04-23 DIAGNOSIS — F48.2 PBA (PSEUDOBULBAR AFFECT): ICD-10-CM

## 2025-04-23 PROCEDURE — 99215 OFFICE O/P EST HI 40 MIN: CPT | Performed by: NURSE PRACTITIONER

## 2025-04-23 PROCEDURE — 1036F TOBACCO NON-USER: CPT | Performed by: NURSE PRACTITIONER

## 2025-04-23 RX ORDER — DIVALPROEX SODIUM 500 MG/1
500 TABLET, FILM COATED, EXTENDED RELEASE ORAL NIGHTLY
Qty: 30 TABLET | Refills: 3 | Status: SHIPPED | OUTPATIENT
Start: 2025-04-23 | End: 2025-08-21

## 2025-04-23 RX ORDER — BUSPIRONE HYDROCHLORIDE 10 MG/1
TABLET ORAL
Qty: 60 TABLET | Refills: 3 | Status: SHIPPED | OUTPATIENT
Start: 2025-04-23

## 2025-04-23 RX ORDER — DESVENLAFAXINE 100 MG/1
100 TABLET, EXTENDED RELEASE ORAL DAILY
Qty: 30 TABLET | Refills: 3 | Status: SHIPPED | OUTPATIENT
Start: 2025-04-23

## 2025-04-23 NOTE — PATIENT INSTRUCTIONS
1.  Increase BuSpar 10 mg by mouth every morning and 10 mg by mouth at 3 PM for ROBIN.  2 continue Nuedexta  3.  Continue desvenlafaxine (Pristiq) 100 mg by mouth every morning for anxiety.  Discussed insurance recommendation of Lexapro.  Pristiq chosen due to pharmacodynamics testing results.  Continue Depakote.  5.  Return to clinic Monday, June 16, 2025 at 8 AM virtual

## 2025-04-23 NOTE — PROGRESS NOTES
ASSESSMENT: Mr. Gonzalez presents with minimal improvements and impulsive,unable to sit still, decreased focus. Not candidate for ADHD stimulant dt past report caused emotional liability and taking daily Boost for weight.  Nor bupropion (Wellbutrin) due to seizure history.  Biggest concern today is threats of suicide and irritability.  Per his history, BuSpar decreased to these episodes.  He is described as always in a low mood.  Caregivers report no significant benefits noted with divalproex.    See treatment plan below.     Pharmacogenomics Testing (PGT) June 2021 results reviewed:  Normal Folic Acid Conversion  Use As Directed: Desvenlafaxine (Pristiq), Buspirone (BuSpar), & sertraline (Zoloft) and Divalproex Sodium (Depakote).      PLAN:                                                                                                     problems treated   f/u requested to prevent relapse   medications renewed/re-ordered    Increase by double BuSpar 10 mg by mouth q AM and 10 mg by mouth at 3 PM for ROBIN.  Continue dextromethorphan- quinidine (Nuedexta) 20-10 mg by mouth twice daily for pseudobulbar affect.  9/6/24 CNS-LS (Center for Neurologic Study- Lability Scale) score 28(13 required for PBA tx)  3. Continue desvenlafaxine 100 mg by mouth every morning for anxiety.  4. Continue Divalproex Sodium (Depakote) 500 mg by mouth at HS for moods. Started Dec 2023. Staff report no real difference.  5. Risks, benefits, alternatives, off-label uses, and side effects of medications have been discussed with patient/caregiver. There is no report of signs/symptoms consistent with medication-induced impairment in daily functioning. At this time, benefits of medication felt to outweigh potential risks. Will continue to reassess need for psychotropic medication at regular  intervals.  6. Return to clinic Monday June 16, 2025 at 8 AM virtual or earlier if needed. Call [phone] to reschedule.     Thank you for seeing me  "today. If you have any questions or concerns, do not hesitate to contact my office.  Tamanna Romano    TREATMENT TYPE         counseling and coordination of care; addressing signs and symptoms of illness; risks/benefits and side effects of medications; and behavioral approaches to illness.  This note was created using electronic dictation. There may be errors in syntax and meaning. Please contact the office with any questions.  For [facility] residents, [employer] is a 24/7 hotline you can call for assistance [[phone]].   Please call 911 or go to your closest Emergency Room if you feel worse. This includes thoughts of hurting yourself or anyone else, or having other troubles such as hearing voices, seeing visions, or having new and scary thoughts about the people around you.       PRESENT FOR APPOINTMENT  Client   NAHID Gongora, known 1.5 yrs  Ai Hodgson  Not present: Isabelle Marquez, mother/Guardian  Virtual or Telephone Consent    An interactive audio and video telecommunication system which permits real time communications between the patient (at the originating site) and provider (at the distant site) was utilized to provide this telehealth service.   Verbal consent was requested and obtained from Kehinde Gonzalez on this date, 04/23/25 for a telehealth visit.       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      Last seen Jan 2025. Desvenlafaxine was increased.   October 2024.  At that time, Nuedexta was increased and propranolol 10-day prescription was given for withdrawal tremors.  September 2024.  At that time, risperidone and dextromethorphan-guaifenesin were discontinued. dextromethorphan- quinidine (Nuedexta) was started.  April 2024.  At that time, no medication changes.  February 2024.  At that time, sertraline was discontinued due to ineffective and " low serum levels at maximum dose.  November 2023. At that time, Depakote was started.   October 2023.  At that time, no medication changes due to pending lab results.  August 2023. At that time, no medication changes.  July 2023. At that time, venlafaxine was decreased.  June 26, 2023. At that time, desvenlafaxine was increased and timing on BuSpar was adjusted (from 12 hours to 8 hours).  June 1, 2023. At that time, no medication changes. On desvenlafaxine HCL ER for 11 days and was his last day on Clonazepam.   May 2023. At that time, desvenlafaxine HCL ER was started.   March 2023. At that time, clonazepam was decreased due to possible disinhibition.  December 2022. At that time, no medication changes.  November 2022. At that time, BuSpar was started.  October 2022. At that time, no medication changes.  July 2022. At that time, Risperdal was restarted per request for skin excoriation, Ziprasidone and Colace were discontinued.  May 2022. At that time, sertraline (Zoloft) was increased.  April 2022: sertraline was increased.   February 23, 2022. At that time, Sertraline was increased.  February 1, 2022. At that time, NAC was DC'd, Ziprasidone (Geodon) & Clonazepam were increased.  January 5, 2022. At that time, Geodon (Ziprasidone) was started   November 2021. At that time, Lurasidone (Latuda) was increased.  September 2021. At that time, Abilify was DC'd (not effective) and Latuda was started (chosen dt new cardiac concerns).  August 2021. At that time, sertraline (ZoloftÂ®) was increased.   June 2021. At that time, fluoxetine (Prozac) was DC'd & sertraline (ZoloftÂ®) was started.   May 2021. At that time, Abilify was increased.  April 2021. At that time, no medication changes.  February 2021. At that time, no medication changes.  January 28, 2021. At that time, no medication changes.  January 5, 2021. At that time, Klonopin was decreased, Abilify & Colace were started.  November 2020. At that time, NAC was  "increased.   October 2020. At that time, Klonopin was decreased and Fluoxetine was increased.   August 2020. At that time, no medication changes.  July 2020. At that time, NAC was increased.  May 2020. At that time, no medication changes.  January 2020. At that time, no medication changes.  November 2019. At that time, NAC was increased. Jan 2020, Risperdal was DC'd.   September. At that time, Risperdal was increased and NAC was started.   Aug 2019. At that time, fluoxetine was increased and Risperdal was decreased.  June 2019. At that time, fluoxetine was increased and Risperdal was decreased.  April 2019. At that time, no med changes.     Cheko reports that he enjoyed his birthday. Had a big pizza party.  Has been watching baseball. Reports team won.  He states that he feels good.  Easily irritated, hard to sit still and concentrate.  Increased self-hitting and stating that he is going to kill himself. Quick raps of 3 in precession. Staff state that it is more the SI statements. IE kicking a chair and stating \"I want to shoot myself\".  Will punch and kick at items, but no property destruction.  Now getting snippy with staff, which is new.  Sleep: tossing and turning. Has been complaining to staff lately.  Went to Mom's house for [place] [employer].   Started Day Program [facility] in [place]. Previously at [facility].  Increased frustration and anger focused at peers.Daily schedule: [facility] [employer], since September 2019.   Agenesis of the corpus callosum  PHQ-9 score 5 on 4/30/24    With Neudexta:   specifically identifying a day that he will \"kill\" or \"shoot\" himself.  For example, I am going to shoot myself in 10 years, as compared to Friday.  No longer SIB, but no change in skin excoriation.  Staff report he always appears negative.  He can become in a rage at any moment, readily on edge,  never really appears aggressive towards staff.  No longer having crying episodes.  Behaviors diff to redirect and lasting longer, up " "to all day. Decreased emotion outbursts, increased angry outbursts. Property dest, glasses x 2, shirts, slamming iPad.  Weird noises, unable to sit still, face movements. Time to eat, Rapid, needs to slow, becomes upset/disruptive.  Speech clearer.  Staff reports that his behaviors are incongruent with what is going on and are out of proportion.    Hx SIB with injury: scratches on face, hits his head, and will pull off his own finger/toe nails if bothering him.  All day \"Bullying\" of peer that is lower functioning (not able to defend self against Cheko).  Staff report SIB 45 min of pounding on head in frustration after peer involvement, also hitting self more.   No de-escalation would work. Hitting dents into the wall.   Repeats kill myself verbals.     He visits his Mom and goes to [place]. He enjoys going there, especially for the cats. One Calico cat, Maribell, sleeps on his chest.  5/20/23 started taking venlafaxine HCL ER. Significant change in behaviors: worse: hitting self and others more. Threatening to harm and kill himself more. Increased behaviors surrounding wanting to eat more. Describes as increased obsessions with food. Increased SIB: scratching (scratches on face) & hitting self (sores on back of head). Property destruction: broke his iPad. Closer to hitting others. Increased frustration level with longer, more difficult periods to calm down. Redirection no longer works. IE: shoe fell off; took 30+ minutes to calm.  \"Angry with self constantly\"  Seriousness of targeting peers with more cognitive issues, communication concerns and those with WC or walkers. Has never gone away.      He has been staying busy. Doing extra work.  [facility]- frustration with losing sight. Requiring holding onto staff's arm in unfamiliar places. [facility] more hope after apt. Adaptive piece to magnify iPad.   Deficits causing increase in depression. Resorts to saying he is going to kill himself at any " frustration.     his grandma passed away November 2022. It's been hard, but he looks at FB pictures and distracts by talking to staff and working.     Continues with cardiac services. On 3 heart medications  Appetite decreased when anxious     Infatuation with others dirty attends w/sexual arousal  Increased urinary incontinence; especially when frustrated & in the night     Resides: [facility] since October 5, 2021. His home has 7 same-age male peers. Shares room w/1 male peer. Caregiver reports that he still remains angry 90% of the time. Irritability with threatening, punching walls, kicking items, and waving his fists/arm swings.     He utilizes headphones, which appear to be noise canceling. Tries to use his iPad, but having difficulty.  SIB. Pounds the sided of his temples, punch and kick the door, only when he does something he feels wrong. IE: drops something or gets something wrong.  If he runs into the wall, he will want to punch the wall.  Towards others: will raise his arm like he is going to punch them.     MEDICATIONS: Past: Ritalin- effect unknown.  Adderall, Strattera and Concerta caused emotional changes moodiness.  Sertraline (Zoloft) appeared to be ineffective.  November 2022: start of BuSpar, staff note improvement in fighting with others, anger, verbal threats towards others and self. Continues to have irritability towards same peer. Staff report skin excoriation improved. No open areas: fingers, back of head, nose and in ear.   Abilify- minimal improvement in irritably  ziprasidone (Geodon)- had not yet been maximized to its full potential. Declined increase. Med education provided that this was not a medication failure. May need to consider using again in the future.  Moderate gene to drug interaction: Risperdal with the clinical consideration that serum level may be too low and higher doses may be required. (Extensive metabolizers). He was previously on 5 mg daily and was not  "effective. HX prolactinemia November 2022: 56ng/mL (therapeutic range 4-15). Reports asymptomatic. Unable to obtain risperidone serum level to see how he metabolizes this medication. History of leukopenia and neutropenia. April 2024 WBC 2.9 (range should be 4.8-10.8); ANC 1300 (should be greater than 1500).     Psy/SI/HI/aggression: Denies A/VH, paranoia. Denies SI/HI  Continuous talking appears to be ongoing since prior to age 9.     Appetite: good. 2016. Dx dysphagia velopharyngeal insufficiency. Seen & evaluation by otolaryngic Dr. New Mahmood. No concerns noted & recommended speech therapy. Chopped tough meats.        History of 2-infantile surgeries:Hypospadius & inguinal hernia and hydrocele for repair.  Med Physicians:  PCP:Dr. Smith: routine & PRN visits.   Endocrinology: Dr. Castano from [facility].   Genetics: Dr. Marlen Rogers. Denied DX of Marfan Syndrome & Ruiz- Fryns Syndrome  Cardiologist: Started carvedilol for Left ventricular dysfunction.  Evaluated by Dr. Amara Bean, on 5/13/21  Dentist: Routine.   Hx of excessive saliva. Most likely dt keeping his head down, chin on chest. With the DC of Risperdal, Cheko reports that he no longer bites the inside of his cheek. He also seems to be able to enunciate better.   Eye: Wears bifocals   Neurologist: last seen in 2016. Appears primary care doctor prescribes Lamictal for epilepsy. Unknown last seizure. Per old chart, seizures started at approximately 8 years of age. Has upward eye rolling with facial twitching and fist clenching. EEG at that time, mother reported was normal.  Neurologist diagnosed ADHD; \"Fidgety boy who bounces,\" history of impulsiveness with darting, issues keeping his hands to himself, growling behaviors, a worrier who is bothered by noises from the smoke detector or a fire alarm. He insists on things being arranged in a certain way, somewhat fearful above low tolerance and is easily stressed.     MEDICAL REVIEW OF SYSTEMS:  GEN: denies " "fever, chills, night sweats  HEENT: denies changes in vision, eye pain, hearing changes, no symptoms of upper respiratory infection  CARDIO: denies chest pain and palpitations   RESP: denies shortness of breath  GI: denies nausea/vomiting/constipation/diarrhea, or blood in the stool  : denies burning/frequency/urgency, or bloody urine  NEURO: denies tremor, dizziness, numbness or tingling. Hx infantile seizures  MSK: denies muscle spasms or stiffness. See 'Communication\" below. Sits with torso leaning to left, difficult for him to sit straight & when head is looking at this writer, left ear tilted towards his left shoulder. Asked ct to stand: Left shoulder approx 3 inches higher than right. Lumbar Lordosis noted.  Suspect Structural scoliosis  DERM: denies new onset of rash     Med SE: none noted or reported     COMPLIANCE: good     MMS:  ORIENTATION   alert  ambulatory  cooperative   dysmorphic features     BEHAVIOR:  enters easily  eye contact normal  gait normal  reciprocal interaction  spontaneous speech     MEMORY:  poor remote  poor recent     SENSORY :  Bifocal glasses     COMMUNICATION:  conversational  speech dysarthria- very difficult to understand. Per chart: nasal talk. Improved with Risperdal DC.   Facial asymmetrical. Entire left side of face lower than right. Question if part of speech issue.   Staff state stroke has been ruled out.     AFFECT:  normal/full     MOOD: anxious      THOUGHT PROCESS:  concrete   perseverative      THOUGHT Content:  obsessive     CONCENTRATION  Easily distracted     FUND OF KNOWLEDGE :  mod disability- fx at approx age 8 yo     JUDGEMENT: posed situations     EPS: none noted or reported.   AIMS N/A     LABS REVIEWED:  April 2025 July 2024 VPA 25.8 micrograms/mL ()  April 2024  WBC 2.9  4.8 - 10.8 K/uL   Dec 2023   Valproic Acid Lvl 36.7 ug/mL  50.0 - 100.0   Lamotrigine Lvl 9.6 ug/mL  3.0 - 15.0 ug/mL   May 2023 in chart  November 2022 prolactin 56 NG/mL " (4â€“15)  Oct 2020:  TSH, Thyroid, T4, prolactin & Cortisol- WNL  October 2019:  CBC/Diff: WBC 3.7; RBC 4.35  TSH, Lipid, & Vitamin D (58)- WNL  CMP: Alk phos 106; creatinine 0.57, Globulin 2.2      Feb 2019: reviewed. WBC, RBC, H/H were low at that time.     EKG = sees cardiologist  9/18/2024 prior to Nuedexta start:  82 bpm  QTc 420 MS  Normal sinus rhythm  Rightward axis  Otherwise normal ECG  When compared with ECG of 04-JUL-2022 21:07,  No significant change was found    July 2022   Normal sinus rhythm  Right axis deviation  Incomplete right bundle branch block  Abnormal ECG  74 bpm  No previous ECGs available    Qtc 412 ms  May 2021  73 bpm  QTc 412  May 2019:   85 bpm  QTc 435

## 2025-04-24 ENCOUNTER — APPOINTMENT (OUTPATIENT)
Dept: BEHAVIORAL HEALTH | Facility: CLINIC | Age: 30
End: 2025-04-24
Payer: MEDICAID

## 2025-05-16 NOTE — PROGRESS NOTES
Dalton Anderson (: 1995) is a 32 y.o. male, Established patient, here for evaluation of the following chief complaint(s):  Hand Injury (right hand has sores and blisters that haven't been healing correctly. Skin has also been shedding, creams/antibiotics have not worked. Started a few weeks ago. Inflamed, itchy, spreading, and irritated)        ASSESSMENT/PLAN:  1. Dyshidrotic eczema  - stp wearing gloves   - keep hand clean and dry, using topical steroid cream as prescribed   - clotrimazole-betamethasone (LOTRISONE) 1-0.05 % cream; Apply topically 2 times daily for 7 days Apply topically 2 times daily. Dispense: 15 g; Refill: 0      No follow-ups on file. SUBJECTIVE/OBJECTIVE:  HPI      Hand dermatitis  Seen and treated in ED with Lotrisone cream and Keflex  States it is no better   He has been wearing gloves and hands are sweating all the time      Review of Systems   Constitutional: Negative. Skin:  Positive for rash and wound. Physical Exam  Constitutional:       Appearance: Normal appearance. Skin:     General: Skin is warm. Findings: Erythema (with peeling of the skin  of the right fingers  and hand) and rash present. Neurological:      Mental Status: He is alert. Vitals:    22 1736   BP: 100/62   Site: Right Upper Arm   Position: Sitting   Cuff Size: Medium Adult   Pulse: 81   Temp: 98 °F (36.7 °C)   TempSrc: Temporal   SpO2: 98%   Weight: 120 lb (54.4 kg)   Height: 5' 7\" (1.702 m)                 An electronic signature was used to authenticate this note.     --EVELINA Cameron
NEGATIVE

## 2025-05-22 ENCOUNTER — APPOINTMENT (OUTPATIENT)
Dept: BEHAVIORAL HEALTH | Facility: CLINIC | Age: 30
End: 2025-05-22
Payer: MEDICAID

## 2025-06-16 ENCOUNTER — TELEMEDICINE (OUTPATIENT)
Dept: BEHAVIORAL HEALTH | Facility: CLINIC | Age: 30
End: 2025-06-16
Payer: MEDICAID

## 2025-06-16 ENCOUNTER — APPOINTMENT (OUTPATIENT)
Dept: BEHAVIORAL HEALTH | Facility: CLINIC | Age: 30
End: 2025-06-16
Payer: MEDICAID

## 2025-06-16 VITALS — DIASTOLIC BLOOD PRESSURE: 57 MMHG | TEMPERATURE: 98.4 F | RESPIRATION RATE: 18 BRPM | SYSTOLIC BLOOD PRESSURE: 98 MMHG

## 2025-06-16 DIAGNOSIS — F41.9 ANXIETY: ICD-10-CM

## 2025-06-16 DIAGNOSIS — F91.9 DISRUPTIVE BEHAVIOR DISORDER: ICD-10-CM

## 2025-06-16 DIAGNOSIS — F48.2 PBA (PSEUDOBULBAR AFFECT): ICD-10-CM

## 2025-06-16 DIAGNOSIS — F41.9 ANXIETY: Primary | ICD-10-CM

## 2025-06-16 DIAGNOSIS — F71 MODERATE INTELLECTUAL DISABILITY: ICD-10-CM

## 2025-06-16 PROCEDURE — 1036F TOBACCO NON-USER: CPT | Performed by: NURSE PRACTITIONER

## 2025-06-16 PROCEDURE — 90834 PSYTX W PT 45 MINUTES: CPT | Performed by: COUNSELOR

## 2025-06-16 PROCEDURE — 1036F TOBACCO NON-USER: CPT | Performed by: COUNSELOR

## 2025-06-16 PROCEDURE — 99215 OFFICE O/P EST HI 40 MIN: CPT | Performed by: NURSE PRACTITIONER

## 2025-06-16 RX ORDER — BUSPIRONE HYDROCHLORIDE 10 MG/1
TABLET ORAL
Qty: 60 TABLET | Refills: 3 | Status: SHIPPED | OUTPATIENT
Start: 2025-06-16

## 2025-06-16 RX ORDER — DIVALPROEX SODIUM 500 MG/1
500 TABLET, FILM COATED, EXTENDED RELEASE ORAL NIGHTLY
Qty: 30 TABLET | Refills: 3 | Status: SHIPPED | OUTPATIENT
Start: 2025-06-16 | End: 2025-10-14

## 2025-06-16 NOTE — PATIENT INSTRUCTIONS
Continue BuSpar 10 mg by mouth q AM and 10 mg by mouth at 3 PM for ROBIN.  Continue dextromethorphan- quinidine (Nuedexta) 20-10 mg by mouth twice daily for pseudobulbar affect.  9/6/24 CNS-LS (Center for Neurologic Study- Lability Scale) score 28(13 required for PBA tx)  3. Continue desvenlafaxine 100 mg by mouth every morning for anxiety.  4. Continue Divalproex Sodium (Depakote) 500 mg by mouth at HS for moods. Started Dec 2023. Staff report no real difference.  5. Risks, benefits, alternatives, off-label uses, and side effects of medications have been discussed with patient/caregiver. There is no report of signs/symptoms consistent with medication-induced impairment in daily functioning. At this time, benefits of medication felt to outweigh potential risks. Will continue to reassess need for psychotropic medication at regular  intervals.  6. Return to clinic Weds Sept 16, 2025 at 3 PM virtual or earlier if needed. Call [phone] to reschedule.     Thank you for seeing me today. If you have any questions or concerns, do not hesitate to contact my office.  Tamanna Romano    TREATMENT TYPE         counseling and coordination of care; addressing signs and symptoms of illness; risks/benefits and side effects of medications; and behavioral approaches to illness.  This note was created using electronic dictation. There may be errors in syntax and meaning. Please contact the office with any questions.

## 2025-06-16 NOTE — PROGRESS NOTES
"Total Time: 45 min  Diagnosis: anxiety, disruptive BX  Visit Type: via zoom (my chart reported by them as down)  Reason for visit: managing his worry and disruptive BX    - notes they met with his NP this morning and she feels like its more BX vs meds  - notes his SIB is back up and his peer to peer aggression is about the same  - he will report he is anxious and picking his skin a lot (which he was doing on session)  - staff do feel some is in his control bc luis turn it on and off  - talked about his recent social activities, summer party, outings, etc       Focused on:  - active listening  - coping skills   - continue with ways to get along with peers, morning mantras, less verbal queues and more visual, add in a distraction board and on the other side \"instead of hitting: XXX\"; try a nightly debrief with staff: how did the day go, what went well, who upset him, what emotions are you feeling, why am I so mad  - processing emotions, emotion identification, regulation, expression    "

## 2025-06-16 NOTE — PROGRESS NOTES
ASSESSMENT: Mr. Gonzalez presents unchanged with increase.  Caregivers report he gets 2-3 months of benefit on each medication and then benefits wear off.  He can control the anger for the most part.  The exaggeration now appears for attention.  Episodes are considered behavioral.  Staff consider him to be at his best as compared to 5 years ago.      See treatment plan below.     Pharmacogenomics Testing (PGT) June 2021 results reviewed:  Normal Folic Acid Conversion  Use As Directed: Desvenlafaxine (Pristiq), Buspirone (BuSpar), & sertraline (Zoloft) and Divalproex Sodium (Depakote).      PLAN:                                                                                                     problems treated   f/u requested to prevent relapse   medications renewed/re-ordered    Continue BuSpar 10 mg by mouth q AM and 10 mg by mouth at 3 PM for ROBIN.  Continue dextromethorphan- quinidine (Nuedexta) 20-10 mg by mouth twice daily for pseudobulbar affect.  9/6/24 CNS-LS (Center for Neurologic Study- Lability Scale) score 28(13 required for PBA tx)  3. Continue desvenlafaxine 100 mg by mouth every morning for anxiety.  4. Continue Divalproex Sodium (Depakote) 500 mg by mouth at HS for moods. Started Dec 2023. Staff report no real difference.  5. Risks, benefits, alternatives, off-label uses, and side effects of medications have been discussed with patient/caregiver. There is no report of signs/symptoms consistent with medication-induced impairment in daily functioning. At this time, benefits of medication felt to outweigh potential risks. Will continue to reassess need for psychotropic medication at regular  intervals.  6. Return to clinic Weds Sept 16, 2025 at 3 PM virtual or earlier if needed. Call [phone] to reschedule.     Thank you for seeing me today. If you have any questions or concerns, do not hesitate to contact my office.  Tamanna Romano    TREATMENT TYPE         counseling and coordination of care;  "addressing signs and symptoms of illness; risks/benefits and side effects of medications; and behavioral approaches to illness.  This note was created using electronic dictation. There may be errors in syntax and meaning. Please contact the office with any questions.  For [facility] residents, [facility] is a 24/7 hotline you can call for assistance [[phone]].   Please call 911 or go to your closest Emergency Room if you feel worse. This includes thoughts of hurting yourself or anyone else, or having other troubles such as hearing voices, seeing visions, or having new and scary thoughts about the people around you.       PRESENT FOR APPOINTMENT  Client   NAHID Gongora, known 1.5 yrs  Ai Hodgson  Not present: Isabelle Marquez, mother/Guardian  Virtual or Telephone Consent    An interactive audio and video telecommunication system which permits real time communications between the patient (at the originating site) and provider (at the distant site) was utilized to provide this telehealth service.   Verbal consent was requested and obtained from Kehinde Gonzalez on this date, 06/16/25 for a telehealth visit.       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      Last seen in April 2025.  At that time, BuSpar was increased.  Jan 2025. Desvenlafaxine was increased.   October 2024.  At that time, Nuedexta was increased and propranolol 10-day prescription was given for withdrawal tremors.  September 2024.  At that time, risperidone and dextromethorphan-guaifenesin were discontinued. dextromethorphan- quinidine (Nuedexta) was started.  April 2024.  At that time, no medication changes.  February 2024.  At that time, sertraline was discontinued due to ineffective and low serum levels at maximum dose.  November 2023. At that time, Depakote was started.   October 2023.  At " that time, no medication changes due to pending lab results.  August 2023. At that time, no medication changes.  July 2023. At that time, venlafaxine was decreased.  June 26, 2023. At that time, desvenlafaxine was increased and timing on BuSpar was adjusted (from 12 hours to 8 hours).  June 1, 2023. At that time, no medication changes. On desvenlafaxine HCL ER for 11 days and was his last day on Clonazepam.   May 2023. At that time, desvenlafaxine HCL ER was started.   March 2023. At that time, clonazepam was decreased due to possible disinhibition.  December 2022. At that time, no medication changes.  November 2022. At that time, BuSpar was started.  October 2022. At that time, no medication changes.  July 2022. At that time, Risperdal was restarted per request for skin excoriation, Ziprasidone and Colace were discontinued.  May 2022. At that time, sertraline (Zoloft) was increased.  April 2022: sertraline was increased.   February 23, 2022. At that time, Sertraline was increased.  February 1, 2022. At that time, NAC was DC'd, Ziprasidone (Geodon) & Clonazepam were increased.  January 5, 2022. At that time, Geodon (Ziprasidone) was started   November 2021. At that time, Lurasidone (Latuda) was increased.  September 2021. At that time, Abilify was DC'd (not effective) and Latuda was started (chosen dt new cardiac concerns).  August 2021. At that time, sertraline (ZoloftÂ®) was increased.   June 2021. At that time, fluoxetine (Prozac) was DC'd & sertraline (ZoloftÂ®) was started.   May 2021. At that time, Abilify was increased.  April 2021. At that time, no medication changes.  February 2021. At that time, no medication changes.  January 28, 2021. At that time, no medication changes.  January 5, 2021. At that time, Klonopin was decreased, Abilify & Colace were started.  November 2020. At that time, NAC was increased.   October 2020. At that time, Klonopin was decreased and Fluoxetine was increased.   August 2020. At  "that time, no medication changes.  July 2020. At that time, NAC was increased.  May 2020. At that time, no medication changes.  January 2020. At that time, no medication changes.  November 2019. At that time, NAC was increased. Jan 2020, Risperdal was DC'd.   September. At that time, Risperdal was increased and NAC was started.   Aug 2019. At that time, fluoxetine was increased and Risperdal was decreased.  June 2019. At that time, fluoxetine was increased and Risperdal was decreased.  April 2019. At that time, no med changes.     Cheko reports that he more anxious, fidgety and angry.  Has been watching baseball.   Easily irritated, hard to sit still and concentrate.  Increased self-hitting and stating that he is going to kill himself. Quick raps of 3 in precession. Staff state that it is more the SI statements. IE kicking a chair and stating \"I want to shoot myself\".  Will punch and kick at items, but no property destruction.  Now getting snippy with staff, which is new.  Sleep: tossing and turning. Has been complaining to staff lately.  Went to Mom's house for [place].   Started [facility] in [place]. Previously at [employer].  Increased frustration and anger focused at peers.Daily schedule: [employer], since September 2019.   Agenesis of the corpus callosum  PHQ-9 score 5 on 4/30/24    With Neudexta:   specifically identifying a day that he will \"kill\" or \"shoot\" himself.  For example, I am going to shoot myself in 10 years, as compared to Friday.  No longer SIB, but no change in skin excoriation.  Staff report he always appears negative.  He can become in a rage at any moment, readily on edge,  never really appears aggressive towards staff.  No longer having crying episodes.  Behaviors diff to redirect and lasting longer, up to all day. Decreased emotion outbursts, increased angry outbursts. Property dest, glasses x 2, shirts, slamming iPad.  Weird noises, unable to sit still, face movements. " "Time to eat, Rapid, needs to slow, becomes upset/disruptive.  Speech clearer.  Staff reports that his behaviors are incongruent with what is going on and are out of proportion.    Hx SIB with injury: scratches on face, hits his head, and will pull off his own finger/toe nails if bothering him.  All day \"Bullying\" of peer that is lower functioning (not able to defend self against Cheko).  Staff report SIB 45 min of pounding on head in frustration after peer involvement, also hitting self more.   No de-escalation would work. Hitting dents into the wall.   Repeats kill myself verbals.     He visits his Mom and goes to [place]. He enjoys going there, especially for the cats. One Calico cat, Maribell, sleeps on his chest.  5/20/23 started taking venlafaxine HCL ER. Significant change in behaviors: worse: hitting self and others more. Threatening to harm and kill himself more. Increased behaviors surrounding wanting to eat more. Describes as increased obsessions with food. Increased SIB: scratching (scratches on face) & hitting self (sores on back of head). Property destruction: broke his iPad. Closer to hitting others. Increased frustration level with longer, more difficult periods to calm down. Redirection no longer works. IE: shoe fell off; took 30+ minutes to calm.  \"Angry with self constantly\"  Seriousness of targeting peers with more cognitive issues, communication concerns and those with WC or walkers. Has never gone away.      He has been staying busy. Doing extra work.  [facility]- frustration with losing sight. Requiring holding onto staff's arm in unfamiliar places. [place] more hope after apt. Adaptive piece to magnify iPad.   Deficits causing increase in depression. Resorts to saying he is going to kill himself at any frustration.     his grandma passed away November 2022. It's been hard, but he looks at FB pictures and distracts by talking to staff and working.     Continues with cardiac services. " On 3 heart medications  Appetite decreased when anxious     Infatuation with others dirty attends w/sexual arousal  Increased urinary incontinence; especially when frustrated & in the night     Resides: [facility] since October 5, 2021. His home has 7 same-age male peers. Shares room w/1 male peer. Caregiver reports that he still remains angry 90% of the time. Irritability with threatening, punching walls, kicking items, and waving his fists/arm swings.     He utilizes headphones, which appear to be noise canceling. Tries to use his iPad, but having difficulty.  SIB. Pounds the sided of his temples, punch and kick the door, only when he does something he feels wrong. IE: drops something or gets something wrong.  If he runs into the wall, he will want to punch the wall.  Towards others: will raise his arm like he is going to punch them.     MEDICATIONS: Past: Ritalin- effect unknown.  Adderall, Strattera and Concerta caused emotional changes moodiness.  Sertraline (Zoloft) appeared to be ineffective.  November 2022: start of BuSpar, staff note improvement in fighting with others, anger, verbal threats towards others and self. Continues to have irritability towards same peer. Staff report skin excoriation improved. No open areas: fingers, back of head, nose and in ear.   Abilify- minimal improvement in irritably  ziprasidone (Geodon)- had not yet been maximized to its full potential. Declined increase. Med education provided that this was not a medication failure. May need to consider using again in the future.  Moderate gene to drug interaction: Risperdal with the clinical consideration that serum level may be too low and higher doses may be required. (Extensive metabolizers). He was previously on 5 mg daily and was not effective. HX prolactinemia November 2022: 56ng/mL (therapeutic range 4-15). Reports asymptomatic. Unable to obtain risperidone serum level to see how he metabolizes this medication.  "History of leukopenia and neutropenia. April 2024 WBC 2.9 (range should be 4.8-10.8); ANC 1300 (should be greater than 1500).     Psy/SI/HI/aggression: Denies A/VH, paranoia. Denies SI/HI  Continuous talking appears to be ongoing since prior to age 9.     Appetite: good. 2016. Dx dysphagia velopharyngeal insufficiency. Seen & evaluation by otolaryngic Dr. New Mahmood. No concerns noted & recommended speech therapy. Chopped tough meats.        History of 2-infantile surgeries:Hypospadius & inguinal hernia and hydrocele for repair.  Med Physicians:  PCP:Dr. Smith: routine & PRN visits.   Endocrinology: Dr. Castano from [facility].   Genetics: Dr. Marlen Rogers. Denied DX of Marfan Syndrome & Ruiz- Fryns Syndrome  Cardiologist: Started carvedilol for Left ventricular dysfunction.  Evaluated by Dr. Amara Bean, on 5/13/21  Dentist: Routine.   Hx of excessive saliva. Most likely dt keeping his head down, chin on chest. With the DC of Risperdal, Cheko reports that he no longer bites the inside of his cheek. He also seems to be able to enunciate better.   Eye: Wears bifocals   Neurologist: last seen in 2016. Appears primary care doctor prescribes Lamictal for epilepsy. Unknown last seizure. Per old chart, seizures started at approximately 8 years of age. Has upward eye rolling with facial twitching and fist clenching. EEG at that time, mother reported was normal.  Neurologist diagnosed ADHD; \"Fidgety boy who bounces,\" history of impulsiveness with darting, issues keeping his hands to himself, growling behaviors, a worrier who is bothered by noises from the smoke detector or a fire alarm. He insists on things being arranged in a certain way, somewhat fearful above low tolerance and is easily stressed.     MEDICAL REVIEW OF SYSTEMS:  GEN: denies fever, chills, night sweats  HEENT: denies changes in vision, eye pain, hearing changes, no symptoms of upper respiratory infection  CARDIO: denies chest pain and palpitations " "  RESP: denies shortness of breath  GI: denies nausea/vomiting/constipation/diarrhea, or blood in the stool  : denies burning/frequency/urgency, or bloody urine  NEURO: denies tremor, dizziness, numbness or tingling. Hx infantile seizures  MSK: denies muscle spasms or stiffness. See 'Communication\" below. Sits with torso leaning to left, difficult for him to sit straight & when head is looking at this writer, left ear tilted towards his left shoulder. Asked ct to stand: Left shoulder approx 3 inches higher than right. Lumbar Lordosis noted.  Suspect Structural scoliosis  DERM: denies new onset of rash     Med SE: none noted or reported     COMPLIANCE: good     MMS:  ORIENTATION   alert  ambulatory  cooperative   dysmorphic features     BEHAVIOR:  enters easily  eye contact normal  gait normal  reciprocal interaction  spontaneous speech     MEMORY:  poor remote  poor recent     SENSORY :  Bifocal glasses     COMMUNICATION:  conversational  speech dysarthria- very difficult to understand. Per chart: nasal talk. Improved with Risperdal DC.   Facial asymmetrical. Entire left side of face lower than right. Question if part of speech issue.   Staff state stroke has been ruled out.     AFFECT:  normal/full     MOOD: anxious      THOUGHT PROCESS:  concrete   perseverative      THOUGHT Content:  obsessive     CONCENTRATION  Easily distracted     FUND OF KNOWLEDGE :  mod disability- fx at approx age 8 yo     JUDGEMENT: posed situations     EPS: none noted or reported.   AIMS N/A     LABS REVIEWED:  April 2025 July 2024 VPA 25.8 micrograms/mL ()  April 2024  WBC 2.9  4.8 - 10.8 K/uL   Dec 2023   Valproic Acid Lvl 36.7 ug/mL  50.0 - 100.0   Lamotrigine Lvl 9.6 ug/mL  3.0 - 15.0 ug/mL   May 2023 in chart  November 2022 prolactin 56 NG/mL (4â€“15)  Oct 2020:  TSH, Thyroid, T4, prolactin & Cortisol- WNL  October 2019:  CBC/Diff: WBC 3.7; RBC 4.35  TSH, Lipid, & Vitamin D (58)- WNL  CMP: Alk phos 106; creatinine 0.57, " Globulin 2.2      Feb 2019: reviewed. WBC, RBC, H/H were low at that time.     EKG = sees cardiologist  9/18/2024 prior to Nuedexta start:  82 bpm  QTc 420 MS  Normal sinus rhythm  Rightward axis  Otherwise normal ECG  When compared with ECG of 04-JUL-2022 21:07,  No significant change was found    July 2022   Normal sinus rhythm  Right axis deviation  Incomplete right bundle branch block  Abnormal ECG  74 bpm  No previous ECGs available    Qtc 412 ms  May 2021  73 bpm  QTc 412  May 2019:   85 bpm  QTc 435

## 2025-07-23 DIAGNOSIS — E73.9 LACTOSE INTOLERANCE: Primary | ICD-10-CM

## 2025-07-23 RX ORDER — LACTASE 3000 UNIT
3000 TABLET ORAL
Qty: 90 TABLET | Refills: 11 | Status: SHIPPED | OUTPATIENT
Start: 2025-07-23 | End: 2026-07-23

## 2025-08-04 ENCOUNTER — APPOINTMENT (OUTPATIENT)
Dept: BEHAVIORAL HEALTH | Facility: CLINIC | Age: 30
End: 2025-08-04
Payer: MEDICAID

## 2025-08-31 ENCOUNTER — OFFICE VISIT (OUTPATIENT)
Dept: FAMILY MEDICINE CLINIC | Age: 30
End: 2025-08-31
Payer: MEDICAID

## 2025-08-31 VITALS
SYSTOLIC BLOOD PRESSURE: 94 MMHG | DIASTOLIC BLOOD PRESSURE: 60 MMHG | BODY MASS INDEX: 19.9 KG/M2 | HEIGHT: 70 IN | WEIGHT: 139 LBS | TEMPERATURE: 99.5 F | OXYGEN SATURATION: 98 % | HEART RATE: 113 BPM

## 2025-08-31 DIAGNOSIS — R05.1 ACUTE COUGH: ICD-10-CM

## 2025-08-31 DIAGNOSIS — J02.9 SORE THROAT: ICD-10-CM

## 2025-08-31 DIAGNOSIS — H10.33 ACUTE BACTERIAL CONJUNCTIVITIS OF BOTH EYES: Primary | ICD-10-CM

## 2025-08-31 LAB
Lab: NORMAL
PERFORMING INSTRUMENT: NORMAL
QC PASS/FAIL: NORMAL
S PYO AG THROAT QL: NORMAL
SARS-COV-2, POC: NORMAL

## 2025-08-31 PROCEDURE — 87426 SARSCOV CORONAVIRUS AG IA: CPT | Performed by: PHYSICIAN ASSISTANT

## 2025-08-31 PROCEDURE — 87880 STREP A ASSAY W/OPTIC: CPT | Performed by: PHYSICIAN ASSISTANT

## 2025-08-31 PROCEDURE — 99213 OFFICE O/P EST LOW 20 MIN: CPT | Performed by: PHYSICIAN ASSISTANT

## 2025-08-31 RX ORDER — BENZONATATE 100 MG/1
100 CAPSULE ORAL 3 TIMES DAILY PRN
Qty: 20 CAPSULE | Refills: 0 | Status: SHIPPED | OUTPATIENT
Start: 2025-08-31

## 2025-08-31 RX ORDER — ERYTHROMYCIN 5 MG/G
OINTMENT OPHTHALMIC EVERY 6 HOURS
Qty: 1 EACH | Refills: 0 | Status: SHIPPED | OUTPATIENT
Start: 2025-08-31

## 2025-08-31 SDOH — ECONOMIC STABILITY: FOOD INSECURITY: WITHIN THE PAST 12 MONTHS, YOU WORRIED THAT YOUR FOOD WOULD RUN OUT BEFORE YOU GOT MONEY TO BUY MORE.: NEVER TRUE

## 2025-08-31 SDOH — ECONOMIC STABILITY: FOOD INSECURITY: WITHIN THE PAST 12 MONTHS, THE FOOD YOU BOUGHT JUST DIDN'T LAST AND YOU DIDN'T HAVE MONEY TO GET MORE.: NEVER TRUE

## 2025-08-31 ASSESSMENT — PATIENT HEALTH QUESTIONNAIRE - PHQ9
SUM OF ALL RESPONSES TO PHQ QUESTIONS 1-9: 0
2. FEELING DOWN, DEPRESSED OR HOPELESS: NOT AT ALL
SUM OF ALL RESPONSES TO PHQ QUESTIONS 1-9: 0
1. LITTLE INTEREST OR PLEASURE IN DOING THINGS: NOT AT ALL

## 2025-09-15 ENCOUNTER — APPOINTMENT (OUTPATIENT)
Dept: BEHAVIORAL HEALTH | Facility: CLINIC | Age: 30
End: 2025-09-15
Payer: MEDICAID

## 2025-09-16 ENCOUNTER — APPOINTMENT (OUTPATIENT)
Dept: BEHAVIORAL HEALTH | Facility: CLINIC | Age: 30
End: 2025-09-16
Payer: MEDICAID

## 2026-04-20 ENCOUNTER — APPOINTMENT (OUTPATIENT)
Dept: PRIMARY CARE | Facility: CLINIC | Age: 31
End: 2026-04-20
Payer: MEDICAID